# Patient Record
Sex: FEMALE | Race: OTHER | NOT HISPANIC OR LATINO | Employment: UNEMPLOYED | URBAN - METROPOLITAN AREA
[De-identification: names, ages, dates, MRNs, and addresses within clinical notes are randomized per-mention and may not be internally consistent; named-entity substitution may affect disease eponyms.]

---

## 2021-03-11 ENCOUNTER — NURSE TRIAGE (OUTPATIENT)
Dept: OTHER | Facility: OTHER | Age: 36
End: 2021-03-11

## 2021-03-11 DIAGNOSIS — Z20.828 SARS-ASSOCIATED CORONAVIRUS EXPOSURE: ICD-10-CM

## 2021-03-11 DIAGNOSIS — Z20.828 SARS-ASSOCIATED CORONAVIRUS EXPOSURE: Primary | ICD-10-CM

## 2021-03-11 PROCEDURE — U0003 INFECTIOUS AGENT DETECTION BY NUCLEIC ACID (DNA OR RNA); SEVERE ACUTE RESPIRATORY SYNDROME CORONAVIRUS 2 (SARS-COV-2) (CORONAVIRUS DISEASE [COVID-19]), AMPLIFIED PROBE TECHNIQUE, MAKING USE OF HIGH THROUGHPUT TECHNOLOGIES AS DESCRIBED BY CMS-2020-01-R: HCPCS | Performed by: FAMILY MEDICINE

## 2021-03-11 PROCEDURE — U0005 INFEC AGEN DETEC AMPLI PROBE: HCPCS | Performed by: FAMILY MEDICINE

## 2021-03-11 NOTE — TELEPHONE ENCOUNTER
Reason for Disposition   [1] COVID-19 infection suspected by caller or triager AND [2] mild symptoms (cough, fever, or others) AND [0] no complications or SOB    Answer Assessment - Initial Assessment Questions  Were you within 6 feet or less, for up to 15 minutes or more with a person that has a confirmed COVID-19 test?       yes      What was the date of your exposure? 3/3/21      Are you experiencing any symptoms attributed to the virus?  (Assess for SOB, cough, fever, difficulty breathing)        Yes  Cough, sneezing, headache and sore throat      HIGH RISK: Do you have any history heart or lung conditions, weakened immune system, diabetes, Asthma, CHF, HIV, COPD, Chemo, renal failure, sickle cell, etc?        Asthma, Hypertension      PREGNANCY: Are you pregnant or did you recently give birth?         Denies    Protocols used: CORONAVIRUS (COVID-19) DIAGNOSED OR SUSPECTED-ADULT-OH

## 2021-03-11 NOTE — TELEPHONE ENCOUNTER
Regarding: Covis/symptomatic/ cough  ----- Message from Robert Rosas sent at 3/11/2021  3:45 PM EST -----  "Im having covid symptoms, I have a headache, cough, and sore throat "

## 2021-03-12 ENCOUNTER — TELEPHONE (OUTPATIENT)
Dept: OTHER | Facility: OTHER | Age: 36
End: 2021-03-12

## 2021-03-12 LAB — SARS-COV-2 RNA RESP QL NAA+PROBE: NEGATIVE

## 2021-03-12 NOTE — TELEPHONE ENCOUNTER
Your test for COVID-19, also known as novel coronavirus, came back negative  You do not have COVID-19  If you have any additional questions, we can schedule a virtual visit for you with a provider or [phone] Option 7 for care advice

## 2021-10-26 ENCOUNTER — OFFICE VISIT (OUTPATIENT)
Dept: FAMILY MEDICINE CLINIC | Facility: HOME HEALTHCARE | Age: 36
End: 2021-10-26
Payer: COMMERCIAL

## 2021-10-26 VITALS
WEIGHT: 185.4 LBS | BODY MASS INDEX: 32.85 KG/M2 | OXYGEN SATURATION: 97 % | HEART RATE: 87 BPM | HEIGHT: 63 IN | SYSTOLIC BLOOD PRESSURE: 142 MMHG | DIASTOLIC BLOOD PRESSURE: 78 MMHG | RESPIRATION RATE: 16 BRPM | TEMPERATURE: 97.5 F

## 2021-10-26 DIAGNOSIS — Z13.21 SCREENING FOR ENDOCRINE, NUTRITIONAL, METABOLIC AND IMMUNITY DISORDER: ICD-10-CM

## 2021-10-26 DIAGNOSIS — M25.50 POLYARTHRALGIA: ICD-10-CM

## 2021-10-26 DIAGNOSIS — F17.210 CIGARETTE SMOKER: ICD-10-CM

## 2021-10-26 DIAGNOSIS — Z13.29 SCREENING FOR ENDOCRINE, NUTRITIONAL, METABOLIC AND IMMUNITY DISORDER: ICD-10-CM

## 2021-10-26 DIAGNOSIS — Z13.0 SCREENING FOR ENDOCRINE, NUTRITIONAL, METABOLIC AND IMMUNITY DISORDER: ICD-10-CM

## 2021-10-26 DIAGNOSIS — Z23 ENCOUNTER FOR IMMUNIZATION: ICD-10-CM

## 2021-10-26 DIAGNOSIS — Z00.00 ANNUAL PHYSICAL EXAM: Primary | ICD-10-CM

## 2021-10-26 DIAGNOSIS — Z11.4 SCREENING FOR HIV (HUMAN IMMUNODEFICIENCY VIRUS): ICD-10-CM

## 2021-10-26 DIAGNOSIS — Z11.59 NEED FOR HEPATITIS C SCREENING TEST: ICD-10-CM

## 2021-10-26 DIAGNOSIS — Z12.4 SCREENING FOR CERVICAL CANCER: ICD-10-CM

## 2021-10-26 DIAGNOSIS — Z13.228 SCREENING FOR ENDOCRINE, NUTRITIONAL, METABOLIC AND IMMUNITY DISORDER: ICD-10-CM

## 2021-10-26 DIAGNOSIS — F17.200 TOBACCO DEPENDENCE: ICD-10-CM

## 2021-10-26 DIAGNOSIS — J45.50 SEVERE PERSISTENT ASTHMA, UNSPECIFIED WHETHER COMPLICATED: ICD-10-CM

## 2021-10-26 DIAGNOSIS — J45.50 SEVERE PERSISTENT ASTHMA, UNSPECIFIED WHETHER COMPLICATED: Primary | ICD-10-CM

## 2021-10-26 DIAGNOSIS — J30.2 SEASONAL ALLERGIES: ICD-10-CM

## 2021-10-26 DIAGNOSIS — I10 HYPERTENSION, UNSPECIFIED TYPE: ICD-10-CM

## 2021-10-26 PROCEDURE — 90732 PPSV23 VACC 2 YRS+ SUBQ/IM: CPT

## 2021-10-26 PROCEDURE — T1015 CLINIC SERVICE: HCPCS | Performed by: FAMILY MEDICINE

## 2021-10-26 PROCEDURE — 99385 PREV VISIT NEW AGE 18-39: CPT | Performed by: FAMILY MEDICINE

## 2021-10-26 RX ORDER — BUDESONIDE AND FORMOTEROL FUMARATE DIHYDRATE 160; 4.5 UG/1; UG/1
2 AEROSOL RESPIRATORY (INHALATION) 2 TIMES DAILY
Qty: 10.2 G | Refills: 5 | Status: SHIPPED | OUTPATIENT
Start: 2021-10-26 | End: 2022-06-13

## 2021-10-26 RX ORDER — ALBUTEROL SULFATE 90 UG/1
2 AEROSOL, METERED RESPIRATORY (INHALATION) EVERY 6 HOURS PRN
Qty: 18 G | Refills: 5 | Status: SHIPPED | OUTPATIENT
Start: 2021-10-26 | End: 2022-05-13

## 2021-10-26 RX ORDER — ALBUTEROL SULFATE 90 UG/1
AEROSOL, METERED RESPIRATORY (INHALATION)
COMMUNITY
Start: 2021-10-14 | End: 2021-10-26 | Stop reason: SDUPTHER

## 2021-10-26 RX ORDER — LISINOPRIL AND HYDROCHLOROTHIAZIDE 12.5; 1 MG/1; MG/1
1 TABLET ORAL DAILY
Qty: 30 TABLET | Refills: 5 | Status: SHIPPED | OUTPATIENT
Start: 2021-10-26 | End: 2022-04-18

## 2021-10-26 RX ORDER — FEXOFENADINE HCL 180 MG/1
180 TABLET ORAL DAILY
Qty: 30 TABLET | Refills: 5 | Status: SHIPPED | OUTPATIENT
Start: 2021-10-26

## 2021-10-26 RX ORDER — MONTELUKAST SODIUM 10 MG/1
10 TABLET ORAL
Qty: 30 TABLET | Refills: 5 | Status: SHIPPED | OUTPATIENT
Start: 2021-10-26 | End: 2022-04-18

## 2021-10-27 ENCOUNTER — LAB (OUTPATIENT)
Dept: LAB | Facility: HOSPITAL | Age: 36
End: 2021-10-27
Payer: COMMERCIAL

## 2021-10-27 ENCOUNTER — HOSPITAL ENCOUNTER (OUTPATIENT)
Dept: NON INVASIVE DIAGNOSTICS | Facility: HOSPITAL | Age: 36
Discharge: HOME | End: 2021-10-27
Payer: COMMERCIAL

## 2021-10-27 DIAGNOSIS — Z11.59 NEED FOR HEPATITIS C SCREENING TEST: ICD-10-CM

## 2021-10-27 DIAGNOSIS — Z13.228 SCREENING FOR ENDOCRINE, NUTRITIONAL, METABOLIC AND IMMUNITY DISORDER: ICD-10-CM

## 2021-10-27 DIAGNOSIS — Z13.29 SCREENING FOR ENDOCRINE, NUTRITIONAL, METABOLIC AND IMMUNITY DISORDER: ICD-10-CM

## 2021-10-27 DIAGNOSIS — I10 HYPERTENSION, UNSPECIFIED TYPE: ICD-10-CM

## 2021-10-27 DIAGNOSIS — Z13.0 SCREENING FOR ENDOCRINE, NUTRITIONAL, METABOLIC AND IMMUNITY DISORDER: ICD-10-CM

## 2021-10-27 DIAGNOSIS — Z13.21 SCREENING FOR ENDOCRINE, NUTRITIONAL, METABOLIC AND IMMUNITY DISORDER: ICD-10-CM

## 2021-10-27 DIAGNOSIS — M25.50 POLYARTHRALGIA: ICD-10-CM

## 2021-10-27 DIAGNOSIS — Z11.4 SCREENING FOR HIV (HUMAN IMMUNODEFICIENCY VIRUS): ICD-10-CM

## 2021-10-27 LAB
ALBUMIN SERPL BCP-MCNC: 3.7 G/DL (ref 3.5–5)
ALP SERPL-CCNC: 93 U/L (ref 46–116)
ALT SERPL W P-5'-P-CCNC: 33 U/L (ref 12–78)
ANION GAP SERPL CALCULATED.3IONS-SCNC: 7 MMOL/L (ref 4–13)
AST SERPL W P-5'-P-CCNC: 19 U/L (ref 5–45)
ATRIAL RATE: 96 BPM
BILIRUB SERPL-MCNC: 0.54 MG/DL (ref 0.2–1)
BUN SERPL-MCNC: 11 MG/DL (ref 5–25)
CALCIUM SERPL-MCNC: 9 MG/DL (ref 8.3–10.1)
CHLORIDE SERPL-SCNC: 103 MMOL/L (ref 100–108)
CHOLEST SERPL-MCNC: 159 MG/DL (ref 50–200)
CO2 SERPL-SCNC: 28 MMOL/L (ref 21–32)
CREAT SERPL-MCNC: 0.74 MG/DL (ref 0.6–1.3)
ERYTHROCYTE [DISTWIDTH] IN BLOOD BY AUTOMATED COUNT: 12.1 % (ref 11.6–15.1)
GFR SERPL CREATININE-BSD FRML MDRD: 103 ML/MIN/1.73SQ M
GLUCOSE P FAST SERPL-MCNC: 105 MG/DL (ref 65–99)
HCT VFR BLD AUTO: 43.5 % (ref 34.8–46.1)
HCV AB SER QL: NORMAL
HDLC SERPL-MCNC: 34 MG/DL
HGB BLD-MCNC: 14.2 G/DL (ref 11.5–15.4)
LDLC SERPL CALC-MCNC: 88 MG/DL (ref 0–100)
MCH RBC QN AUTO: 30.3 PG (ref 26.8–34.3)
MCHC RBC AUTO-ENTMCNC: 32.6 G/DL (ref 31.4–37.4)
MCV RBC AUTO: 93 FL (ref 82–98)
NONHDLC SERPL-MCNC: 125 MG/DL
P AXIS: 76 DEGREES
PLATELET # BLD AUTO: 313 THOUSANDS/UL (ref 149–390)
PMV BLD AUTO: 9.7 FL (ref 8.9–12.7)
POTASSIUM SERPL-SCNC: 3.8 MMOL/L (ref 3.5–5.3)
PR INTERVAL: 142 MS
PROT SERPL-MCNC: 7.4 G/DL (ref 6.4–8.2)
QRS AXIS: 78 DEGREES
QRSD INTERVAL: 80 MS
QT INTERVAL: 354 MS
QTC INTERVAL: 447 MS
RBC # BLD AUTO: 4.69 MILLION/UL (ref 3.81–5.12)
SODIUM SERPL-SCNC: 138 MMOL/L (ref 136–145)
T WAVE AXIS: 75 DEGREES
TRIGL SERPL-MCNC: 185 MG/DL
TSH SERPL DL<=0.05 MIU/L-ACNC: 0.95 UIU/ML (ref 0.36–3.74)
VENTRICULAR RATE: 96 BPM
WBC # BLD AUTO: 8.18 THOUSAND/UL (ref 4.31–10.16)

## 2021-10-27 PROCEDURE — 86803 HEPATITIS C AB TEST: CPT

## 2021-10-27 PROCEDURE — 84443 ASSAY THYROID STIM HORMONE: CPT

## 2021-10-27 PROCEDURE — 36415 COLL VENOUS BLD VENIPUNCTURE: CPT

## 2021-10-27 PROCEDURE — 80053 COMPREHEN METABOLIC PANEL: CPT

## 2021-10-27 PROCEDURE — 86038 ANTINUCLEAR ANTIBODIES: CPT

## 2021-10-27 PROCEDURE — 93005 ELECTROCARDIOGRAM TRACING: CPT

## 2021-10-27 PROCEDURE — 87389 HIV-1 AG W/HIV-1&-2 AB AG IA: CPT

## 2021-10-27 PROCEDURE — 85027 COMPLETE CBC AUTOMATED: CPT

## 2021-10-27 PROCEDURE — 93010 ELECTROCARDIOGRAM REPORT: CPT | Performed by: INTERNAL MEDICINE

## 2021-10-27 PROCEDURE — 86430 RHEUMATOID FACTOR TEST QUAL: CPT

## 2021-10-27 PROCEDURE — 80061 LIPID PANEL: CPT

## 2021-10-28 LAB — RHEUMATOID FACT SER QL LA: NEGATIVE

## 2021-10-29 LAB
HIV 1+2 AB+HIV1 P24 AG SERPL QL IA: NORMAL
RYE IGE QN: NEGATIVE

## 2021-11-02 ENCOUNTER — OFFICE VISIT (OUTPATIENT)
Dept: OBGYN CLINIC | Facility: CLINIC | Age: 36
End: 2021-11-02
Payer: COMMERCIAL

## 2021-11-02 VITALS
WEIGHT: 182.6 LBS | BODY MASS INDEX: 32.36 KG/M2 | HEIGHT: 63 IN | SYSTOLIC BLOOD PRESSURE: 124 MMHG | DIASTOLIC BLOOD PRESSURE: 78 MMHG

## 2021-11-02 DIAGNOSIS — N89.8 VAGINAL DISCHARGE: ICD-10-CM

## 2021-11-02 DIAGNOSIS — Z12.4 SCREENING FOR CERVICAL CANCER: ICD-10-CM

## 2021-11-02 DIAGNOSIS — Z12.4 SCREENING FOR MALIGNANT NEOPLASM OF CERVIX: ICD-10-CM

## 2021-11-02 DIAGNOSIS — Z01.419 ROUTINE GYNECOLOGICAL EXAMINATION: Primary | ICD-10-CM

## 2021-11-02 PROCEDURE — G0476 HPV COMBO ASSAY CA SCREEN: HCPCS | Performed by: ADVANCED PRACTICE MIDWIFE

## 2021-11-02 PROCEDURE — G0145 SCR C/V CYTO,THINLAYER,RESCR: HCPCS | Performed by: ADVANCED PRACTICE MIDWIFE

## 2021-11-02 PROCEDURE — 0503F POSTPARTUM CARE VISIT: CPT | Performed by: ADVANCED PRACTICE MIDWIFE

## 2021-11-02 PROCEDURE — 99385 PREV VISIT NEW AGE 18-39: CPT | Performed by: ADVANCED PRACTICE MIDWIFE

## 2021-11-02 PROCEDURE — 87591 N.GONORRHOEAE DNA AMP PROB: CPT | Performed by: ADVANCED PRACTICE MIDWIFE

## 2021-11-02 PROCEDURE — 87491 CHLMYD TRACH DNA AMP PROBE: CPT | Performed by: ADVANCED PRACTICE MIDWIFE

## 2021-11-03 LAB
C TRACH DNA SPEC QL NAA+PROBE: NEGATIVE
N GONORRHOEA DNA SPEC QL NAA+PROBE: NEGATIVE

## 2021-11-05 LAB
HPV HR 12 DNA CVX QL NAA+PROBE: NEGATIVE
HPV16 DNA CVX QL NAA+PROBE: NEGATIVE
HPV18 DNA CVX QL NAA+PROBE: NEGATIVE

## 2021-11-08 ENCOUNTER — TELEPHONE (OUTPATIENT)
Dept: OBGYN CLINIC | Facility: MEDICAL CENTER | Age: 36
End: 2021-11-08

## 2021-11-08 LAB
LAB AP GYN PRIMARY INTERPRETATION: NORMAL
Lab: NORMAL
PATH INTERP SPEC-IMP: NORMAL

## 2021-11-09 ENCOUNTER — OFFICE VISIT (OUTPATIENT)
Dept: FAMILY MEDICINE CLINIC | Facility: HOME HEALTHCARE | Age: 36
End: 2021-11-09
Payer: COMMERCIAL

## 2021-11-09 ENCOUNTER — TELEPHONE (OUTPATIENT)
Dept: OBGYN CLINIC | Facility: MEDICAL CENTER | Age: 36
End: 2021-11-09

## 2021-11-09 VITALS
DIASTOLIC BLOOD PRESSURE: 60 MMHG | SYSTOLIC BLOOD PRESSURE: 104 MMHG | RESPIRATION RATE: 16 BRPM | WEIGHT: 184.4 LBS | TEMPERATURE: 97.5 F | HEIGHT: 63 IN | HEART RATE: 88 BPM | BODY MASS INDEX: 32.67 KG/M2 | OXYGEN SATURATION: 99 %

## 2021-11-09 DIAGNOSIS — I10 PRIMARY HYPERTENSION: ICD-10-CM

## 2021-11-09 DIAGNOSIS — R73.01 ELEVATED FASTING GLUCOSE: ICD-10-CM

## 2021-11-09 DIAGNOSIS — M25.50 POLYARTHRALGIA: Primary | ICD-10-CM

## 2021-11-09 DIAGNOSIS — J45.40 MODERATE PERSISTENT ASTHMA WITHOUT COMPLICATION: ICD-10-CM

## 2021-11-09 DIAGNOSIS — F17.210 CIGARETTE SMOKER: ICD-10-CM

## 2021-11-09 PROCEDURE — T1015 CLINIC SERVICE: HCPCS | Performed by: FAMILY MEDICINE

## 2021-11-09 PROCEDURE — 99213 OFFICE O/P EST LOW 20 MIN: CPT | Performed by: FAMILY MEDICINE

## 2021-11-10 ENCOUNTER — APPOINTMENT (OUTPATIENT)
Dept: LAB | Facility: HOSPITAL | Age: 36
End: 2021-11-10
Payer: COMMERCIAL

## 2021-11-10 DIAGNOSIS — R73.01 ELEVATED FASTING GLUCOSE: ICD-10-CM

## 2021-11-10 LAB
EST. AVERAGE GLUCOSE BLD GHB EST-MCNC: 117 MG/DL
HBA1C MFR BLD: 5.7 %

## 2021-11-10 PROCEDURE — 83036 HEMOGLOBIN GLYCOSYLATED A1C: CPT

## 2021-11-10 PROCEDURE — 36415 COLL VENOUS BLD VENIPUNCTURE: CPT

## 2021-11-18 ENCOUNTER — OFFICE VISIT (OUTPATIENT)
Dept: RHEUMATOLOGY | Facility: CLINIC | Age: 36
End: 2021-11-18
Payer: COMMERCIAL

## 2021-11-18 VITALS
WEIGHT: 184 LBS | HEIGHT: 63 IN | DIASTOLIC BLOOD PRESSURE: 76 MMHG | BODY MASS INDEX: 32.6 KG/M2 | SYSTOLIC BLOOD PRESSURE: 122 MMHG

## 2021-11-18 DIAGNOSIS — M25.50 POLYARTHRALGIA: Primary | ICD-10-CM

## 2021-11-18 DIAGNOSIS — G89.29 CHRONIC BILATERAL LOW BACK PAIN, UNSPECIFIED WHETHER SCIATICA PRESENT: ICD-10-CM

## 2021-11-18 DIAGNOSIS — M54.50 CHRONIC BILATERAL LOW BACK PAIN, UNSPECIFIED WHETHER SCIATICA PRESENT: ICD-10-CM

## 2021-11-18 PROCEDURE — 99244 OFF/OP CNSLTJ NEW/EST MOD 40: CPT | Performed by: INTERNAL MEDICINE

## 2021-11-19 ENCOUNTER — HOSPITAL ENCOUNTER (OUTPATIENT)
Dept: RADIOLOGY | Facility: HOSPITAL | Age: 36
Discharge: HOME | End: 2021-11-19
Payer: COMMERCIAL

## 2021-11-19 ENCOUNTER — APPOINTMENT (OUTPATIENT)
Dept: LAB | Facility: HOSPITAL | Age: 36
End: 2021-11-19
Payer: COMMERCIAL

## 2021-11-19 DIAGNOSIS — M54.50 CHRONIC BILATERAL LOW BACK PAIN, UNSPECIFIED WHETHER SCIATICA PRESENT: ICD-10-CM

## 2021-11-19 DIAGNOSIS — G89.29 CHRONIC BILATERAL LOW BACK PAIN, UNSPECIFIED WHETHER SCIATICA PRESENT: ICD-10-CM

## 2021-11-19 LAB
25(OH)D3 SERPL-MCNC: 32.2 NG/ML (ref 30–100)
CRP SERPL QL: 4.6 MG/L
ERYTHROCYTE [SEDIMENTATION RATE] IN BLOOD: 15 MM/HOUR (ref 0–19)

## 2021-11-19 PROCEDURE — 72110 X-RAY EXAM L-2 SPINE 4/>VWS: CPT

## 2021-11-19 PROCEDURE — 82306 VITAMIN D 25 HYDROXY: CPT | Performed by: INTERNAL MEDICINE

## 2021-11-19 PROCEDURE — 86140 C-REACTIVE PROTEIN: CPT | Performed by: INTERNAL MEDICINE

## 2021-11-19 PROCEDURE — 36415 COLL VENOUS BLD VENIPUNCTURE: CPT | Performed by: INTERNAL MEDICINE

## 2021-11-19 PROCEDURE — 86235 NUCLEAR ANTIGEN ANTIBODY: CPT | Performed by: INTERNAL MEDICINE

## 2021-11-19 PROCEDURE — 85652 RBC SED RATE AUTOMATED: CPT | Performed by: INTERNAL MEDICINE

## 2021-11-19 PROCEDURE — 72202 X-RAY EXAM SI JOINTS 3/> VWS: CPT

## 2021-11-19 PROCEDURE — 86200 CCP ANTIBODY: CPT | Performed by: INTERNAL MEDICINE

## 2021-11-20 LAB
ENA SS-A AB SER-ACNC: <0.2 AI (ref 0–0.9)
ENA SS-B AB SER-ACNC: <0.2 AI (ref 0–0.9)

## 2021-11-23 LAB — CCP AB SER IA-ACNC: 2

## 2021-11-29 DIAGNOSIS — M54.50 CHRONIC BILATERAL LOW BACK PAIN, UNSPECIFIED WHETHER SCIATICA PRESENT: Primary | ICD-10-CM

## 2021-11-29 DIAGNOSIS — G89.29 CHRONIC BILATERAL LOW BACK PAIN, UNSPECIFIED WHETHER SCIATICA PRESENT: Primary | ICD-10-CM

## 2021-11-29 DIAGNOSIS — M51.36 LUMBAR DEGENERATIVE DISC DISEASE: ICD-10-CM

## 2021-12-14 ENCOUNTER — HOSPITAL ENCOUNTER (OUTPATIENT)
Dept: PULMONOLOGY | Facility: HOSPITAL | Age: 36
Discharge: HOME | End: 2021-12-14
Payer: COMMERCIAL

## 2021-12-14 DIAGNOSIS — J45.40 MODERATE PERSISTENT ASTHMA WITHOUT COMPLICATION: ICD-10-CM

## 2021-12-14 DIAGNOSIS — F17.210 CIGARETTE SMOKER: ICD-10-CM

## 2021-12-14 PROCEDURE — 94729 DIFFUSING CAPACITY: CPT | Performed by: INTERNAL MEDICINE

## 2021-12-14 PROCEDURE — 94060 EVALUATION OF WHEEZING: CPT | Performed by: INTERNAL MEDICINE

## 2021-12-14 PROCEDURE — 94726 PLETHYSMOGRAPHY LUNG VOLUMES: CPT | Performed by: INTERNAL MEDICINE

## 2021-12-14 RX ORDER — ALBUTEROL SULFATE 2.5 MG/3ML
2.5 SOLUTION RESPIRATORY (INHALATION) ONCE
Status: COMPLETED | OUTPATIENT
Start: 2021-12-14 | End: 2021-12-14

## 2021-12-14 RX ADMIN — ALBUTEROL SULFATE 2.5 MG: 2.5 SOLUTION RESPIRATORY (INHALATION) at 13:15

## 2021-12-29 DIAGNOSIS — R51.9 NONINTRACTABLE HEADACHE, UNSPECIFIED CHRONICITY PATTERN, UNSPECIFIED HEADACHE TYPE: ICD-10-CM

## 2021-12-29 DIAGNOSIS — R05.9 COUGH: Primary | ICD-10-CM

## 2021-12-29 DIAGNOSIS — R06.7 SNEEZING: ICD-10-CM

## 2021-12-29 PROCEDURE — 0241U HB NFCT DS VIR RESP RNA 4 TRGT: CPT | Performed by: PHYSICIAN ASSISTANT

## 2022-01-03 ENCOUNTER — OFFICE VISIT (OUTPATIENT)
Dept: PAIN MEDICINE | Facility: CLINIC | Age: 37
End: 2022-01-03
Payer: COMMERCIAL

## 2022-01-03 ENCOUNTER — EVALUATION (OUTPATIENT)
Dept: PHYSICAL THERAPY | Facility: HOME HEALTHCARE | Age: 37
End: 2022-01-03
Payer: COMMERCIAL

## 2022-01-03 ENCOUNTER — APPOINTMENT (OUTPATIENT)
Dept: RADIOLOGY | Facility: CLINIC | Age: 37
End: 2022-01-03
Payer: COMMERCIAL

## 2022-01-03 VITALS
HEIGHT: 63 IN | DIASTOLIC BLOOD PRESSURE: 82 MMHG | HEART RATE: 96 BPM | WEIGHT: 187 LBS | SYSTOLIC BLOOD PRESSURE: 136 MMHG | BODY MASS INDEX: 33.13 KG/M2

## 2022-01-03 DIAGNOSIS — G89.29 CHRONIC BILATERAL LOW BACK PAIN, UNSPECIFIED WHETHER SCIATICA PRESENT: ICD-10-CM

## 2022-01-03 DIAGNOSIS — G89.29 CHRONIC CERVICAL PAIN: ICD-10-CM

## 2022-01-03 DIAGNOSIS — M54.2 NECK PAIN: ICD-10-CM

## 2022-01-03 DIAGNOSIS — M54.2 NECK PAIN: Primary | ICD-10-CM

## 2022-01-03 DIAGNOSIS — M54.12 CERVICAL RADICULOPATHY: ICD-10-CM

## 2022-01-03 DIAGNOSIS — M54.50 CHRONIC BILATERAL LOW BACK PAIN, UNSPECIFIED WHETHER SCIATICA PRESENT: ICD-10-CM

## 2022-01-03 DIAGNOSIS — M54.2 CHRONIC CERVICAL PAIN: ICD-10-CM

## 2022-01-03 DIAGNOSIS — M51.36 LUMBAR DEGENERATIVE DISC DISEASE: ICD-10-CM

## 2022-01-03 DIAGNOSIS — M54.42 CHRONIC BILATERAL LOW BACK PAIN WITH BILATERAL SCIATICA: Primary | ICD-10-CM

## 2022-01-03 DIAGNOSIS — G89.29 CHRONIC BILATERAL LOW BACK PAIN WITH BILATERAL SCIATICA: Primary | ICD-10-CM

## 2022-01-03 DIAGNOSIS — M54.41 CHRONIC BILATERAL LOW BACK PAIN WITH BILATERAL SCIATICA: Primary | ICD-10-CM

## 2022-01-03 PROCEDURE — 72050 X-RAY EXAM NECK SPINE 4/5VWS: CPT

## 2022-01-03 PROCEDURE — 99244 OFF/OP CNSLTJ NEW/EST MOD 40: CPT | Performed by: ANESTHESIOLOGY

## 2022-01-03 PROCEDURE — 97163 PT EVAL HIGH COMPLEX 45 MIN: CPT | Performed by: PHYSICAL THERAPIST

## 2022-01-03 PROCEDURE — 97112 NEUROMUSCULAR REEDUCATION: CPT | Performed by: PHYSICAL THERAPIST

## 2022-01-03 PROCEDURE — 97110 THERAPEUTIC EXERCISES: CPT | Performed by: PHYSICAL THERAPIST

## 2022-01-03 RX ORDER — DULOXETIN HYDROCHLORIDE 20 MG/1
20 CAPSULE, DELAYED RELEASE ORAL DAILY
Qty: 30 CAPSULE | Refills: 1 | Status: SHIPPED | OUTPATIENT
Start: 2022-01-03 | End: 2022-03-01

## 2022-01-03 NOTE — PATIENT INSTRUCTIONS
Duloxetine (By mouth)   Duloxetine (doo-LOX-e-teen)  Treats depression, anxiety, diabetic peripheral neuropathy, fibromyalgia, and chronic muscle or bone pain  This medicine is an SSNRI  Brand Name(s): Cymbalta, izalma Sprinkle, Ireabigailka   There may be other brand names for this medicine  When This Medicine Should Not Be Used: This medicine is not right for everyone  Do not use it if you had an allergic reaction to duloxetine  How to Use This Medicine:   Capsule, Delayed Release Capsule  · Take your medicine as directed  Your dose may need to be changed several times to find what works best for you  · Delayed-release capsule: Swallow the capsule whole  Do not crush, chew, break, or open it  Do not open the Cymbalta® delayed-release capsule and sprinkle the contents on food or in liquids  · If you have trouble swallowing the Carlos Gandhi delayed release capsule:   ? You may open the capsule and sprinkle the contents over one tablespoon (15 mL) of applesauce  Swallow the mixture right away and do not save any of the mixture to use later  ? You may open the capsule and pour the contents to an all plastic catheter tip syringe and add 50 mL of water  Do not use other liquids  Gently shake it for 10 seconds, and then use it through a nasogastric tube  Rinse with additional water (about 15 mL) if needed  · This medicine should come with a Medication Guide  Ask your pharmacist for a copy if you do not have one  · Missed dose: Take a dose as soon as you remember  If it is almost time for your next dose, wait until then and take a regular dose  Do not take extra medicine to make up for a missed dose  · Store the medicine in a closed container at room temperature, away from heat, moisture, and direct light  Drugs and Foods to Avoid:   Ask your doctor or pharmacist before using any other medicine, including over-the-counter medicines, vitamins, and herbal products    · Do not take duloxetine if you have used an MAO inhibitor (MAOI) within the past 14 days  Do not start taking an MAO inhibitor within 5 days of stopping duloxetine  Ask your doctor if you are not sure if you take an MAOI, including linezolid or methylene blue injection  · Some medicines can affect how duloxetine works  Tell your doctor if you are using any of the following:  ? Buspirone, cimetidine, ciprofloxacin, enoxacin, fentanyl, fluvoxamine, lithium, Joshua's wort, theophylline, tramadol, tryptophan, warfarin  ? Amphetamines  ? Blood pressure medicine  ? Diuretic (water pill)  ? Medicine for heart rhythm problems (including flecainide, propafenone, quinidine)  ? Medicine to treat migraine headaches (including triptans)  ? NSAID pain or arthritis medicine (including aspirin, celecoxib, diclofenac, ibuprofen, naproxen)  ? Other medicine to treat depression or mood disorders (including amitriptyline, desipramine, fluoxetine, imipramine, nortriptyline, paroxetine)  ? Phenothiazine medicine (including thioridazine)  ? Stomach medicine (including famotidine, antacids containing aluminum or magnesium, PPIs)  · Tell your doctor if you use anything else that makes you sleepy  Some examples are allergy medicine, narcotic pain medicine, and alcohol  · Do not drink alcohol while you are using this medicine  Warnings While Using This Medicine:   · Tell your doctor if you are pregnant or breastfeeding, or if you have kidney disease, liver disease, bleeding problems, diabetes, digestion problems, glaucoma, heart disease, high or low blood pressure, or problems with urination  Tell your doctor if you smoke or you have a history of seizures, mental health problems (including bipolar disorder, dara), or drug or alcohol addiction  · This medicine may cause the following problems:   ? Serious liver problems  ? Serotonin syndrome, when used with certain medicines  ? Increased risk of bleeding problems  ?  Serious skin reactions, including erythema multiforme, Caceres-Kalin syndrome  ? Low sodium levels in the blood  ? Sexual problems  · This medicine can increase thoughts of suicide  Tell your doctor right away if you start to feel depressed and have thoughts about hurting yourself  · This medicine may cause blurred vision, dizziness, drowsiness, trouble with thinking, or trouble with controlling body movements, which may lead to falls, fractures, or other injuries  Do not drive or do anything that could be dangerous until you know how this medicine affects you  Stand up slowly to avoid falls  · Do not stop using this medicine suddenly  Your doctor will need to slowly decrease your dose before you stop it completely  · Your doctor will check your progress and the effects of this medicine at regular visits  Keep all appointments  · Keep all medicine out of the reach of children  Never share your medicine with anyone    Possible Side Effects While Using This Medicine:   Call your doctor right away if you notice any of these side effects:  · Allergic reaction: Itching or hives, swelling in your face or hands, swelling or tingling in your mouth or throat, chest tightness, trouble breathing  · Anxiety, restlessness, fever, fast heartbeat, sweating, muscle spasms, diarrhea, seeing or hearing things that are not there  · Blistering, peeling, red skin rash  · Confusion, weakness, muscle twitching  · Dark urine or pale stools, nausea, vomiting, loss of appetite, stomach pain, yellow skin or eyes  · Decrease in how much or how often you urinate  · Decrease in sexual ability, desire, drive, or performance  · Eye pain, vision changes, seeing halos around lights  · Feeling more energetic than usual  · Lightheadedness, dizziness, fainting  · Unusual moods or behaviors, worsening depression, thoughts about hurting yourself, trouble sleeping  · Unusual bleeding or bruising  If you notice these less serious side effects, talk with your doctor:   · Decrease in appetite or weight  · Dry mouth, constipation, mild nausea  · Headache  · Unusual drowsiness, sleepiness, or tiredness  If you notice other side effects that you think are caused by this medicine, tell your doctor  Call your doctor for medical advice about side effects  You may report side effects to FDA at 1-576-FDA-3102    © Copyright [employer] 2021 Information is for End User's use only and may not be sold, redistributed or otherwise used for commercial purposes  The above information is an  only  It is not intended as medical advice for individual conditions or treatments  Talk to your doctor, nurse or pharmacist before following any medical regimen to see if it is safe and effective for you

## 2022-01-03 NOTE — LETTER
January 3, 2022     DO Ceislia Bailey [address]  [address]    Patient: Gela Mendoza   YOB: 1985   Date of Visit: 1/3/2022       Dear Dr Ricardo Skinner: Thank you for referring Nichole Crook to me for evaluation  Below are my notes for this consultation  If you have questions, please do not hesitate to call me  I look forward to following your patient along with you  Sincerely,        Maame Polanco MD        CC: No Recipients  Maame Polanco MD  1/3/2022 10:45 AM  Signed  Assessment:  1  Neck pain    2  Chronic bilateral low back pain, unspecified whether sciatica present    3  Lumbar degenerative disc disease    4  Cervical radiculopathy        Plan:  Patient is a 25-year-old female with complaints of neck pain, bilateral arm pain, low back pain, bilateral leg pain presents office for initial consultation  Patient is currently being managed by Rheumatology for polyarthritis  X-ray lumbar spine does show degenerative disc changes at L5-S1   1  We will order x-ray of the cervical spine  2  We will order physical therapy for cervical and lumbar spine strengthening exercises  3  We will trial Cymbalta for neuropathic pain  4  Follow-up in 6 weeks after patient's complete physical therapy after which we will then order an MRI of the cervical lumbar spine    History of Present Illness: The patient is a 43 y o  female who presents for consultation in regards to Shoulder Pain, Back Pain, Arm Pain, Knee Pain, and Ankle Pain  Symptoms have been present for 8 years  Symptoms began without any precipitating injury or trauma  Pain is reported to be 10 on the numeric rating scale  Symptoms are felt constantly and worst in the morning, evening  Symptoms are characterized as shooting, sharp, cutting, numbing, pressure-like and throbbing  Symptoms are associated with bilateral arm weakness and bilateral leg weakness    Aggravating factors include lying down, standing, bending, leaning forward, leaning bckward, sitting, walking, exercise, turning the head, relaxation and coughing/sneezing  Relieving factors include nothing  No change in symptoms with bowel movements  Patient has not tried any pain relieving modalities at this time  Medications to relieve symptoms include ibuprofen, Tylenol  Review of Systems:    Review of Systems   All other systems reviewed and are negative  Past Medical History:   Diagnosis Date    Asthma     Hypertension        Past Surgical History:   Procedure Laterality Date     SECTION      TUBAL LIGATION         No family history on file      Social History     Occupational History    Not on file   Tobacco Use    Smoking status: Current Every Day Smoker     Packs/day:  00     Types: Cigarettes    Smokeless tobacco: Never Used   Vaping Use    Vaping Use: Never used   Substance and Sexual Activity    Alcohol use: Not Currently    Drug use: Never    Sexual activity: Yes     Partners: Male     Birth control/protection: Female Sterilization         Current Outpatient Medications:     albuterol (PROVENTIL HFA,VENTOLIN HFA) 90 mcg/act inhaler, Inhale 2 puffs every 6 (six) hours as needed for wheezing, Disp: 18 g, Rfl: 5    budesonide-formoterol (Symbicort) 160-4 5 mcg/act inhaler, Inhale 2 puffs 2 (two) times a day Rinse mouth after use , Disp: 10 2 g, Rfl: 5    fexofenadine (ALLEGRA) 180 MG tablet, Take 1 tablet (180 mg total) by mouth daily, Disp: 30 tablet, Rfl: 5    lisinopril-hydrochlorothiazide (PRINZIDE,ZESTORETIC) 10-12 5 MG per tablet, Take 1 tablet by mouth daily, Disp: 30 tablet, Rfl: 5    montelukast (SINGULAIR) 10 mg tablet, Take 1 tablet (10 mg total) by mouth daily at bedtime, Disp: 30 tablet, Rfl: 5    No Known Allergies    Physical Exam:    /82   Pulse 96   Ht 5' 3" (1 6 m)   Wt 84 8 kg (187 lb)   BMI 33 13 kg/m²     Constitutional: normal, well developed, well nourished, alert, in no distress and non-toxic and no overt pain behavior  and obese  Eyes: anicteric  HEENT: grossly intact  Neck: supple, symmetric, trachea midline and no masses   Pulmonary:even and unlabored  Cardiovascular:No edema or pitting edema present  Skin:Normal without rashes or lesions and well hydrated  Psychiatric:Mood and affect appropriate  Neurologic:Cranial Nerves II-XII grossly intact  Musculoskeletal:antalgic     Cervical Spine examination demonstrates  Decreased ROM secondary to pain with lateral rotation to the left/right and bending to the left/right, in addition to neck flexion  5/5 upper extremity strength in all muscle groups bilaterally  Negative Spurling's maneuver to the b/l Ue, sensitivity to light touch intact b/l Ue  Lumbar/Sacral Spine examination demonstrates  Decreased range of motion lumbar spine with pain upon: flexion, lateral rotation to the left/right, and bending to the left/right  Bilateral lumbar paraspinals tender to palpation  Muscle spasms noted in the lumbar area bilaterally  4/5 lower extremity strength in all muscle groups bilaterally  Positive seated straight leg raise for bilateral lower extremities  Sensitivity to light touch intact bilateral lower extremities  2+ reflexes in the patella and Achilles  No ankle clonus     Imaging  LUMBAR SPINE     INDICATION:   M54 50: Low back pain, unspecified  G89 29:  Other chronic pain      COMPARISON:  10/18/2013     VIEWS:  XR SPINE LUMBAR MINIMUM 4 VIEWS NON INJURY        FINDINGS:     There are 5 non rib bearing lumbar vertebral bodies       There is no evidence of acute fracture or destructive osseous lesion      Alignment is unremarkable       There is interval development of significant disc space narrowing at L5-S1 with endplate spurring     The pedicles appear intact      Soft tissues are unremarkable      IMPRESSION:     Degenerative disc disease at L5-S1     No compression fracture or subluxation    No orders of the defined types were placed in this encounter

## 2022-01-03 NOTE — PROGRESS NOTES
PT Evaluation     Today's date: 1/3/2022  Patient name: Flaquita Muller  : 1985  MRN: [id number]  Referring provider: Silvia Grant MD  Dx:   Encounter Diagnosis     ICD-10-CM    1  Chronic bilateral low back pain with bilateral sciatica  M54 42     M54 41     G89 29    2  Chronic cervical pain  M54 2     G89 29                   Assessment  Assessment details: Pt Abril Kitchen is a 43 y o  who presents to OPPT with s/s consistent with chronic lumbar and cervical spine pain  Pt presents with limited L/S and C/S mobility and core strength deficits, decreased B UE/LE ROM and strength, impaired soft tissue mobility/limited flexibility, decreased postural awareness, and gait/balance dysfunctions  Pt with limitations with prolonged ambulation, difficulty with stair negotiation, disrupted sleep patterns, and difficulty with lifting/carrying  She also notes intermittent N/T into B UE/B LE; symptoms worse into B LE with prolonged sitting and worse into B UE with any UE sustained positioning shoulder heigh to OH  Pt has become more sedentary due to limitations and has difficulty completing daily activities at home due to increased pain  Pt would benefit from skilled therapy services to address outlined impairments, work towards goals, and restore pts PLOF  Thank you!    Impairments: abnormal gait, abnormal or restricted ROM, abnormal movement, activity intolerance, impaired balance, impaired physical strength, lacks appropriate home exercise program, pain with function, weight-bearing intolerance, poor posture  and poor body mechanics    Goals  STGs to be achieved in 4 weeks:  -Pt to demonstrate reduced subjective pain rating "at worst" by at least 2-3 points from Initial Eval to allow for reduced pain with ADLs and improved functional activity tolerance    -Pt to demonstrate ROM improved to WNL in order to maximize joint mobility and function and allow for progression of exercise program and achievement of goals    -Pt to demonstrate increased MMT of BUE/BLE by at least 1/2 grade in order to improve safety and stability with ADLs and functional mobility  LTGs to be achieved upon discharge:   -Pt will be I with HEP in order to continue to improve quality of life and independence and reduce risk for re-injury    -Pt to demonstrate return to activities of daily living without limiations or restrictions    -Pt will return to sitting tolerance > 1 hour without radicular symptoms to help facilitate return to work activities independently   -Pt to demonstrate return to lifting/carrying > 5# without radicular symptoms to return to daily household chores   -Pt to demonstrate improved function as noted by achieving or exceeding predicted score on FOTO outcomes assessment tool  Plan  Plan details: PT provided pt with detailed HEP program; pt verbalized understanding of program    Patient would benefit from: skilled physical therapy  Planned modality interventions: cryotherapy and thermotherapy: hydrocollator packs  Planned therapy interventions: abdominal trunk stabilization, balance, manual therapy, neuromuscular re-education, patient education, postural training, therapeutic activities, therapeutic exercise, home exercise program, flexibility and functional ROM exercises  Frequency: 2x week  Duration in weeks: 4  Plan of Care beginning date: 1/3/2022  Plan of Care expiration date: 2/4/2022  Treatment plan discussed with: patient        Subjective Evaluation    History of Present Illness  Mechanism of injury: Pt reporting that she has been having back and neck pain for 10+ years  She notes that she previously lived in [place] and "they wouldn't do anything to treat it with her insurance"  She recently moved back to [place] and is now starting to see new doctors re: her pain  She just recently began with pain management who is referring to OPPT for conservative management of s/s   MD wants MRI but insurance has denied until she completes therapy  She will return to MD again in 6 weeks     Quality of life: good    Pain  At best pain ratin  At worst pain ratin  Quality: tight  Relieving factors: change in position  Aggravating factors: sitting, lifting, overhead activity and stair climbing    Social Support  Stairs in house: yes     Employment status: working (sitting majority of the day )    Diagnostic Tests  No diagnostic tests performed  Treatments  Previous treatment: physical therapy and medication  Current treatment: physical therapy  Patient Goals  Patient goals for therapy: decreased pain, increased motion, increased strength, independence with ADLs/IADLs and return to sport/leisure activities          Objective     Postural Observations  Seated posture: fair  Standing posture: fair        Neurological Testing     Sensation   Cervical/Thoracic   Left   Intact: light touch  Paresthesia: light touch    Right   Intact: light touch  Paresthesia: light touch    Lumbar   Left   Intact: light touch  Paresthesia: light touch    Right   Intact: light touch  Paresthesia: light touch    Active Range of Motion   Cervical/Thoracic Spine       Cervical    Flexion: 45 degrees  with pain  Extension: 35 degrees     with pain  Left lateral flexion: 35 degrees     with pain  Right lateral flexion: 35 degrees     with pain  Left rotation: 45 degrees with pain  Right rotation: 45 degrees    with pain    Lumbar   Flexion:  WFL and with pain  Extension:  with pain Restriction level: moderate  Left lateral flexion:  Restriction level: minimal  Right lateral flexion:  Restriction level: minimal  Mechanical Assessment    Cervical      Thoracic    Seated flexion: sustained positions  Pain location: peripheralized  Pain level: increased    Lumbar    Standing extension: repeated movements  Pain location: centralized  Pain level: decreased    Strength/Myotome Testing     Left Shoulder     Planes of Motion   Flexion: 4   Abduction: 4-     Right Shoulder Planes of Motion   Flexion: 4   Abduction: 4-     Left Hip   Planes of Motion   Flexion: 4-  Abduction: 4-  Adduction: 4-    Right Hip   Planes of Motion   Flexion: 3+  Abduction: 3+  Adduction: 3+    Tests   Cervical   Positive vertical compression and neck flexor muscle endurance test     Lumbar   Positive vertical compression   Left   Positive crossed SLR and passive SLR  Right   Positive crossed SLR and passive SLR                Re-eval Date: 2/4/22     Precautions: chronic pain; high sx irritability        Manuals 1/3                                       Neuro Re-Ed         PPT with jennifer         TA + OH lifts        MTP/LTP        Palloff press         Wall angels        Ball on wall                 Postural ed HZ        Ther Ex        NuStep         UBE        Scapular retractions  Post shld rolls HEP        C/S isometrics         C/S retractions         Shoulder flex/abd to 90*         Standing hip flex/abd/ext        Squats         Step-ups         LTR        SKTC        SLR        S/L SLR        Clamshells         Heel walk outs         Bridges         Doorway stretch        UT stretch         SA punches         Prone lying  Prone prop  PPU  HEP                        Ther Activity                        Gait Training                        Modalities

## 2022-01-07 ENCOUNTER — OFFICE VISIT (OUTPATIENT)
Dept: PHYSICAL THERAPY | Facility: HOME HEALTHCARE | Age: 37
End: 2022-01-07
Payer: COMMERCIAL

## 2022-01-07 DIAGNOSIS — M54.42 CHRONIC BILATERAL LOW BACK PAIN WITH BILATERAL SCIATICA: Primary | ICD-10-CM

## 2022-01-07 DIAGNOSIS — G89.29 CHRONIC BILATERAL LOW BACK PAIN WITH BILATERAL SCIATICA: Primary | ICD-10-CM

## 2022-01-07 DIAGNOSIS — M54.41 CHRONIC BILATERAL LOW BACK PAIN WITH BILATERAL SCIATICA: Primary | ICD-10-CM

## 2022-01-07 PROCEDURE — 97112 NEUROMUSCULAR REEDUCATION: CPT

## 2022-01-07 PROCEDURE — 97110 THERAPEUTIC EXERCISES: CPT

## 2022-01-07 NOTE — PROGRESS NOTES
Daily Note     Today's date: 2022  Patient name: León Moody  : 1985  MRN: [id number]  Referring provider: Stephanie Olvera MD  Dx: No diagnosis found  Start Time: 0900          Subjective: Pain of 5/10 at LB  My neck isn't to bad today  My LB is really stiff from not moving t/o the night  I have sciatica on my R side and pain in my L knee  Objective: See treatment diary below    Assessment: Pt tito initiation of TE as per flow sheet well but does require vc's for correct form and posture  Pt with report of LBP of 5/10 t/o session with R > L  Cerv pain and tightness of 3/10  Pt denied increased pain with added TE  Patient would benefit from continued PT    Plan: Continue per plan of care        Re-eval Date: 22     Precautions: chronic pain; high sx irritability        Manuals 1/3 1-7-22                                      Neuro Re-Ed   22      PPT  5" x 10      PPT with jennifer         TA + OH lifts        MTP/LTP  NV      Palloff press   NV      Wall angels  1x10      Ball on wall                 Postural ed HZ        Ther Ex  22      NuStep   L 1  10'      UBE        Scapular retractions  Post shld rolls HEP  1 x 10 ea       C/S isometrics         C/S retractions   5" x10      Shoulder flex/abd to 90*   1x10 ea Talha      Standing hip flex/abd/ext  1 x 10 Talha   Flex/abd      Squats   1x10      Step-ups         LTR  1x10      SKTC  5" x5      SLR        S/L SLR        Clamshells         Heel walk outs         Bridges   1x10      Doorway stretch  15" x3      UT stretch         SA punches   1x10      Prone lying  Prone prop  PPU  HEP                        Ther Activity                        Gait Training                        Modalities

## 2022-01-11 ENCOUNTER — OFFICE VISIT (OUTPATIENT)
Dept: PHYSICAL THERAPY | Facility: HOME HEALTHCARE | Age: 37
End: 2022-01-11
Payer: COMMERCIAL

## 2022-01-11 DIAGNOSIS — G89.29 CHRONIC BILATERAL LOW BACK PAIN WITH BILATERAL SCIATICA: Primary | ICD-10-CM

## 2022-01-11 DIAGNOSIS — M54.42 CHRONIC BILATERAL LOW BACK PAIN WITH BILATERAL SCIATICA: Primary | ICD-10-CM

## 2022-01-11 DIAGNOSIS — M54.41 CHRONIC BILATERAL LOW BACK PAIN WITH BILATERAL SCIATICA: Primary | ICD-10-CM

## 2022-01-11 DIAGNOSIS — M54.2 CHRONIC CERVICAL PAIN: ICD-10-CM

## 2022-01-11 DIAGNOSIS — G89.29 CHRONIC CERVICAL PAIN: ICD-10-CM

## 2022-01-11 PROCEDURE — 97112 NEUROMUSCULAR REEDUCATION: CPT

## 2022-01-11 PROCEDURE — 97110 THERAPEUTIC EXERCISES: CPT

## 2022-01-11 NOTE — PROGRESS NOTES
Daily Note     Today's date: 2022  Patient name: Vaishali Douglas  : 1985  MRN: [id number]  Referring provider: Nuvia Mcdermott MD  Dx:   Encounter Diagnosis     ICD-10-CM    1  Chronic bilateral low back pain with bilateral sciatica  M54 42     M54 41     G89 29    2  Chronic cervical pain  M54 2     G89 29                   Subjective: Patient reports minimal change in LBP and rates LBP as 7/10 at beginning of session  She says the pain medication they prescribed has not been helping and has even upset her stomach  She reports her arms were a little sore following last session  Objective: See treatment diary below      Assessment: Tolerated treatment well with no increase in LBP throughout  Patient required verbal cues to avoid holding breath when performing bridges and to avoid trunk lean when performing standing hip series  Patient demonstrated moderate fatigue post session  Patient would benefit from continued PT to increase trunk mobility and core strength for improved function in ADLs  Plan: Continue per plan of care        Re-eval Date: 22     Precautions: chronic pain; high sx irritability        Manuals 1/3 1-7-22 1/11/22                                     Neuro Re-Ed   22      PPT  5" x 10 5"x15     PPT with marches         TA + OH lifts        MTP/LTP  NV RTB 2x10     Palloff press   NV RTB 10x ea     Wall angels  1x10 1x10     Ball on wall                 Postural ed HZ        Ther Ex  22      NuStep   L 1  10' L1 10'     UBE        Scapular retractions  Post shld rolls HEP  1 x 10 ea  2x10 ea     C/S isometrics         C/S retractions   5" x10 5"x15     Shoulder flex/abd to 90*   1x10 ea Talha 1x15 ea bilat     Standing hip flex/abd/ext  1 x 10 Talha   Flex/abd 1 x 10 Talha   Flex/abd/ext     Squats   1x10 2x10     Step-ups         LTR  1x10 10"x10     SKTC  5" x5 5"x5 ea     SLR        S/L SLR        Clamshells         Heel walk outs         Bridges   1x10 2x10 Doorway stretch  15" x3 15"x3     UT stretch         SA punches   1x10 2x10     Prone lying  Prone prop  PPU  HEP                        Ther Activity                        Gait Training                        Modalities

## 2022-01-13 ENCOUNTER — OFFICE VISIT (OUTPATIENT)
Dept: PHYSICAL THERAPY | Facility: HOME HEALTHCARE | Age: 37
End: 2022-01-13
Payer: COMMERCIAL

## 2022-01-13 DIAGNOSIS — G89.29 CHRONIC BILATERAL LOW BACK PAIN WITH BILATERAL SCIATICA: Primary | ICD-10-CM

## 2022-01-13 DIAGNOSIS — M54.41 CHRONIC BILATERAL LOW BACK PAIN WITH BILATERAL SCIATICA: Primary | ICD-10-CM

## 2022-01-13 DIAGNOSIS — M54.42 CHRONIC BILATERAL LOW BACK PAIN WITH BILATERAL SCIATICA: Primary | ICD-10-CM

## 2022-01-13 PROCEDURE — 97112 NEUROMUSCULAR REEDUCATION: CPT

## 2022-01-13 PROCEDURE — 97110 THERAPEUTIC EXERCISES: CPT

## 2022-01-13 NOTE — PROGRESS NOTES
Daily Note     Today's date: 2022  Patient name: Richard Betancur  : 1985  MRN: [id number]  Referring provider: Prisca Pineda MD  Dx: No diagnosis found  Start Time: 825          Subjective: LBP of 5/10  My arms and sh blades were sore for a while after last visit  Objective: See treatment diary below    Assessment: Tolerated treatment well  VC's needed for correct form and for proper lumbar stabilization t/o session  Pt denied increased LB pain but did report some cerv discomfort during bridges  Patient would benefit from continued PT    Plan: Continue per plan of care        Re-eval Date: 22     Precautions: chronic pain; high sx irritability        Manuals 1/3 1-7-22 1/11/22 1-13-22                                    Neuro Re-Ed   22    PPT  5" x 10 5"x15 5" x 15    PPT with jennifer         TA + OH lifts        MTP/LTP  NV RTB 2x10 Red 2x10    Palloff press   NV RTB 10x ea Red 2x10    Wall angels  1x10 1x10 1x10    Ball on wall                 Postural ed HZ        Ther Ex  22    NuStep   L 1  10' L1 10' L 2  10'    UBE        Scapular retractions  Post shld rolls HEP  1 x 10 ea  2x10 ea 2x10 ea    C/S isometrics         C/S retractions   5" x10 5"x15 5" x15    Shoulder flex/abd to 90*   1x10 ea Talha 1x15 ea bilat 1x15 ea bl    Standing hip flex/abd/ext  1 x 10 Talha   Flex/abd 1 x 10 Talha   Flex/abd/ext Steamboats  1x10ea    Squats   1x10 2x10 2x10    Step-ups         LTR  1x10 10"x10 10" x10    SKTC  5" x5 5"x5 ea 5"x5 ea    SLR        S/L SLR        Clamshells         Heel walk outs         Bridges   1x10 2x10 2x10    Doorway stretch  15" x3 15"x3 15" x3    UT stretch         SA punches   1x10 2x10 2x10    Prone lying  Prone prop  PPU  HEP                        Ther Activity                        Gait Training                        Modalities

## 2022-01-14 ENCOUNTER — APPOINTMENT (OUTPATIENT)
Dept: PHYSICAL THERAPY | Facility: HOME HEALTHCARE | Age: 37
End: 2022-01-14
Payer: COMMERCIAL

## 2022-01-18 ENCOUNTER — OFFICE VISIT (OUTPATIENT)
Dept: PHYSICAL THERAPY | Facility: HOME HEALTHCARE | Age: 37
End: 2022-01-18
Payer: COMMERCIAL

## 2022-01-18 DIAGNOSIS — M54.41 CHRONIC BILATERAL LOW BACK PAIN WITH BILATERAL SCIATICA: Primary | ICD-10-CM

## 2022-01-18 DIAGNOSIS — M54.42 CHRONIC BILATERAL LOW BACK PAIN WITH BILATERAL SCIATICA: Primary | ICD-10-CM

## 2022-01-18 DIAGNOSIS — M54.2 CHRONIC CERVICAL PAIN: ICD-10-CM

## 2022-01-18 DIAGNOSIS — G89.29 CHRONIC CERVICAL PAIN: ICD-10-CM

## 2022-01-18 DIAGNOSIS — G89.29 CHRONIC BILATERAL LOW BACK PAIN WITH BILATERAL SCIATICA: Primary | ICD-10-CM

## 2022-01-18 PROCEDURE — 97110 THERAPEUTIC EXERCISES: CPT

## 2022-01-18 PROCEDURE — 97112 NEUROMUSCULAR REEDUCATION: CPT

## 2022-01-18 NOTE — PROGRESS NOTES
Daily Note     Today's date: 2022  Patient name: Liam Love  : 1985  MRN: [id number]  Referring provider: Sandi Angulo MD  Dx:   Encounter Diagnosis     ICD-10-CM    1  Chronic bilateral low back pain with bilateral sciatica  M54 42     M54 41     G89 29    2  Chronic cervical pain  M54 2     G89 29                   Subjective: Pt reports that she is still having low back pain that has not changed since coming to therapy  Objective: See treatment diary below    Assessment: Tolerated treatment well  She was able to perform all exercises with no complaints throughout  She did need both verbal and tactile cuing to ensure proper form with table top exercises  Minimal UT compensation was also present with standing band exercises that was corrected with cuing  Patient would benefit from continued PT    Plan: Continue per plan of care        Re-eval Date: 22     Precautions: chronic pain; high sx irritability        Manuals 1/3 1-7-22 1/11/22 1-13-22 1/18/22                                   Neuro Re-Ed   22    PPT  5" x 10 5"x15 5" x 15 5" x 15   PPT with jennifer         TA + OH lifts        MTP/LTP  NV RTB 2x10 Red 2x10 Red 2x10   Palloff press   NV RTB 10x ea Red 2x10 Red 2x10   Wall angels  1x10 1x10 1x10 1x15   Ball on wall                 Postural ed HZ        Ther Ex  22    NuStep   L 1  10' L1 10' L 2  10' L2 10'   UBE        Scapular retractions  Post shld rolls HEP  1 x 10 ea  2x10 ea 2x10 ea 2x10 ea   C/S isometrics         C/S retractions   5" x10 5"x15 5" x15 5"x15   Shoulder flex/abd to 90*   1x10 ea Aiden 1x15 ea bilat 1x15 ea bl 1x20 ea aiden   Standing hip flex/abd/ext  1 x 10 Aiden   Flex/abd 1 x 10 Aiden   Flex/abd/ext Steamboats  1x10ea Steamboats  1x15ea   Squats   1x10 2x10 2x10 2x10   Step-ups         LTR  1x10 10"x10 10" x10 10"x10   SKTC  5" x5 5"x5 ea 5"x5 ea 5"x5 ea   SLR        S/L SLR        Clamshells         Heel walk outs         [facility] Aidan 1x10 2x10 2x10 2x10   Doorway stretch  15" x3 15"x3 15" x3 15" x 3   UT stretch         SA punches   1x10 2x10 2x10 2x10   Prone lying  Prone prop  PPU  HEP                        Ther Activity                        Gait Training                        Modalities

## 2022-01-21 ENCOUNTER — APPOINTMENT (OUTPATIENT)
Dept: PHYSICAL THERAPY | Facility: HOME HEALTHCARE | Age: 37
End: 2022-01-21
Payer: COMMERCIAL

## 2022-01-25 ENCOUNTER — APPOINTMENT (OUTPATIENT)
Dept: PHYSICAL THERAPY | Facility: HOME HEALTHCARE | Age: 37
End: 2022-01-25
Payer: COMMERCIAL

## 2022-02-07 NOTE — PROGRESS NOTES
PT Discharge    Today's date: 2022  Patient name: Gary Streeter  : 1985  MRN: [id number]  Referring provider: Magdaleno Quijano MD  Dx:   Encounter Diagnosis     ICD-10-CM    1  Chronic bilateral low back pain with bilateral sciatica  M54 42     M54 41     G89 29    2  Chronic cervical pain  M54 2     G89 29        Start Time: 1005  Stop Time: 1045  Total time in clinic (min): 40 minutes    Assessment  Assessment details: Pt Janell Hernández is a 43 y o  who presents to OPPT with s/s consistent with chronic lumbar and cervical spine pain  Pt presents with limited L/S and C/S mobility and core strength deficits, decreased B UE/LE ROM and strength, impaired soft tissue mobility/limited flexibility, decreased postural awareness, and gait/balance dysfunctions  Pt with limitations with prolonged ambulation, difficulty with stair negotiation, disrupted sleep patterns, and difficulty with lifting/carrying  She also notes intermittent N/T into B UE/B LE; symptoms worse into B LE with prolonged sitting and worse into B UE with any UE sustained positioning shoulder heigh to OH  Pt has become more sedentary due to limitations and has difficulty completing daily activities at home due to increased pain  Pt would benefit from skilled therapy services to address outlined impairments, work towards goals, and restore pts PLOF  Thank you! UPDATE: Pt with frequent no show/cancellations of therapy appointments; per [facility] policy pt will be Discharged from POC at this time  PT unable to update clinical findings as pt did not return to therapy for assessment  Please refer to previous sessions for details  Thank you!    Impairments: abnormal gait, abnormal or restricted ROM, abnormal movement, activity intolerance, impaired balance, impaired physical strength, lacks appropriate home exercise program, pain with function, weight-bearing intolerance, poor posture  and poor body mechanics    Goals  STGs to be achieved in 4 weeks: - NOT MET  -Pt to demonstrate reduced subjective pain rating "at worst" by at least 2-3 points from Initial Eval to allow for reduced pain with ADLs and improved functional activity tolerance    -Pt to demonstrate ROM improved to WNL in order to maximize joint mobility and function and allow for progression of exercise program and achievement of goals    -Pt to demonstrate increased MMT of BUE/BLE by at least 1/2 grade in order to improve safety and stability with ADLs and functional mobility  LTGs to be achieved upon discharge:  - NOT MET   -Pt will be I with HEP in order to continue to improve quality of life and independence and reduce risk for re-injury    -Pt to demonstrate return to activities of daily living without limiations or restrictions    -Pt will return to sitting tolerance > 1 hour without radicular symptoms to help facilitate return to work activities independently   -Pt to demonstrate return to lifting/carrying > 5# without radicular symptoms to return to daily household chores   -Pt to demonstrate improved function as noted by achieving or exceeding predicted score on FOTO outcomes assessment tool  Plan  Plan details: DC from OPPT   Treatment plan discussed with: patient        Subjective Evaluation    History of Present Illness  Mechanism of injury: Pt reporting that she has been having back and neck pain for 10+ years  She notes that she previously lived in [place] and "they wouldn't do anything to treat it with her insurance"  She recently moved back to [place] and is now starting to see new doctors re: her pain  She just recently began with pain management who is referring to OPPT for conservative management of s/s  MD wants MRI but insurance has denied until she completes therapy  She will return to MD again in 6 weeks     Quality of life: good    Pain  At best pain ratin  At worst pain ratin  Quality: tight  Relieving factors: change in position  Aggravating factors: sitting, lifting, overhead activity and stair climbing    Social Support  Stairs in house: yes     Employment status: working (sitting majority of the day )    Diagnostic Tests  No diagnostic tests performed  Treatments  Previous treatment: physical therapy and medication  Current treatment: physical therapy  Patient Goals  Patient goals for therapy: decreased pain, increased motion, increased strength, independence with ADLs/IADLs and return to sport/leisure activities          Objective     Postural Observations  Seated posture: fair  Standing posture: fair        Neurological Testing     Sensation   Cervical/Thoracic   Left   Intact: light touch  Paresthesia: light touch    Right   Intact: light touch  Paresthesia: light touch    Lumbar   Left   Intact: light touch  Paresthesia: light touch    Right   Intact: light touch  Paresthesia: light touch    Active Range of Motion   Cervical/Thoracic Spine       Cervical    Flexion: 45 degrees  with pain  Extension: 35 degrees     with pain  Left lateral flexion: 35 degrees     with pain  Right lateral flexion: 35 degrees     with pain  Left rotation: 45 degrees with pain  Right rotation: 45 degrees    with pain    Lumbar   Flexion:  WFL and with pain  Extension:  with pain Restriction level: moderate  Left lateral flexion:  Restriction level: minimal  Right lateral flexion:  Restriction level: minimal  Mechanical Assessment    Cervical      Thoracic    Seated flexion: sustained positions  Pain location: peripheralized  Pain level: increased    Lumbar    Standing extension: repeated movements  Pain location: centralized  Pain level: decreased    Strength/Myotome Testing     Left Shoulder     Planes of Motion   Flexion: 4   Abduction: 4-     Right Shoulder     Planes of Motion   Flexion: 4   Abduction: 4-     Left Hip   Planes of Motion   Flexion: 4-  Abduction: 4-  Adduction: 4-    Right Hip   Planes of Motion   Flexion: 3+  Abduction: 3+  Adduction: 3+    Tests   Cervical   Positive vertical compression and neck flexor muscle endurance test     Lumbar   Positive vertical compression   Left   Positive crossed SLR and passive SLR  Right   Positive crossed SLR and passive SLR

## 2022-02-10 ENCOUNTER — OFFICE VISIT (OUTPATIENT)
Dept: FAMILY MEDICINE CLINIC | Facility: HOME HEALTHCARE | Age: 37
End: 2022-02-10
Payer: COMMERCIAL

## 2022-02-10 VITALS
DIASTOLIC BLOOD PRESSURE: 74 MMHG | WEIGHT: 188 LBS | HEART RATE: 85 BPM | SYSTOLIC BLOOD PRESSURE: 128 MMHG | TEMPERATURE: 96.5 F | HEIGHT: 63 IN | RESPIRATION RATE: 16 BRPM | OXYGEN SATURATION: 98 % | BODY MASS INDEX: 33.31 KG/M2

## 2022-02-10 DIAGNOSIS — G89.29 CHRONIC BILATERAL LOW BACK PAIN, UNSPECIFIED WHETHER SCIATICA PRESENT: ICD-10-CM

## 2022-02-10 DIAGNOSIS — F17.210 CIGARETTE NICOTINE DEPENDENCE WITHOUT COMPLICATION: ICD-10-CM

## 2022-02-10 DIAGNOSIS — M54.12 CERVICAL RADICULOPATHY: ICD-10-CM

## 2022-02-10 DIAGNOSIS — R73.03 PRE-DIABETES: ICD-10-CM

## 2022-02-10 DIAGNOSIS — M54.50 CHRONIC BILATERAL LOW BACK PAIN, UNSPECIFIED WHETHER SCIATICA PRESENT: ICD-10-CM

## 2022-02-10 DIAGNOSIS — F50.81 BINGE EATING DISORDER: ICD-10-CM

## 2022-02-10 DIAGNOSIS — J45.50 SEVERE PERSISTENT ASTHMA WITHOUT COMPLICATION: Primary | ICD-10-CM

## 2022-02-10 DIAGNOSIS — M51.36 DDD (DEGENERATIVE DISC DISEASE), LUMBAR: ICD-10-CM

## 2022-02-10 DIAGNOSIS — I10 PRIMARY HYPERTENSION: ICD-10-CM

## 2022-02-10 PROCEDURE — T1015 CLINIC SERVICE: HCPCS | Performed by: FAMILY MEDICINE

## 2022-02-10 PROCEDURE — 99213 OFFICE O/P EST LOW 20 MIN: CPT | Performed by: FAMILY MEDICINE

## 2022-02-10 RX ORDER — TOPIRAMATE 25 MG/1
25 TABLET ORAL DAILY
Qty: 30 TABLET | Refills: 5 | Status: SHIPPED | OUTPATIENT
Start: 2022-02-10

## 2022-02-10 NOTE — PROGRESS NOTES
[address]       NAME: Maicol Mcneil is a 43 y o  female  : 1985    MRN: [id number]  DATE: February 10, 2022  TIME: 10:55 AM    Assessment and Plan   Diagnoses and all orders for this visit:    Severe persistent asthma without complication    Cigarette nicotine dependence without complication  Comments:  Patient states started smoking age 13  Primary hypertension    Pre-diabetes    BMI 33 0-33 9,adult  -     Ambulatory Referral to Weight Management; Future    Binge eating disorder  -     Ambulatory Referral to Weight Management; Future  -     topiramate (Topamax) 25 mg tablet; Take 1 tablet (25 mg total) by mouth daily    DDD (degenerative disc disease), lumbar    Chronic bilateral low back pain, unspecified whether sciatica present    Cervical radiculopathy      Patient has upcoming appointment with pain management  States discontinue her Cymbalta on her own  Patient concerned about her weight referral to weight management and started on Topamax due to history of binge eating  Blood pressure nicely controlled  Smoking cessation discussed and encouraged  Will repeat A1c at next visit due to A1c 5 7 and prediabetic range  Patient states will work on diet exercise weight loss and lifestyle modifications  Will continue albuterol and Symbicort for her asthma symptoms  Instructed to call me with any questions or concerns    Chief Complaint     Chief Complaint   Patient presents with    Follow-up         History of Present Illness       HPI   38-year-old female here today for routine follow-up visit  Patient's has concerns about her weight and excessive eating  Patient is hoping to be placed on medication and maybe be evaluated by weight management  Patient's last A1c is 5 7 putting her in prediabetic range  Patient is working on diet exercise lifestyle modifications weight loss  Patient's blood pressure nicely controlled    Patient following with pain management  Review of Systems Review of Systems   as per HPI   positive chronic back  Pain  Frequent asthma symptoms/ROBERTS  All other systems negative    Current Medications       Current Outpatient Medications:     albuterol (PROVENTIL HFA,VENTOLIN HFA) 90 mcg/act inhaler, Inhale 2 puffs every 6 (six) hours as needed for wheezing, Disp: 18 g, Rfl: 5    budesonide-formoterol (Symbicort) 160-4 5 mcg/act inhaler, Inhale 2 puffs 2 (two) times a day Rinse mouth after use , Disp: 10 2 g, Rfl: 5    DULoxetine (CYMBALTA) 20 mg capsule, Take 1 capsule (20 mg total) by mouth daily, Disp: 30 capsule, Rfl: 1    fexofenadine (ALLEGRA) 180 MG tablet, Take 1 tablet (180 mg total) by mouth daily, Disp: 30 tablet, Rfl: 5    lisinopril-hydrochlorothiazide (PRINZIDE,ZESTORETIC) 10-12 5 MG per tablet, Take 1 tablet by mouth daily, Disp: 30 tablet, Rfl: 5    montelukast (SINGULAIR) 10 mg tablet, Take 1 tablet (10 mg total) by mouth daily at bedtime, Disp: 30 tablet, Rfl: 5    topiramate (Topamax) 25 mg tablet, Take 1 tablet (25 mg total) by mouth daily, Disp: 30 tablet, Rfl: 5    Current Allergies     Allergies as of 02/10/2022    (No Known Allergies)            The following portions of the patient's history were reviewed and updated as appropriate: allergies, current medications, past family history, past medical history, past social history, past surgical history and problem list      Past Medical History:   Diagnosis Date    Asthma     Hypertension        Past Surgical History:   Procedure Laterality Date     SECTION      TUBAL LIGATION         History reviewed  No pertinent family history  Medications have been verified  Objective   /74   Pulse 85   Temp (!) 96 5 °F (35 8 °C)   Resp 16   Ht 5' 3" (1 6 m)   Wt 85 3 kg (188 lb)   SpO2 98%   BMI 33 30 kg/m²        Physical Exam     Physical Exam  Vitals and nursing note reviewed  Constitutional:       General: She is not in acute distress       Appearance: She is not ill-appearing, toxic-appearing or diaphoretic  HENT:      Head: Normocephalic  Eyes:      General: No scleral icterus  Conjunctiva/sclera: Conjunctivae normal    Cardiovascular:      Rate and Rhythm: Normal rate and regular rhythm  Heart sounds: Normal heart sounds  Pulmonary:      Effort: Pulmonary effort is normal       Breath sounds: Normal breath sounds  Abdominal:      General: Bowel sounds are normal       Palpations: Abdomen is soft  Tenderness: There is no abdominal tenderness  Musculoskeletal:      Right lower leg: No edema  Left lower leg: No edema  Neurological:      General: No focal deficit present  Mental Status: She is alert and oriented to person, place, and time     Psychiatric:         Mood and Affect: Mood normal

## 2022-03-01 DIAGNOSIS — M54.2 NECK PAIN: ICD-10-CM

## 2022-03-01 DIAGNOSIS — M51.36 LUMBAR DEGENERATIVE DISC DISEASE: ICD-10-CM

## 2022-03-01 DIAGNOSIS — G89.29 CHRONIC BILATERAL LOW BACK PAIN, UNSPECIFIED WHETHER SCIATICA PRESENT: ICD-10-CM

## 2022-03-01 DIAGNOSIS — M54.50 CHRONIC BILATERAL LOW BACK PAIN, UNSPECIFIED WHETHER SCIATICA PRESENT: ICD-10-CM

## 2022-03-01 DIAGNOSIS — M54.12 CERVICAL RADICULOPATHY: ICD-10-CM

## 2022-03-01 RX ORDER — DULOXETIN HYDROCHLORIDE 20 MG/1
CAPSULE, DELAYED RELEASE ORAL
Qty: 30 CAPSULE | Refills: 1 | Status: SHIPPED | OUTPATIENT
Start: 2022-03-01

## 2022-04-17 DIAGNOSIS — J45.50 SEVERE PERSISTENT ASTHMA, UNSPECIFIED WHETHER COMPLICATED: ICD-10-CM

## 2022-04-17 DIAGNOSIS — I10 HYPERTENSION, UNSPECIFIED TYPE: ICD-10-CM

## 2022-04-17 DIAGNOSIS — J30.2 SEASONAL ALLERGIES: ICD-10-CM

## 2022-04-18 RX ORDER — MONTELUKAST SODIUM 10 MG/1
TABLET ORAL
Qty: 30 TABLET | Refills: 5 | Status: SHIPPED | OUTPATIENT
Start: 2022-04-18

## 2022-04-18 RX ORDER — LISINOPRIL AND HYDROCHLOROTHIAZIDE 12.5; 1 MG/1; MG/1
TABLET ORAL
Qty: 30 TABLET | Refills: 5 | Status: SHIPPED | OUTPATIENT
Start: 2022-04-18

## 2022-05-11 DIAGNOSIS — J45.50 SEVERE PERSISTENT ASTHMA, UNSPECIFIED WHETHER COMPLICATED: ICD-10-CM

## 2022-05-13 RX ORDER — ALBUTEROL SULFATE 90 UG/1
AEROSOL, METERED RESPIRATORY (INHALATION)
Qty: 18 G | Refills: 11 | Status: SHIPPED | OUTPATIENT
Start: 2022-05-13

## 2022-06-12 DIAGNOSIS — J45.50 SEVERE PERSISTENT ASTHMA, UNSPECIFIED WHETHER COMPLICATED: ICD-10-CM

## 2022-06-13 RX ORDER — BUDESONIDE AND FORMOTEROL FUMARATE DIHYDRATE 160; 4.5 UG/1; UG/1
AEROSOL RESPIRATORY (INHALATION)
Qty: 10.2 G | Refills: 5 | Status: SHIPPED | OUTPATIENT
Start: 2022-06-13

## 2022-08-23 ENCOUNTER — TELEPHONE (OUTPATIENT)
Dept: PAIN MEDICINE | Facility: CLINIC | Age: 37
End: 2022-08-23

## 2022-08-23 DIAGNOSIS — M51.36 DDD (DEGENERATIVE DISC DISEASE), LUMBAR: ICD-10-CM

## 2022-08-23 DIAGNOSIS — G89.29 CHRONIC BILATERAL LOW BACK PAIN, UNSPECIFIED WHETHER SCIATICA PRESENT: ICD-10-CM

## 2022-08-23 DIAGNOSIS — M54.12 CERVICAL RADICULOPATHY: Primary | ICD-10-CM

## 2022-08-23 DIAGNOSIS — M54.2 NECK PAIN: ICD-10-CM

## 2022-08-23 DIAGNOSIS — M54.50 CHRONIC BILATERAL LOW BACK PAIN, UNSPECIFIED WHETHER SCIATICA PRESENT: ICD-10-CM

## 2022-08-23 NOTE — TELEPHONE ENCOUNTER
S/W pt  Pt requesting PT for lower back and neck  Pt stated her neck was getting worse with PT so she stopped  She went for 1 month and stopped PT in March  Now her left arm gets numb  She is trying to do PT so she can get her MRI done  Please advise

## 2022-08-23 NOTE — TELEPHONE ENCOUNTER
Pt called in to request the doctor to add a referral for Physical Therapy to [facility]  Pt would like to start physical therapy again  Please be advised thank you    Pt can be reached @   [phone]

## 2022-08-23 NOTE — TELEPHONE ENCOUNTER
S/W pt  Advised pt of the same  Pt stated she goes to [facility]  Pt verbalized understanding

## 2022-09-11 ENCOUNTER — HOSPITAL ENCOUNTER (EMERGENCY)
Facility: HOSPITAL | Age: 37
Discharge: HOME | End: 2022-09-11
Attending: EMERGENCY MEDICINE
Payer: COMMERCIAL

## 2022-09-11 VITALS
DIASTOLIC BLOOD PRESSURE: 99 MMHG | WEIGHT: 180 LBS | HEART RATE: 103 BPM | RESPIRATION RATE: 18 BRPM | HEIGHT: 63 IN | SYSTOLIC BLOOD PRESSURE: 169 MMHG | OXYGEN SATURATION: 99 % | BODY MASS INDEX: 31.89 KG/M2 | TEMPERATURE: 98.2 F

## 2022-09-11 DIAGNOSIS — M62.838 TRAPEZIUS MUSCLE SPASM: Primary | ICD-10-CM

## 2022-09-11 PROCEDURE — 99283 EMERGENCY DEPT VISIT LOW MDM: CPT

## 2022-09-11 PROCEDURE — 99284 EMERGENCY DEPT VISIT MOD MDM: CPT | Performed by: PHYSICIAN ASSISTANT

## 2022-09-11 PROCEDURE — 96372 THER/PROPH/DIAG INJ SC/IM: CPT

## 2022-09-11 RX ORDER — ACETAMINOPHEN 325 MG/1
975 TABLET ORAL ONCE
Status: COMPLETED | OUTPATIENT
Start: 2022-09-11 | End: 2022-09-11

## 2022-09-11 RX ORDER — NAPROXEN 500 MG/1
500 TABLET ORAL 2 TIMES DAILY WITH MEALS
Qty: 30 TABLET | Refills: 0 | Status: SHIPPED | OUTPATIENT
Start: 2022-09-11

## 2022-09-11 RX ORDER — DIAZEPAM 2 MG/1
2 TABLET ORAL ONCE
Status: COMPLETED | OUTPATIENT
Start: 2022-09-11 | End: 2022-09-11

## 2022-09-11 RX ORDER — KETOROLAC TROMETHAMINE 30 MG/ML
15 INJECTION, SOLUTION INTRAMUSCULAR; INTRAVENOUS ONCE
Status: COMPLETED | OUTPATIENT
Start: 2022-09-11 | End: 2022-09-11

## 2022-09-11 RX ORDER — DEXAMETHASONE SODIUM PHOSPHATE 4 MG/ML
8 INJECTION, SOLUTION INTRA-ARTICULAR; INTRALESIONAL; INTRAMUSCULAR; INTRAVENOUS; SOFT TISSUE ONCE
Status: COMPLETED | OUTPATIENT
Start: 2022-09-11 | End: 2022-09-11

## 2022-09-11 RX ORDER — METHOCARBAMOL 500 MG/1
500 TABLET, FILM COATED ORAL 2 TIMES DAILY
Qty: 20 TABLET | Refills: 0 | Status: SHIPPED | OUTPATIENT
Start: 2022-09-11

## 2022-09-11 RX ADMIN — KETOROLAC TROMETHAMINE 15 MG: 30 INJECTION, SOLUTION INTRAMUSCULAR at 17:36

## 2022-09-11 RX ADMIN — DIAZEPAM 2 MG: 2 TABLET ORAL at 17:36

## 2022-09-11 RX ADMIN — DEXAMETHASONE SODIUM PHOSPHATE 8 MG: 4 INJECTION, SOLUTION INTRAMUSCULAR; INTRAVENOUS at 17:36

## 2022-09-11 RX ADMIN — ACETAMINOPHEN 975 MG: 325 TABLET ORAL at 17:35

## 2022-09-11 NOTE — DISCHARGE INSTRUCTIONS
Apply moist heat to your neck 3 times per day over the next few days  Take naproxen twice daily with food while your pain lasts  You may also take 1000 mg of Tylenol 3 times daily  You may also use Robaxin which is a muscle relaxer  It may make you drowsy  Do not drive, drink alcohol or operate machinery on this medicine  Follow-up with [facility] is managing your neck pain chronically  Return to the emergency department with any weakness of the arm or other significant change or worsening of your symptoms

## 2022-09-11 NOTE — ED NOTES
Visitor at bedside       Curt Moreira, [facility]  09/11/22 [id number]

## 2022-09-12 NOTE — ED PROVIDER NOTES
History  Chief Complaint   Patient presents with    Neck Pain     Has been having neck pain for about a year and has been going to PT for it but has recently stopped  About three days ago the pain became more intense then before  She is unable to sleep or get comfortable  10/10 pain scale with stabbing/shooting pain down the left arm causing some numbness also  Pt is unsure what caused the increase in neck pain     20-year-old female history of asthma and hypertension presents accompanied by her significant other complaining left-sided neck pain  Patient reports she has been dealing with pain in her neck for the past year  Reports over the past few days her pain is significantly worse  Has intermittently tried Tylenol and ibuprofen with minimal relief  Has also tried icy Hot which she states made it feel worse  She states that the area from her neck to her shoulder feels like there is a knot in there that was worsened by icy Hot  She reports that it is worse with neck movement and certain positions  She denies any recent trauma to her head, neck or back  Denies any weakness of either extremity but does endorse some intermittent paresthesias down her left arm when her pain is worse  Denies any other complaints or concerns at this time  Denies any ripping or tearing sensation  No headache, visual changes, unilateral weakness  Prior to Admission Medications   Prescriptions Last Dose Informant Patient Reported? Taking?    DULoxetine (CYMBALTA) 20 mg capsule   No No   Sig: take 1 capsule by mouth once daily   Symbicort 160-4 5 MCG/ACT inhaler   No No   Sig: inhale 2 puffs by mouth twice a day Rinse mouth after use   albuterol (PROVENTIL HFA,VENTOLIN HFA) 90 mcg/act inhaler   No No   Sig: inhale 2 puffs by mouth and INTO THE LUNGS every 6 hours if needed for wheezing   fexofenadine (ALLEGRA) 180 MG tablet   No No   Sig: Take 1 tablet (180 mg total) by mouth daily   lisinopril-hydrochlorothiazide (PRINZIDE,ZESTORETIC) 10-12 5 MG per tablet Not Taking at Unknown time  No No   Sig: take 1 tablet by mouth once daily   Patient not taking: Reported on 2022   montelukast (SINGULAIR) 10 mg tablet   No No   Sig: take 1 tablet by mouth at bedtime   topiramate (Topamax) 25 mg tablet   No No   Sig: Take 1 tablet (25 mg total) by mouth daily      Facility-Administered Medications: None       Past Medical History:   Diagnosis Date    Asthma     Hypertension        Past Surgical History:   Procedure Laterality Date     SECTION      TUBAL LIGATION         History reviewed  No pertinent family history  I have reviewed and agree with the history as documented  E-Cigarette/Vaping    E-Cigarette Use Never User      E-Cigarette/Vaping Substances    Nicotine No     THC No     CBD No     Flavoring No     Other No     Unknown No      Social History     Tobacco Use    Smoking status: Current Every Day Smoker     Packs/day: 1 00     Types: Cigarettes    Smokeless tobacco: Never Used   Vaping Use    Vaping Use: Never used   Substance Use Topics    Alcohol use: Not Currently    Drug use: Never       Review of Systems   Constitutional: Negative for chills, fatigue and fever  HENT: Negative for ear pain and sore throat  Eyes: Negative for pain  Respiratory: Negative for cough, shortness of breath and wheezing  Cardiovascular: Negative for chest pain, palpitations and leg swelling  Gastrointestinal: Negative for abdominal pain, constipation, diarrhea, nausea and vomiting  Endocrine: Negative for polyuria  Genitourinary: Negative for dysuria and pelvic pain  Musculoskeletal: Positive for neck pain  Negative for arthralgias, myalgias and neck stiffness  Skin: Negative for rash  Neurological: Negative for dizziness, syncope, light-headedness and headaches  Psychiatric/Behavioral: Negative for suicidal ideas  The patient is not nervous/anxious      All other systems reviewed and are negative  Physical Exam  Physical Exam  Constitutional:       General: She is not in acute distress  Appearance: Normal appearance  She is well-developed  HENT:      Head: Normocephalic and atraumatic  Right Ear: External ear normal       Left Ear: External ear normal       Mouth/Throat:      Pharynx: No oropharyngeal exudate  Eyes:      Extraocular Movements: Extraocular movements intact  Neck:      Comments: No cervical spine tenderness  Some pain with range of motion of cervical spine in the area the upper trapezius  Palpable muscle spasm and upper trapezius  Tender to the touch  No overlying skin changes  No meningismus  Cardiovascular:      Rate and Rhythm: Normal rate and regular rhythm  Heart sounds: Normal heart sounds  Comments: 2+ bilateral radial pulses  Pulmonary:      Effort: Pulmonary effort is normal       Breath sounds: Normal breath sounds  Abdominal:      General: Bowel sounds are normal       Palpations: Abdomen is soft  Tenderness: There is no abdominal tenderness  Musculoskeletal:         General: Normal range of motion  Cervical back: Normal range of motion and neck supple  Skin:     General: Skin is warm  Capillary Refill: Capillary refill takes less than 2 seconds  Neurological:      General: No focal deficit present  Mental Status: She is alert and oriented to person, place, and time        Comments: Distal sensation and strength normal in equal bilaterally in upper extremities   Psychiatric:         Mood and Affect: Mood normal          Vital Signs  ED Triage Vitals [09/11/22 1630]   Temperature Pulse Respirations Blood Pressure SpO2   98 2 °F (36 8 °C) 103 18 169/99 99 %      Temp Source Heart Rate Source Patient Position - Orthostatic VS BP Location FiO2 (%)   Oral Monitor Sitting Right arm --      Pain Score       10 - Worst Possible Pain           Vitals:    09/11/22 1630   BP: 169/99   Pulse: 103   Patient Position - Orthostatic VS: Sitting         Visual Acuity      ED Medications  Medications   ketorolac (TORADOL) injection 15 mg (15 mg Intramuscular Given 9/11/22 1736)   dexamethasone (DECADRON) injection 8 mg (8 mg Intramuscular Given 9/11/22 1736)   acetaminophen (TYLENOL) tablet 975 mg (975 mg Oral Given 9/11/22 1735)   diazepam (VALIUM) tablet 2 mg (2 mg Oral Given 9/11/22 1736)       Diagnostic Studies  Results Reviewed     None                 No orders to display              Procedures  Procedures         ED Course                               SBIRT 20yo+    Flowsheet Row Most Recent Value   SBIRT (23 yo +)    In order to provide better care to our patients, we are screening all of our patients for alcohol and drug use  Would it be okay to ask you these screening questions? Yes Filed at: 09/11/2022 1731   Initial Alcohol Screen: US AUDIT-C     1  How often do you have a drink containing alcohol? 0 Filed at: 09/11/2022 1731   2  How many drinks containing alcohol do you have on a typical day you are drinking? 0 Filed at: 09/11/2022 1731   3a  Male UNDER 65: How often do you have five or more drinks on one occasion? 0 Filed at: 09/11/2022 1731   3b  FEMALE Any Age, or MALE 65+: How often do you have 4 or more drinks on one occassion? 0 Filed at: 09/11/2022 1731   Audit-C Score 0 Filed at: 09/11/2022 1731   LITA: How many times in the past year have you    Used an illegal drug or used a prescription medication for non-medical reasons? Never Filed at: 09/11/2022 1731                    MDM  Number of Diagnoses or Management Options  Trapezius muscle spasm  Diagnosis management comments: 29-year-old female presented with 1 year of neck pain, significantly worse over the past week  Palpable muscle spasm on exam   Patient had no recent trauma  No weakness  No ripping or tearing sensation    Doubt any traumatic fractures, significant ligamentous injury that would require urgent intervention therefore I do not feel emergent imaging is necessary at this time  Still offered patient imaging however she politely declines stating her primary concern was pain relief  No ripping or tearing sensation  Vitals relatively within normal limits  Patient clinically well-appearing  Doubt dissection  Neuro exam benign  Patient's pain well controlled in the ED  Recommend patient follow-up with her PCP and return to the ER with any significant change or worsening of symptoms  All of her questions were answered and she was agreeable to plan  Portions of the record may have been created with voice recognition software  Occasional wrong word or "sound a like" substitutions may have occurred due to the inherent limitations of voice recognition software  Read the chart carefully and recognize, using context, where substitutions have occurred  Disposition  Final diagnoses:   Trapezius muscle spasm     Time reflects when diagnosis was documented in both MDM as applicable and the Disposition within this note     Time User Action Codes Description Comment    9/11/2022  5:32 PM Stacy Cooper Add [F26 [id number]] Trapezius muscle spasm       ED Disposition     ED Disposition   Discharge    Condition   Stable    Date/Time   Sun Sep 11, 2022  5:32 PM    Yanira HOFFMANN  64  discharge to home/self care                 Follow-up Information     Follow up With Specialties Details Why Contact Info    Cristal Mills PA-C Physician Assistant, Family Medicine  As needed [address]  [address]  [address]  [phone]            Discharge Medication List as of 9/11/2022  5:37 PM      START taking these medications    Details   methocarbamol (ROBAXIN) 500 mg tablet Take 1 tablet (500 mg total) by mouth 2 (two) times a day, Starting Sun 9/11/2022, Normal      naproxen (Naprosyn) 500 mg tablet Take 1 tablet (500 mg total) by mouth 2 (two) times a day with meals, Starting Sun 9/11/2022, Normal         CONTINUE these medications which have NOT CHANGED    Details   albuterol (PROVENTIL HFA,VENTOLIN HFA) 90 mcg/act inhaler inhale 2 puffs by mouth and INTO THE LUNGS every 6 hours if needed for wheezing, Normal      DULoxetine (CYMBALTA) 20 mg capsule take 1 capsule by mouth once daily, Normal      fexofenadine (ALLEGRA) 180 MG tablet Take 1 tablet (180 mg total) by mouth daily, Starting Tue 10/26/2021, Normal      lisinopril-hydrochlorothiazide (PRINZIDE,ZESTORETIC) 10-12 5 MG per tablet take 1 tablet by mouth once daily, Normal      montelukast (SINGULAIR) 10 mg tablet take 1 tablet by mouth at bedtime, Normal      Symbicort 160-4 5 MCG/ACT inhaler inhale 2 puffs by mouth twice a day Rinse mouth after use, Normal      topiramate (Topamax) 25 mg tablet Take 1 tablet (25 mg total) by mouth daily, Starting Thu 2/10/2022, Normal             No discharge procedures on file      PDMP Review     None          ED Provider  Electronically Signed by           Elian Santizo PA-C  09/11/22 2686

## 2022-09-15 ENCOUNTER — OFFICE VISIT (OUTPATIENT)
Dept: FAMILY MEDICINE CLINIC | Facility: HOME HEALTHCARE | Age: 37
End: 2022-09-15
Payer: COMMERCIAL

## 2022-09-15 ENCOUNTER — TELEPHONE (OUTPATIENT)
Dept: PAIN MEDICINE | Facility: CLINIC | Age: 37
End: 2022-09-15

## 2022-09-15 ENCOUNTER — HOSPITAL ENCOUNTER (OUTPATIENT)
Dept: RADIOLOGY | Facility: HOSPITAL | Age: 37
Discharge: HOME | End: 2022-09-15
Payer: COMMERCIAL

## 2022-09-15 VITALS
BODY MASS INDEX: 31.71 KG/M2 | HEART RATE: 86 BPM | TEMPERATURE: 98.1 F | WEIGHT: 179 LBS | OXYGEN SATURATION: 97 % | RESPIRATION RATE: 18 BRPM | HEIGHT: 63 IN | DIASTOLIC BLOOD PRESSURE: 84 MMHG | SYSTOLIC BLOOD PRESSURE: 156 MMHG

## 2022-09-15 DIAGNOSIS — M54.12 CERVICAL RADICULOPATHY: Primary | ICD-10-CM

## 2022-09-15 DIAGNOSIS — I10 PRIMARY HYPERTENSION: ICD-10-CM

## 2022-09-15 DIAGNOSIS — M54.12 CERVICAL RADICULOPATHY: ICD-10-CM

## 2022-09-15 DIAGNOSIS — M25.512 ACUTE PAIN OF LEFT SHOULDER: ICD-10-CM

## 2022-09-15 PROCEDURE — 99213 OFFICE O/P EST LOW 20 MIN: CPT | Performed by: FAMILY MEDICINE

## 2022-09-15 PROCEDURE — 73030 X-RAY EXAM OF SHOULDER: CPT

## 2022-09-15 PROCEDURE — 72050 X-RAY EXAM NECK SPINE 4/5VWS: CPT

## 2022-09-15 PROCEDURE — T1015 CLINIC SERVICE: HCPCS | Performed by: FAMILY MEDICINE

## 2022-09-15 RX ORDER — LISINOPRIL 10 MG/1
10 TABLET ORAL DAILY
Qty: 30 TABLET | Refills: 2 | Status: SHIPPED | OUTPATIENT
Start: 2022-09-15

## 2022-09-15 RX ORDER — METHYLPREDNISOLONE 4 MG/1
TABLET ORAL
Qty: 1 EACH | Refills: 0 | Status: SHIPPED | OUTPATIENT
Start: 2022-09-15

## 2022-09-15 NOTE — TELEPHONE ENCOUNTER
Patient is requesting a transfer of care for the following reason: Moved closer to [address]  Would you release patient from your care? Doctor:Dr Alexis Flores   Would you take patient on as a new patient?       Please advise,   Thank you

## 2022-09-15 NOTE — TELEPHONE ENCOUNTER
Unable to leave message line was busy if she calls back please schedule HUDSON with Dr Harris Less 15 min

## 2022-09-15 NOTE — PROGRESS NOTES
[address]       NAME: Tiffany Homans is a 42 y o  female  : 1985    MRN: [id number]  DATE: September 15, 2022  TIME: 2:25 PM    Assessment and Plan   Diagnoses and all orders for this visit:    Cervical radiculopathy  -     XR spine cervical complete 4 or 5 vw non injury; Future  -     XR shoulder 2+ vw left; Future  -     methylPREDNISolone 4 MG tablet therapy pack; Use as directed on package    Acute pain of left shoulder  -     XR shoulder 2+ vw left; Future    Primary hypertension  -     lisinopril (ZESTRIL) 10 mg tablet; Take 1 tablet (10 mg total) by mouth daily      Patient will reach out to Pain Management to schedule follow-up appointment  Also to discuss that she did not tolerate the Cymbalta which she states causes nausea  X-rays pending will call patient with results  Patient restarted on lisinopril which she has not taken approximately 1 month  Blood pressure elevated  Discussed the importance of patient taking her medications as prescribed  Will recheck blood pressure approximately 3 weeks or sooner if needed patient instructed to call any questions or concerns   Patient will continue the Robaxin and naproxen no was prescribed in emergency department  For her current symptoms  Chief Complaint     Chief Complaint   Patient presents with    Follow-up    Neck Pain     Left sided    Numbness     Left hand, x1 week         History of Present Illness       HPI    49-year-old female with known cervical radiculopathy which she states is chronic however has had acute attack was evaluated emergency department prescribed baclofen and naproxen  Patient was on previous Cymbalta which was ordered by pain management however patient states she discontinued secondary to side effects/nausea  Patient complaining of left-sided neck tenderness with shoulder discomfort as well  Has been worse for approximately 1 week  Denies any trauma or falls    Also states has been having more frequent left hand numbness which is similar to her previous symptoms  Patient states has been doing some lifting which may have aggravated her symptoms  Review of Systems   Review of Systems    As per HPI   all other systems negative   denies any weakness, headaches, dizziness, shortness of breath, chest pain abdominal pain, nausea, vomiting, diarrhea  Denies any loss of bowel or bladder control  Denies any saddle paresthesia     positive crepitus of the shoulder  All other systems negative    Current Medications       Current Outpatient Medications:     albuterol (PROVENTIL HFA,VENTOLIN HFA) 90 mcg/act inhaler, inhale 2 puffs by mouth and INTO THE LUNGS every 6 hours if needed for wheezing, Disp: 18 g, Rfl: 11    DULoxetine (CYMBALTA) 20 mg capsule, take 1 capsule by mouth once daily, Disp: 30 capsule, Rfl: 1    fexofenadine (ALLEGRA) 180 MG tablet, Take 1 tablet (180 mg total) by mouth daily, Disp: 30 tablet, Rfl: 5    lisinopril (ZESTRIL) 10 mg tablet, Take 1 tablet (10 mg total) by mouth daily, Disp: 30 tablet, Rfl: 2    methocarbamol (ROBAXIN) 500 mg tablet, Take 1 tablet (500 mg total) by mouth 2 (two) times a day, Disp: 20 tablet, Rfl: 0    methylPREDNISolone 4 MG tablet therapy pack, Use as directed on package, Disp: 1 each, Rfl: 0    montelukast (SINGULAIR) 10 mg tablet, take 1 tablet by mouth at bedtime, Disp: 30 tablet, Rfl: 5    naproxen (Naprosyn) 500 mg tablet, Take 1 tablet (500 mg total) by mouth 2 (two) times a day with meals, Disp: 30 tablet, Rfl: 0    Symbicort 160-4 5 MCG/ACT inhaler, inhale 2 puffs by mouth twice a day Rinse mouth after use, Disp: 10 2 g, Rfl: 5    topiramate (Topamax) 25 mg tablet, Take 1 tablet (25 mg total) by mouth daily, Disp: 30 tablet, Rfl: 5    Current Allergies     Allergies as of 09/15/2022    (No Known Allergies)            The following portions of the patient's history were reviewed and updated as appropriate: allergies, current medications, past family history, past medical history, past social history, past surgical history and problem list      Past Medical History:   Diagnosis Date    Asthma     Hypertension        Past Surgical History:   Procedure Laterality Date     SECTION      TUBAL LIGATION         History reviewed  No pertinent family history  Medications have been verified  Objective   /84 (BP Location: Left arm, Patient Position: Sitting, Cuff Size: Adult)   Pulse 86   Temp 98 1 °F (36 7 °C) (Temporal)   Resp 18   Ht 5' 3" (1 6 m)   Wt 81 2 kg (179 lb)   LMP 2022 (Approximate)   SpO2 97%   BMI 31 71 kg/m²        Physical Exam     Physical Exam  Vitals reviewed  Constitutional:       General: She is not in acute distress  Appearance: She is not ill-appearing, toxic-appearing or diaphoretic  HENT:      Head: Normocephalic  Cardiovascular:      Rate and Rhythm: Normal rate and regular rhythm  Heart sounds: Normal heart sounds  Pulmonary:      Effort: Pulmonary effort is normal       Breath sounds: Normal breath sounds  Abdominal:      Tenderness: There is no abdominal tenderness  Musculoskeletal:      Left shoulder: Tenderness and crepitus present  No swelling, deformity, effusion, laceration or bony tenderness  Normal range of motion  Normal strength  Normal pulse  Arms:       Cervical back: No swelling, edema, deformity, erythema, signs of trauma, lacerations, rigidity, spasms, torticollis, tenderness, bony tenderness or crepitus  No pain with movement  Normal range of motion  Skin:     Capillary Refill: Capillary refill takes less than 2 seconds  Neurological:      General: No focal deficit present  Mental Status: She is alert and oriented to person, place, and time  left arm peripheral vascular intact  Currently no loss of sensation or weakness  Positive for range of motion brisk capillary refill

## 2022-09-19 NOTE — TELEPHONE ENCOUNTER
S/w pt, she is transferring to  at the end of October, pt is asking if RM will place an order for her to get a cervical MRI in the mean time  Pt said she was recently in the ED and they did a xray and are recommending a MRI for f/u based on results

## 2022-09-19 NOTE — TELEPHONE ENCOUNTER
Pt called in to schedule a HUDSON that was approved by both doctors   Pt has been scheduled with Dr Cristiane Chand on 10/31/2022 @ 8:30 am  Pt was referred to have an MRI done can Dr THERESE jimenez and order      Please be advised   Pt can be reached @[phone]

## 2022-09-21 ENCOUNTER — EVALUATION (OUTPATIENT)
Dept: PHYSICAL THERAPY | Facility: CLINIC | Age: 37
End: 2022-09-21
Payer: COMMERCIAL

## 2022-09-21 DIAGNOSIS — M54.12 CERVICAL RADICULOPATHY: Primary | ICD-10-CM

## 2022-09-21 PROCEDURE — 97110 THERAPEUTIC EXERCISES: CPT | Performed by: PHYSICAL THERAPIST

## 2022-09-21 PROCEDURE — 97162 PT EVAL MOD COMPLEX 30 MIN: CPT | Performed by: PHYSICAL THERAPIST

## 2022-09-21 NOTE — PROGRESS NOTES
PT Evaluation     Today's date: 22  Patient name: Rajendra Ahmadi  : 1985  MRN: [id number]  Referring provider: Jeni Lacey MD  Dx:   Encounter Diagnosis     ICD-10-CM    1  Chronic bilateral low back pain with bilateral sciatica  M54 42     M54 41     G89 29    2  Chronic cervical pain  M54 2     G89 29                   Assessment:    [place] is a 42 y o  female referred  to physical therapy for cervical radiculopathy  She presents with increased pain, decreased  strength, decreased ROM and functional deficits  She would benefit from skilled physical therapy for manual therapy, ROM, strengthening, traction and modalities  She has been given an hep and is in agreement with the plan of care  Thank you for your referral       Assessment details: Pt Becca Chun is a 43 y o  who presents to OPPT with s/s consistent with chronic lumbar and cervical spine pain  Pt presents with limited L/S and C/S mobility and core strength deficits, decreased B UE/LE ROM and strength, impaired soft tissue mobility/limited flexibility, decreased postural awareness, and gait/balance dysfunctions  Pt with limitations with prolonged ambulation, difficulty with stair negotiation, disrupted sleep patterns, and difficulty with lifting/carrying  She also notes intermittent N/T into B UE/B LE; symptoms worse into B LE with prolonged sitting and worse into B UE with any UE sustained positioning shoulder heigh to OH  Pt has become more sedentary due to limitations and has difficulty completing daily activities at home due to increased pain  Pt would benefit from skilled therapy services to address outlined impairments, work towards goals, and restore pts PLOF  Thank you!    Impairments: abnormal gait, abnormal or restricted ROM, abnormal movement, activity intolerance, impaired balance, impaired physical strength, lacks appropriate home exercise program, pain with function, weight-bearing intolerance, poor posture and poor body mechanics    Goals  STGs to be achieved in 4 weeks:  -Pt to demonstrate reduced subjective pain rating "at worst" by at least 2-3 points from Initial Eval to allow for reduced pain with ADLs and improved functional activity tolerance    -Pt to demonstrate ROM improved to WNL in order to maximize joint mobility and function and allow for progression of exercise program and achievement of goals    -Pt to demonstrate increased MMT of BUE/BLE by at least 1/2 grade in order to improve safety and stability with ADLs and functional mobility  LTGs to be achieved upon discharge:   -Pt will be I with HEP in order to continue to improve quality of life and independence and reduce risk for re-injury    -Pt to demonstrate return to activities of daily living without limiations or restrictions    -Pt will return to sitting tolerance > 1 hour without radicular symptoms to help facilitate return to work activities independently   -Pt to demonstrate return to lifting/carrying > 5# without radicular symptoms to return to daily household chores   -Pt to demonstrate improved function as noted by achieving or exceeding predicted score on FOTO outcomes assessment tool            Plan  Plan details: PT provided pt with detailed HEP program; pt verbalized understanding of program    Patient would benefit from: skilled physical therapy  Planned modality interventions: cryotherapy and thermotherapy: hydrocollator packs  Planned therapy interventions: abdominal trunk stabilization, balance, manual therapy, neuromuscular re-education, patient education, postural training, therapeutic activities, therapeutic exercise, home exercise program, flexibility and functional ROM exercises  Frequency: 2x week  Duration in weeks: 4  Plan of Care beginning date:9/21/22  Plan of Care expiration date: 12/21/22  Treatment plan discussed with: patient        Subjective Evaluation    History of Present Illness  Mechanism of injury: Pt reporting that she has been having back and neck pain for 10+ years  She notes that she previously lived in [place] and "they wouldn't do anything to treat it with her insurance"  She recently moved back to [place] and is now starting to see new doctors re: her pain  She just recently began with pain management who is referring to OPPT for conservative management of s/s  MD wants MRI but insurance has denied until she completes therapy  She will return to MD again in 6 weeks  22:  Patient returns to physical therapy after lifting something heavy at work and she injured her neck again  She went to the ER and they said she pinched a nerve  She states that she began having radicular sx when he attended physical therapy in 2022  She sleeps with a heating pad since the new episode of pain  She states that she cannot get comfortable when she is sleeping  She cannot sleep on the L side due to pain  She was given a steroid medrol dose pack which she finished yesterday  She is now taking tylenol and advil  She notes no relief with the steroid  CC:  She reports pain in the shoulder which radiates up into the neck and down into her forearm  She notes numbness in her thumb and 3-4th digit  She reports that she drops things out of her hand  She indicates that those sx are new since the new injury  She is RHD  She works at [employer]  She cleans things up around the business  She sometimes has to lift heavier things  She also does computer billing as well            Quality of life: good    Pain  At best pain ratin  /      7/10  At worst pain ratin  /    10/10  Quality: tight  Relieving factors: change in position  Aggravating factors: sitting, lifting, overhead activity and stair climbing    Social Support  Stairs in house: yes     Employment status: working (sitting majority of the day )    Diagnostic Tests  No diagnostic tests performed  Treatments  Previous treatment: physical therapy and medication  Current treatment: physical therapy  Patient Goals  Patient goals for therapy: decreased pain, increased motion, increased strength, independence with ADLs/IADLs and return to sport/leisure activities          Objective     Postural Observations  Seated posture: fair  Standing posture: fair    Palpation:  L TTP  L UT/Mid trap        Neurological Testing     Sensation   Cervical/Thoracic   Left   Intact: light touch  Paresthesia: light touch    Right   Intact: light touch  Paresthesia: light touch    Lumbar   Left   Intact: light touch  Paresthesia: light touch    Right   Intact: light touch  Paresthesia: light touch    Active Range of Motion   Cervical/Thoracic Spine       Cervical                                                                  9/21/22    Flexion: 45 degrees  with pain                                    30  p                                       Extension: 35 degrees                                                70  p    with pain  Left lateral flexion: 35 degrees                                    45 p    with pain  Right lateral flexion: 35 degrees                                 45 p    with pain  Left rotation: 45 degrees with pain                              58 p  Right rotation: 45 degrees                                           53p    with pain    Mechanical Assessment    Cervical    Strength/Myotome Testing     Left Shoulder                                   9/21/22    Planes of Motion   Flexion: 4                                              5/5  Abduction: 4-                                        5/5    Right Shoulder     Planes of Motion   Flexion: 4                                              5/5  Abduction: 4-                                        5/5     Strength                                       R:  40 kg     L:  18 kg    Tests   Cervical   Positive vertical compression and neck flexor muscle endurance test         Precautions: na      Manuals 9/21            iastm L trap             (-) pressure iastm                                       Neuro Re-Ed                          SBS                                                                              Ther Ex                          [place] nv            UT st hep            Supine chin tuck hep            [employer] nv                                                   Ther Activity                                       Gait Training                                       Modalities             Traction   trial

## 2022-09-26 ENCOUNTER — HOSPITAL ENCOUNTER (OUTPATIENT)
Dept: RADIOLOGY | Facility: HOSPITAL | Age: 37
Discharge: HOME | End: 2022-09-26
Attending: ANESTHESIOLOGY
Payer: COMMERCIAL

## 2022-09-26 ENCOUNTER — OFFICE VISIT (OUTPATIENT)
Dept: PHYSICAL THERAPY | Facility: CLINIC | Age: 37
End: 2022-09-26
Payer: COMMERCIAL

## 2022-09-26 DIAGNOSIS — M54.12 CERVICAL RADICULOPATHY: Primary | ICD-10-CM

## 2022-09-26 DIAGNOSIS — G89.29 CHRONIC BILATERAL LOW BACK PAIN, UNSPECIFIED WHETHER SCIATICA PRESENT: ICD-10-CM

## 2022-09-26 DIAGNOSIS — M54.50 CHRONIC BILATERAL LOW BACK PAIN, UNSPECIFIED WHETHER SCIATICA PRESENT: ICD-10-CM

## 2022-09-26 DIAGNOSIS — M54.16 LUMBAR RADICULOPATHY: ICD-10-CM

## 2022-09-26 PROCEDURE — 72110 X-RAY EXAM L-2 SPINE 4/>VWS: CPT

## 2022-09-26 PROCEDURE — 97012 MECHANICAL TRACTION THERAPY: CPT

## 2022-09-26 PROCEDURE — 97140 MANUAL THERAPY 1/> REGIONS: CPT

## 2022-09-26 NOTE — PROGRESS NOTES
Daily Note     Today's date: 2022  Patient name: Bishop Rutherford  : 1985  MRN: [id number]  Referring provider: FLAVIO Wolff  Dx: No diagnosis found  Subjective: L sided shoulder/neck pain to begin treatment  B/l numbness in upper extremities  Objective: See treatment diary below      Assessment: Tolerated treatment well  Patient exhibited good technique with therapeutic exercises      Plan: Continue per plan of care        Precautions: na      Manuals            iastm L trap  5           (-) pressure iastm  6                                     Neuro Re-Ed                          SBS                                                                              Ther Ex                          [place] 3           UT st hep With (-) IASTM           Supine chin tuck hep            Lev Scap [facility] nv 10" x 3                                                  Ther Activity                                       Gait Training                                       Modalities             Traction   17# 3 step 12 min

## 2022-09-28 ENCOUNTER — OFFICE VISIT (OUTPATIENT)
Dept: PHYSICAL THERAPY | Facility: CLINIC | Age: 37
End: 2022-09-28
Payer: COMMERCIAL

## 2022-09-28 DIAGNOSIS — M54.12 CERVICAL RADICULOPATHY: Primary | ICD-10-CM

## 2022-09-28 PROCEDURE — 97012 MECHANICAL TRACTION THERAPY: CPT | Performed by: PHYSICAL THERAPIST

## 2022-09-28 PROCEDURE — 97140 MANUAL THERAPY 1/> REGIONS: CPT | Performed by: PHYSICAL THERAPIST

## 2022-09-28 PROCEDURE — 97112 NEUROMUSCULAR REEDUCATION: CPT | Performed by: PHYSICAL THERAPIST

## 2022-09-28 PROCEDURE — 97110 THERAPEUTIC EXERCISES: CPT | Performed by: PHYSICAL THERAPIST

## 2022-09-28 NOTE — PROGRESS NOTES
Daily Note     Today's date: 2022  Patient name: Zeke Szymanski  : 1985  MRN: [id number]  Referring provider: FLAVIO Max  Dx: No diagnosis found  Subjective: Patient reports that she has soreness in the L UT      Objective: See treatment diary below      Assessment: Tolerated treatment well  Patient would benefit from continued PT      Plan: Continue per plan of care        Precautions: na      Manuals           iastm L trap  5           (-) pressure iastm  6                                     Neuro Re-Ed                          SBS   10 x :05                                                                           Ther Ex                          [place] 3 3'          UT st hep With (-) IASTM 5 x :20          Supine chin tuck hep  5 x :20          Lev Scap St nv 10" x 3 5 x :20                                                 Ther Activity                                       Gait Training                                       Modalities             Traction   17# 3 step 12 min  17# 3 step 12 min

## 2022-10-03 ENCOUNTER — OFFICE VISIT (OUTPATIENT)
Dept: PHYSICAL THERAPY | Facility: CLINIC | Age: 37
End: 2022-10-03
Payer: COMMERCIAL

## 2022-10-03 DIAGNOSIS — M54.12 CERVICAL RADICULOPATHY: Primary | ICD-10-CM

## 2022-10-03 PROCEDURE — 97012 MECHANICAL TRACTION THERAPY: CPT | Performed by: PHYSICAL THERAPIST

## 2022-10-03 PROCEDURE — 97140 MANUAL THERAPY 1/> REGIONS: CPT | Performed by: PHYSICAL THERAPIST

## 2022-10-03 PROCEDURE — 97110 THERAPEUTIC EXERCISES: CPT | Performed by: PHYSICAL THERAPIST

## 2022-10-03 NOTE — PROGRESS NOTES
Daily Note     Today's date: 10/3/2022  Patient name: Kylah Schroeder  : 1985  MRN: [id number]  Referring provider: FLAVIO Quintana  Dx:   Encounter Diagnosis     ICD-10-CM    1  Cervical radiculopathy  M54 12                   Subjective: Patient reports that she is beginning to feel improvement  Objective: See treatment diary below      Assessment: Tolerated treatment well  Patient would benefit from continued PT      Plan: Continue per plan of care        Precautions: na      Manuals 9/21 9/26 9/28 10/3         iastm L trap  5           (-) pressure iastm  6           PROM   8 8                      Neuro Re-Ed                          SBS   10 x :05                                                 TB row   GTB  GTB x 20         Ext    GTB x 20         Ther Ex                          [place] 3 3' 5'         UT st hep With (-) IASTM 5 x :20 5 x :20         Supine chin tuck hep  5 x :20 5 x :20         Lev Scap St nv 10" x 3 5 x :20 5 x :20                                                Ther Activity                                       Gait Training                                       Modalities             Traction   17# 3 step 12 min  17# 3 step 12 min 18# x 12'

## 2022-10-05 ENCOUNTER — APPOINTMENT (OUTPATIENT)
Dept: PHYSICAL THERAPY | Facility: CLINIC | Age: 37
End: 2022-10-05

## 2022-10-11 ENCOUNTER — TELEPHONE (OUTPATIENT)
Dept: RADIOLOGY | Facility: MEDICAL CENTER | Age: 37
End: 2022-10-11

## 2022-10-11 NOTE — TELEPHONE ENCOUNTER
Per Malou Andrade "Patient will probably need to reschedule her MRI's further out  She's on for this Friday and that won't be enough time to get an approval if I'm resubmitting for auth "     Pt made aware of the same

## 2022-10-11 NOTE — TELEPHONE ENCOUNTER
S/w pt, advised per RM's notation she would need to complete PT  Pt states she has her last appt tomorrow (10/12)

## 2022-10-11 NOTE — TELEPHONE ENCOUNTER
Oswald Andrade  [facility]  For both MRI Cervical Spine and MRI Lumbar Spine:     The prior authorization request for this study has not been approved  You can schedule a peer to peer by calling [facility] [phone] with the deadline date of 10/14  The insurance determined the following:     [ ] Does not meet Medical Necessity   [ ] No prior imaging   [ x] PT or conservative treatment incomplete (I uploaded all PT notes in patient's chart)   [ ] María Cohen   [ ] Frequency     We reviewed the medical information given by your doctor  Your doctor's records say you have neck pain  With what was received from your doctor, a decision was made that neck pain is not a reason for a(n) Cervical Spine MRI/Lumbar Spine MRI  To approve, we need doctor's notes that say you did six weeks of neck exercises (physical therapy, chiropractic treatments, or medically directed home exercise program) in the last six months  We also need to know the number of visits you went to  If you did this, the notes do not show the dates that you did the exercises  It is our medical view that the Cervical Spine MRI/Lumbar Spine MRI be denied as it is not medically necessary  If you choose not to complete a peer to peer then please reply to this message to let us know  Please notify your patient and contact central scheduling by calling [phone] to cancel the appointment and cancel the order in Epic

## 2022-10-11 NOTE — TELEPHONE ENCOUNTER
Caller: patient    Doctor: Sourav Cornejo    Reason for call: regarding MRI authorization    Call back#: [phone]

## 2022-10-12 ENCOUNTER — OFFICE VISIT (OUTPATIENT)
Dept: PHYSICAL THERAPY | Facility: CLINIC | Age: 37
End: 2022-10-12
Payer: COMMERCIAL

## 2022-10-12 DIAGNOSIS — M54.12 CERVICAL RADICULOPATHY: Primary | ICD-10-CM

## 2022-10-12 PROCEDURE — 97012 MECHANICAL TRACTION THERAPY: CPT

## 2022-10-12 PROCEDURE — 97110 THERAPEUTIC EXERCISES: CPT

## 2022-10-12 NOTE — PROGRESS NOTES
Daily Note     Today's date: 10/12/2022  Patient name: Geovanna Medina  : 1985  MRN: [id number]  Referring provider: FLAVIO Castle  Dx: No diagnosis found  Subjective: Patient states that she continues to experience b/l UE numbness  States that she has a difficult time holding items and feels her  strength is weak in L hand     Objective: See treatment diary below      Assessment: Tolerated treatment well  Patient exhibited good technique with therapeutic exercises      Plan: Continue per plan of care        Precautions: na      Manuals 9/21 9/26 9/28 10/3 10/12        iastm L trap  5           (-) pressure iastm  6           PROM   8 8 8                     Neuro Re-Ed                          SBS   10 x :05                                                 TB row   GTB  GTB x 20 GTB x 20         Ext    GTB x 20 GTB x 20         Ther Ex                          [place] 3 3' 5' 5        UT st hep With (-) IASTM 5 x :20 5 x :20 5 x 20"         Supine chin tuck hep  5 x :20 5 x :20         Lev Scap St nv 10" x 3 5 x :20 5 x :20                                                Ther Activity                                       Gait Training                                       Modalities             Traction   17# 3 step 12 min  17# 3 step 12 min 18# x 12' 18# 12 min

## 2022-10-31 ENCOUNTER — OFFICE VISIT (OUTPATIENT)
Dept: PAIN MEDICINE | Facility: CLINIC | Age: 37
End: 2022-10-31

## 2022-10-31 VITALS
SYSTOLIC BLOOD PRESSURE: 159 MMHG | BODY MASS INDEX: 32.42 KG/M2 | DIASTOLIC BLOOD PRESSURE: 98 MMHG | WEIGHT: 183 LBS | HEART RATE: 93 BPM

## 2022-10-31 DIAGNOSIS — M50.120 CERVICAL DISC DISORDER WITH RADICULOPATHY OF MID-CERVICAL REGION: ICD-10-CM

## 2022-10-31 DIAGNOSIS — M51.16 INTERVERTEBRAL DISC DISORDER WITH RADICULOPATHY OF LUMBAR REGION: Primary | ICD-10-CM

## 2022-10-31 RX ORDER — DICLOFENAC SODIUM 75 MG/1
75 TABLET, DELAYED RELEASE ORAL 2 TIMES DAILY
Qty: 60 TABLET | Refills: 2 | Status: SHIPPED | OUTPATIENT
Start: 2022-10-31

## 2022-10-31 NOTE — PATIENT INSTRUCTIONS
Core Strengthening Exercises   AMBULATORY CARE:   What you need to know about core strengthening exercises: Your core includes the muscles of your lower back, hip, pelvis, and abdomen  Core strengthening exercises help heal and strengthen these muscles  This helps prevent another injury, and keeps your pelvis, spine, and hips in the correct position  Contact your healthcare provider if:   You have sharp or worsening pain during exercise or at rest     You have questions or concerns about your shoulder exercises  Safety tips:  Talk to your healthcare provider before you start an exercise program  A physical therapist can teach you how to do core strengthening exercises safely  Do the exercises on a mat or firm surface  A firm surface will support your spine and prevent low back pain  Do not do these exercises on a bed  Move slowly and smoothly  Avoid fast or jerky motions  Stop if you feel pain  Core exercises should not be painful  Stop if you feel pain  Breathe normally during core exercises  Do not hold your breath  This may cause an increase in blood pressure and prevent muscle strengthening  Your healthcare provider will tell you when to inhale and exhale during the exercise  Begin all of your exercises with abdominal bracing  Abdominal bracing helps warm up your core muscles  You can also practice abdominal bracing throughout the day  Lie on your back with your knees bent and feet flat on the floor  Place your arms in a relaxed position beside your body  Tighten your abdominal muscles  Pull your belly button in and up toward your spine  Hold for 5 seconds  Relax your muscles  Repeat 10 times  Core strengthening exercises: Your healthcare provider will tell you how often to do these exercises  The provider will also tell you how many repetitions of each exercise you should do  Hold each exercise for 5 seconds or as directed   As you get stronger, increase your hold to 10 to 15 seconds  You can do some of these exercises on a stability ball, or with a weight  Ask your healthcare provider how to use a stability ball or weight for these exercises:  Bridging:  Lie on your back with your knees bent and feet flat on the floor  Rest your arms at your side  Tighten your buttocks, and then lift your hips 1 inch off the floor  Hold for 5 seconds  When you can do this exercise without pain for 10 seconds, increase the distance you lift your hips  A good goal is to be able to lift your hips so that your shoulders, hips, and knees are in a straight line  Dead bug:  Lie on your back with your knees bent and feet flat on the floor  Place your arms in a relaxed position beside your body  Begin with abdominal bracing  Next, raise one leg, keeping your knee bent  Hold for 5 seconds  Repeat with the other leg  When you can do this exercise without pain for 10 to 15 seconds, you may raise one straight leg and hold  Repeat with the other leg  Quadruped:  Place your hands and knees on the floor  Keep your wrists directly below your shoulders and your knees directly below your hips  Pull your belly button in toward your spine  Do not flatten or arch your back  Tighten your abdominal muscles below your belly button  Hold for 5 seconds  When you can do this exercise without pain for 10 to 15 seconds, you may extend one arm and hold  Repeat on the other side  Side bridge exercises:      Standing side bridge:  Stand next to a wall and extend one arm toward the wall  Place your palm flat on the wall with your fingers pointing upward  Begin with abdominal bracing  Next, without moving your feet, slowly bend your arm to 90 degrees  Hold for 5 seconds  Repeat on the other side  When you can do this exercise without pain for 10 to 15 seconds, you may do the bent leg side bridge on the floor  Bent leg side bridge:  Lie on one side with your legs, hips, and shoulders in a straight line  Prop yourself up onto your forearm so your elbow is directly below your shoulder  Bend your knees back to 90 degrees  Begin with abdominal bracing  Next, lift your hips and balance yourself on your forearm and knees  Hold for 5 seconds  Repeat on the other side  When you can do this exercise without pain for 10 to 15 seconds, you may do the straight leg side bridge on the floor  Straight leg side bridge:  Lie on one side with your legs, hips, and shoulders in a straight line  Prop yourself up onto your forearm so your elbow is directly below your shoulder  Begin with abdominal bracing  Lift your hips off the floor and balance yourself on your forearm and the outside of your flexed foot  Do not let your ankle bend sideways  Hold for 5 seconds  Repeat on the other side  When you can do this exercise without pain for 10 to 15 seconds, ask your healthcare provider for more advanced exercises  Superman:  Lie on your stomach  Extend your arms forward on the floor  Tighten your abdominal muscles and lift your right hand and left leg off the floor  Hold this position  Slowly return to the starting position  Tighten your abdominal muscles and lift your left hand and right leg off the floor  Hold this position  Slowly return to the starting position  Clam:  Lie on your side with your knees bent  Put your bottom arm under your head to keep your neck in line  Put your top hand on your hip to keep your pelvis from moving  Put your heels together, and keep them together during this exercise  Slowly raise your top knee toward the ceiling  Then lower your leg so your knees are together  Repeat this exercise 10 times  Then switch sides and do the exercise 10 times with the other leg  Curl up:  Lie on your back with your knees bent and feet flat on the floor  Place your hands, palms down, underneath your lower back   Next, with your elbows on the floor, lift your shoulders and chest 2 to 3 inches off the floor  Keep your head in line with your shoulders  Hold this position  Slowly return to the starting position  Straight leg raises:  Lie on your back with one leg straight  Bend the other knee and place your foot flat on the floor  Tighten your abdominal muscles  Keep your leg straight and slowly lift it straight up 6 to 12 inches off the floor  Hold this position  Lower your leg slowly  Do as many repetitions as directed on this side  Repeat with the other leg  Plank:  Lie on your stomach  Bend your elbows and place your forearms flat on the floor  Lift your chest, stomach, and knees off the floor  Make sure your elbows are below your shoulders  Your body should be in a straight line  Do not let your hips or lower back sink to the ground  Squeeze your abdominal muscles together and hold for 15 seconds  To make this exercise harder, hold for 30 seconds or lift 1 leg at a time  Bicycles:  Lie on your back  Bend both knees and bring them toward your chest  Your calves should be parallel to the floor  Place the palms of your hands on the back of your head  Straighten your right leg and keep it lifted 2 inches off the floor  Raise your head and shoulders off the floor and twist towards your left  Keep your head and shoulders lifted  Bend your right knee while you straighten your left leg  Keep your left leg 2 inches off the floor  Twist your head and chest towards the left leg  Continue to straighten 1 leg at a time and twist        Follow up with your doctor as directed:  Write down your questions so you remember to ask them during your visits  © Copyright [employer] 2022 Information is for End User's use only and may not be sold, redistributed or otherwise used for commercial purposes  All illustrations and images included in CareNotes® are the copyrighted property of [employer]  or Betsey [employer]   The above information is an  only   It is not intended as medical advice for individual conditions or treatments  Talk to your doctor, nurse or pharmacist before following any medical regimen to see if it is safe and effective for you  Neck Exercises   WHAT YOU NEED TO KNOW:   Why is it important to do neck exercises? Neck exercises help reduce neck pain, and improve neck movement and strength  Neck exercises also help prevent long-term neck problems  What do I need to know about neck exercises? Do the exercises every day,  or as often as directed by your healthcare provider  Move slowly, gently, and smoothly  Avoid fast or jerky motions  Stand and sit the way your healthcare provider shows you  Good posture may reduce your neck pain  Check your posture often, even when you are not doing your neck exercises  How do I perform neck exercises safely? Exercise position:  You may sit or stand while you do neck exercises  Face forward  Your shoulders should be straight and relaxed, with a good posture  Head tilts, forward and back:  Gently bow your head and try to touch your chin to your chest  Your healthcare provider may tell you to push on the back of your neck to help bow your head  Raise your chin back to the starting position  Tilt your head back as far as possible so you are looking up at the ceiling  Your healthcare provider may tell you to lift your chin to help tilt your head back  Return your head to the starting position  Head tilts, side to side:  Tilt your head, bringing your ear toward your shoulder  Then tilt your head toward the other shoulder  Head turns:  Turn your head to look over your shoulder  Tilt your chin down and try to touch it to your shoulder  Do not raise your shoulder to your chin  Face forward again  Do the same on the other side  Head rolls:  Slowly bring your chin toward your chest  Next, roll your head to the right  Your ear should be positioned over your shoulder  Hold this position for 5 seconds  Roll your head back toward your chest and to the left into the same position  Hold for 5 seconds  Gently roll your head back and around in a clockwise [place] 3 times  Next, move your head in the reverse direction (counterclockwise) in a [place] 3 times  Do not shrug your shoulders upwards while you do this exercise  When should I contact my healthcare provider? Your pain does not get better, or gets worse  You have questions or concerns about your condition, care, or exercise program     CARE AGREEMENT:   You have the right to help plan your care  Learn about your health condition and how it may be treated  Discuss treatment options with your healthcare providers to decide what care you want to receive  You always have the right to refuse treatment  The above information is an  only  It is not intended as medical advice for individual conditions or treatments  Talk to your doctor, nurse or pharmacist before following any medical regimen to see if it is safe and effective for you  © Copyright [employer] 2022 Information is for End User's use only and may not be sold, redistributed or otherwise used for commercial purposes   All illustrations and images included in CareNotes® are the copyrighted property of A D A M , Inc  or [address] [place] [address] [employer]

## 2022-10-31 NOTE — PROGRESS NOTES
Assessment  1  Intervertebral disc disorder with radiculopathy of lumbar region    2  Cervical disc disorder with radiculopathy of mid-cervical region        Plan  The patient's symptoms, history/physical are consistent with pain that is multifactorial in origin but predominantly the result of widespread myofascial pain as well as cervical and lumbar disc degeneration which is leading to radicular symptoms  At this time, given ongoing pain symptoms despite physical therapy which aggravated her condition, I will order an MRI of the lumbar spine and cervical spine to evaluate further  I advised we will call the results and discuss treatment moving forward  For now, I will switch her anti-inflammatory to diclofenac 75 mg twice daily for better efficacy  She was advised to take with a meal to decrease stomach irritation  Complete risks and benefits including bleeding, infection, tissue reaction, nerve injury and allergic reaction were discussed  The approach was demonstrated using models and literature was provided  Verbal and written consent was obtained  My impressions and treatment recommendations were discussed in detail with the patient who verbalized understanding and had no further questions  Discharge instructions were provided  I personally saw and examined the patient and I agree with the above discussed plan of care  Orders Placed This Encounter   Procedures   • MRI cervical spine without contrast     Standing Status:   Future     Standing Expiration Date:   10/31/2026     Scheduling Instructions: There is no preparation for this test  Please leave your jewelry and valuables at home, wedding rings are the exception  All patients will be required to change into a hospital gown and pants  Street clothes are not permitted in the MRI    Magnetic nail polish must be removed prior to arrival for your test  Please bring your insurance cards, a form of photo ID and a list of your medications with you  Arrive 15 minutes prior to your appointment time in order to register  Please bring any prior CT or MRI studies of this area that were not performed at a [facility] facility  To schedule this appointment, please contact Central Scheduling at [phone]  Prior to your appointment, please make sure you complete the MRI Screening Form when you e-Check in for your appointment  This will be available starting 7 days before your appointment in [facility]  You may receive an e-mail with an activation code if you do not have a [place] account  If you do not have access to a device, we will complete your screening at your appointment  Order Specific Question:   What is the patient's sedation requirement? Answer:   No Sedation     Order Specific Question:   Is the patient pregnant? Answer:   Unknown     Order Specific Question:   Release to patient through [employer]     Answer:   Immediate     Order Specific Question:   Is order priority selected as STAT? Answer:   No     Order Specific Question:   Reason for Exam (FREE TEXT)     Answer:   neck pain into arms   • MRI lumbar spine without contrast     Standing Status:   Future     Standing Expiration Date:   10/31/2026     Scheduling Instructions: There is no preparation for this test  Please leave your jewelry and valuables at home, wedding rings are the exception  All patients will be required to change into a hospital gown and pants  Street clothes are not permitted in the MRI  Magnetic nail polish must be removed prior to arrival for your test  Please bring your insurance cards, a form of photo ID and a list of your medications with you  Arrive 15 minutes prior to your appointment time in order to register  Please bring any prior CT or MRI studies of this area that were not performed at a [facility] facility  To schedule this appointment, please contact Central Scheduling at [phone]              Prior to your appointment, please make sure you complete the MRI Screening Form when you e-Check in for your appointment  This will be available starting 7 days before your appointment in [address]  You may receive an e-mail with an activation code if you do not have a [employer] account  If you do not have access to a device, we will complete your screening at your appointment  Order Specific Question:   What is the patient's sedation requirement? Answer:   No Sedation     Order Specific Question:   Is the patient pregnant? Answer:   Unknown     Order Specific Question:   Release to patient through [employer]     Answer:   Immediate     Order Specific Question:   Is order priority selected as STAT? Answer:   No     Order Specific Question:   Reason for Exam (FREE TEXT)     Answer:   lbp into legs     New Medications Ordered This Visit   Medications   • diclofenac (VOLTAREN) 75 mg EC tablet     Sig: Take 1 tablet (75 mg total) by mouth 2 (two) times a day     Dispense:  60 tablet     Refill:  2       History of Present Illness    Yuni Lees is a 42 y o  female presents with chronic neck and back pain that has been present for 10 years  She was previously seeing Dr Christine Trevino and care is now being transferred to me  Symptoms are moderate to severe rated 7/10 on a numeric rating scale and felt constantly  She has pain in her neck and shoulders, low back and legs that is sharp, shooting dull/aching throbbing with numbness and paresthesias  She feels weakness down the right leg  Symptoms are aggravated with lying down, standing, bending, sitting, walking, coughing, sneezing  Treatment history has included physical therapy which provided minimal relief and she had to stop because it was aggravating her  Exercise on her own provides no relief  She uses ibuprofen 600 to 800 mg 3 to 4 times a day which provides some relief      I have personally reviewed and/or updated the patient's past medical history, past surgical history, family history, social history, current medications, allergies, and vital signs today  Review of Systems   Constitutional: Negative for fever and unexpected weight change  HENT: Negative for trouble swallowing  Eyes: Negative for visual disturbance  Respiratory: Negative for shortness of breath and wheezing  Cardiovascular: Negative for chest pain and palpitations  Gastrointestinal: Negative for constipation, diarrhea, nausea and vomiting  Endocrine: Negative for cold intolerance, heat intolerance and polydipsia  Genitourinary: Negative for difficulty urinating and frequency  Musculoskeletal: Positive for back pain, gait problem, joint swelling and neck pain  Negative for arthralgias and myalgias  Skin: Negative for rash  Neurological: Positive for numbness  Negative for dizziness, seizures, syncope, weakness and headaches  Hematological: Does not bruise/bleed easily  Psychiatric/Behavioral: Negative for dysphoric mood  All other systems reviewed and are negative  Patient Active Problem List   Diagnosis   • Hypertension   • Asthma   • Cervical radiculopathy   • Chronic bilateral low back pain   • DDD (degenerative disc disease), lumbar   • Binge eating disorder   • BMI 33 0-33 9,adult   • Pre-diabetes   • Cigarette nicotine dependence without complication       Past Medical History:   Diagnosis Date   • Asthma    • Hypertension        Past Surgical History:   Procedure Laterality Date   •  SECTION     • TUBAL LIGATION         No family history on file      Social History     Occupational History   • Not on file   Tobacco Use   • Smoking status: Current Every Day Smoker     Packs/day: 1 00     Types: Cigarettes   • Smokeless tobacco: Never Used   Vaping Use   • Vaping Use: Never used   Substance and Sexual Activity   • Alcohol use: Not Currently   • Drug use: Never   • Sexual activity: Yes     Partners: Male     Birth control/protection: Female Sterilization       Current Outpatient Medications on File Prior to Visit   Medication Sig   • albuterol (PROVENTIL HFA,VENTOLIN HFA) 90 mcg/act inhaler inhale 2 puffs by mouth and INTO THE LUNGS every 6 hours if needed for wheezing   • fexofenadine (ALLEGRA) 180 MG tablet Take 1 tablet (180 mg total) by mouth daily   • lisinopril (ZESTRIL) 10 mg tablet Take 1 tablet (10 mg total) by mouth daily   • montelukast (SINGULAIR) 10 mg tablet take 1 tablet by mouth at bedtime   • Symbicort 160-4 5 MCG/ACT inhaler inhale 2 puffs by mouth twice a day Rinse mouth after use   • [DISCONTINUED] DULoxetine (CYMBALTA) 20 mg capsule take 1 capsule by mouth once daily   • [DISCONTINUED] methocarbamol (ROBAXIN) 500 mg tablet Take 1 tablet (500 mg total) by mouth 2 (two) times a day   • [DISCONTINUED] methylPREDNISolone 4 MG tablet therapy pack Use as directed on package   • [DISCONTINUED] naproxen (Naprosyn) 500 mg tablet Take 1 tablet (500 mg total) by mouth 2 (two) times a day with meals   • [DISCONTINUED] topiramate (Topamax) 25 mg tablet Take 1 tablet (25 mg total) by mouth daily     No current facility-administered medications on file prior to visit  No Known Allergies    Physical Exam    /98   Pulse 93   Wt 83 kg (183 lb)   BMI 32 42 kg/m²     Constitutional: normal, well developed, well nourished, alert, in no distress and non-toxic and no overt pain behavior   and obese  Eyes: anicteric  HEENT: grossly intact  Neck: supple, symmetric, trachea midline and no masses   Pulmonary:even and unlabored  Cardiovascular:No edema or pitting edema present  Skin:Normal without rashes or lesions and well hydrated  Psychiatric:Mood and affect appropriate  Neurologic:Cranial Nerves II-XII grossly intact  Musculoskeletal:normal     Cervical Spine Exam  Appearance:  Normal lordosis  Palpation/Tenderness:  left cervical paraspinal tenderness  right cervical paraspinal tenderness  Range of Motion:  Flexion:  Minimally limited  with pain  Extension:  Minimally limited  with pain  Rotation - Left:  Minimally limited  with pain  Rotation - Right:  Minimally limited  with pain  Motor Strength:  Left Arm Flexion  5/5  Left Arm Extension  5/5  Right Arm Flexion  5/5  Right Arm Extension  5/5  Left Wrist Flexion  5/5  Left Wrist Extension  5/5  Left Finger Abduction  5/5  Right Finger Abduction  5/5  Left Pincer Grasp  5/5  Right Pincer Grasp  5/5  Reflexes:  Left Biceps:  2+   Right Biceps:  2+   Left Triceps:  2+   Right Triceps:  2+     Lumbar Spine Exam  Appearance:  Normal lordosis  Palpation/Tenderness:  left lumbar paraspinal tenderness  right lumbar paraspinal tenderness  Range of Motion:  Flexion:  Minimally limited  with pain  Extension:  Minimally limited  with pain  Motor Strength:  Left hip flexion:  5/5  Left hip extension:  5/5  Right hip flexion:  5/5  Right hip extension:  5/5  Left knee flexion:  5/5  Left knee extension:  5/5  Right knee flexion:  5/5  Right knee extension:  5/5  Left foot dorsiflexion:  5/5  Left foot plantar flexion:  5/5  Right foot dorsiflexion:  5/5  Right foot plantar flexion:  5/5  Reflexes:  Left Biceps:  1+   Right Biceps:  1+   Left Triceps:  1+   Right Triceps:  1+     Imaging    XR LUMBAR SPINE (9/26/2022)     INDICATION:   M54 50: Low back pain, unspecified  G89 29: Other chronic pain  M54 16: Radiculopathy, lumbar region      COMPARISON:  November 19, 2021      VIEWS:  XR SPINE LUMBAR MINIMUM 4 VIEWS NON INJURY        FINDINGS:     There are 5 non rib bearing lumbar vertebral bodies       There is no evidence of acute fracture or destructive osseous lesion      Alignment is unremarkable       Mild disc space narrowing with endplate sclerosis and facet arthropathy at L5-S1      The pedicles appear intact      Soft tissues are unremarkable  XR CERVICAL SPINE (9/15/2022)     INDICATION:   M54 12:  Radiculopathy, cervical region      COMPARISON:  Cervical spine x-ray dated January 3, 2022      VIEWS: XR SPINE CERVICAL COMPLETE 4 OR 5 VW NON INJURY

## 2022-11-08 ENCOUNTER — OFFICE VISIT (OUTPATIENT)
Dept: FAMILY MEDICINE CLINIC | Facility: HOME HEALTHCARE | Age: 37
End: 2022-11-08

## 2022-11-08 ENCOUNTER — TELEPHONE (OUTPATIENT)
Dept: FAMILY MEDICINE CLINIC | Facility: HOME HEALTHCARE | Age: 37
End: 2022-11-08

## 2022-11-08 DIAGNOSIS — I10 PRIMARY HYPERTENSION: Primary | ICD-10-CM

## 2022-11-08 DIAGNOSIS — F17.210 CONTINUOUS DEPENDENCE ON CIGARETTE SMOKING: ICD-10-CM

## 2022-11-08 RX ORDER — AMLODIPINE BESYLATE 5 MG/1
10 TABLET ORAL DAILY
Qty: 30 TABLET | Refills: 2 | Status: SHIPPED | OUTPATIENT
Start: 2022-11-08

## 2022-11-08 NOTE — PROGRESS NOTES
[address]       NAME: Sheri Hawk is a 42 y o  female  : 1985    MRN: [id number]  DATE: 2022  TIME: 7:58 AM    Assessment and Plan   Diagnoses and all orders for this visit:    Primary hypertension  -     amLODIPine (NORVASC) 5 mg tablet; Take 2 tablets (10 mg total) by mouth daily    Continuous dependence on cigarette smoking        Annual physical not completed today secondary to hypertension issues  Patient's blood pressure is still elevated  Patient discontinued her lisinopril stating that she has adverse reactions with lisinopril and hydrochlorothiazide in the past   States it causes her to have urinary issues and dysuria which resolve with discontinuation of medication  Discussed with patient that these are not typical adverse reactions to these medications over patient would like to try another class of medication  Amlodipine 10 mg once daily started  Patient will follow-up in approximately 2-3 weeks for blood pressure check and can perform annual physical at that time  Instructed to call me with any questions or concerns  Chief Complaint     Chief Complaint   Patient presents with   • Annual Exam         History of Present Illness       HPI  64-year-old female here today for office visit  Patient has been having elevated blood pressures with essential hypertension  Has not tolerated hydrochlorothiazide and was recently started on lisinopril  Patient discontinued both medications due to having urinary symptoms/dysuria while on these medications  Patient states once she stops these medicines the symptoms dissipated  Will try another class of medication today  Will start patient on amlodipine 10 mg daily  Patient will return in 2-3 weeks for blood pressure check and re-evaluation  Review of Systems   Review of Systems   occasional headaches respond to ibuprofen      Denies fevers, chills, dizziness, weakness, chest pain, shortness of breath, abdominal pain, GI symptoms, lower extremity edema  All other systems negative    Current Medications       Current Outpatient Medications:   •  albuterol (PROVENTIL HFA,VENTOLIN HFA) 90 mcg/act inhaler, inhale 2 puffs by mouth and INTO THE LUNGS every 6 hours if needed for wheezing, Disp: 18 g, Rfl: 11  •  amLODIPine (NORVASC) 5 mg tablet, Take 2 tablets (10 mg total) by mouth daily, Disp: 30 tablet, Rfl: 2  •  diclofenac (VOLTAREN) 75 mg EC tablet, Take 1 tablet (75 mg total) by mouth 2 (two) times a day, Disp: 60 tablet, Rfl: 2  •  fexofenadine (ALLEGRA) 180 MG tablet, Take 1 tablet (180 mg total) by mouth daily, Disp: 30 tablet, Rfl: 5  •  montelukast (SINGULAIR) 10 mg tablet, take 1 tablet by mouth at bedtime, Disp: 30 tablet, Rfl: 5  •  Symbicort 160-4 5 MCG/ACT inhaler, inhale 2 puffs by mouth twice a day Rinse mouth after use, Disp: 10 2 g, Rfl: 5    Current Allergies     Allergies as of 2022   • (No Known Allergies)            The following portions of the patient's history were reviewed and updated as appropriate: allergies, current medications, past family history, past medical history, past social history, past surgical history and problem list      Past Medical History:   Diagnosis Date   • Asthma    • Hypertension        Past Surgical History:   Procedure Laterality Date   •  SECTION     • TUBAL LIGATION         History reviewed  No pertinent family history  Medications have been verified  Objective   /94 (BP Location: Left arm, Patient Position: Sitting, Cuff Size: Adult)   Pulse 90   Temp 98 1 °F (36 7 °C) (Temporal)   Resp 18   Ht 5' 3" (1 6 m)   Wt 84 4 kg (186 lb)   SpO2 96%   BMI 32 95 kg/m²        Physical Exam     Physical Exam  Vitals and nursing note reviewed  Constitutional:       General: She is not in acute distress  Appearance: She is not ill-appearing, toxic-appearing or diaphoretic  HENT:      Head: Normocephalic     Cardiovascular:      Rate and Rhythm: Normal rate and regular rhythm  Heart sounds: Normal heart sounds  Pulmonary:      Effort: Pulmonary effort is normal       Breath sounds: Normal breath sounds  Abdominal:      General: Bowel sounds are normal       Palpations: Abdomen is soft  Tenderness: There is no abdominal tenderness  Musculoskeletal:      Right lower leg: No edema  Left lower leg: No edema  Skin:     Findings: No rash  Neurological:      General: No focal deficit present  Mental Status: She is alert and oriented to person, place, and time  Motor: No weakness     Psychiatric:         Mood and Affect: Mood normal

## 2022-11-09 VITALS
SYSTOLIC BLOOD PRESSURE: 150 MMHG | DIASTOLIC BLOOD PRESSURE: 94 MMHG | TEMPERATURE: 98.1 F | WEIGHT: 186 LBS | HEIGHT: 63 IN | HEART RATE: 90 BPM | BODY MASS INDEX: 32.96 KG/M2 | OXYGEN SATURATION: 96 % | RESPIRATION RATE: 18 BRPM

## 2022-11-17 ENCOUNTER — TELEPHONE (OUTPATIENT)
Dept: RADIOLOGY | Facility: CLINIC | Age: 37
End: 2022-11-17

## 2022-11-17 NOTE — TELEPHONE ENCOUNTER
S/w pt, advised of the same  States she completed PT in March for both neck and back  Then completed 2 additional weeks of PT for neck just recently  RN advised typically insurance requires 6 weeks of PT within the last 6 months  Sent to Michael Wang to confirm if recent neck PT was submitted with auth as well

## 2022-11-17 NOTE — TELEPHONE ENCOUNTER
Please review update from MRI auth team and advise regarding next step  Further information also available in 10/11 phone task  Cervical and lumbar MRI scheduled for 11/28      ----------------------------------------------------    [facility]  Cc: Thomas Schneider  The prior authorization request for this study has been denied on 10/07/2022 under case # [id number]  Dr Kelvin Oconnor  / [facility] - was the ordering physician  Appeal process will need to need to done through that office  Your request was denied completely because: We reviewed the medical information given by your doctor  Your doctor's records say you have neck pain  With what was received from your doctor, a decision was made that neck pain is not a reason for a(n) Cervical Spine MRI  To approve, we need doctor's notes that say you did six weeks of neck exercises (physical therapy, chiropractic treatments, or medically directed home exercise program) in the last six months  We also need to know the number of visits you went to  If you did this, the notes do not show the dates that you did the exercises  It is our medical view that the Cervical Spine MRI be denied as it is not medically necessary  JULIEN Clinical Guideline 040 for Cervical Spine MRI was used in this request     Please notify your patient of insurance denial and follow up with the patient for next steps in their treatment plan  Notify central scheduling by calling [phone] to cancel appointment and close this order in Epic       Thank you   [facility]   [phone]

## 2022-11-17 NOTE — TELEPHONE ENCOUNTER
Please let patient know that her insurance is denying the MRI    She needs to complete the PT for the neck

## 2022-11-17 NOTE — TELEPHONE ENCOUNTER
S/w Vikram Antony via phone  States cervical MRI is pending at this time, she will be checking daily and provide update as further information becomes available  [facility] lumbar MRI was denied on 10/7  States the ordering doctor would need to do an appeal if lumbar MRI is still needed  Lumbar MRI was previously ordered by RM; however, pt has since transferred care to [address] [place] [address] FQ would not be able to do the appeal since he was not the ordering doctor

## 2022-11-28 ENCOUNTER — APPOINTMENT (OUTPATIENT)
Dept: RADIOLOGY | Age: 37
End: 2022-11-28

## 2022-11-28 ENCOUNTER — HOSPITAL ENCOUNTER (OUTPATIENT)
Dept: RADIOLOGY | Age: 37
Discharge: HOME | End: 2022-11-28

## 2022-11-28 DIAGNOSIS — M50.120 CERVICAL DISC DISORDER WITH RADICULOPATHY OF MID-CERVICAL REGION: ICD-10-CM

## 2022-11-29 ENCOUNTER — TELEPHONE (OUTPATIENT)
Dept: RADIOLOGY | Facility: CLINIC | Age: 37
End: 2022-11-29

## 2022-11-29 NOTE — TELEPHONE ENCOUNTER
S/w pt, states diclofenac doesn't really help her pain  Last ov 10/31 pt says Luisito Deras was mentioned as another option if diclofenac wont work  Pt's pain is on her back, neck, legs, ankle, knees and all over 5/10  MRI  of cervical done 11/29 no result yet  Pls advise

## 2022-11-29 NOTE — TELEPHONE ENCOUNTER
S/w pt, says her MRI of lumbar [address] [address] is denied  As per pt, PT for her back is completed for 6 weeks, pt don't understand why it got denied  Pls advise

## 2022-11-29 NOTE — TELEPHONE ENCOUNTER
----- Message from Maren Giron sent at 11/29/2022  8:30 AM EST -----  Regarding: Medication   Contact: [phone]  At my visit you told me 2 wait 2 weeks then request the other medication for the fibromyalga im not sure if its in my chart or not but the medication you prescribed to my isnt working  can you please call in the other medication thank you

## 2022-11-29 NOTE — TELEPHONE ENCOUNTER
Fazal Hwang, JULIET  Cc: Fazal Amador  Good morning,     The MRI Cervical -Spine, has been approved Carter Matos #[id number]  The MRI - Lumbar Spine, was denied  under Dr Rad Green back in October/2022  The appeal process needs to obtained by this office  Thank you   [address] Prior Authorization [facility]   [phone]     Pls advise about this Tamy Tate  Thank you

## 2022-12-02 NOTE — TELEPHONE ENCOUNTER
Dr Gabriel Moise; are you doing the appeal for the lumbar MRI? It would need to be done by the provider that originally ordered it  Dr Vandana Perez: the cervical MRI results are available  Any recommendations for pt from prior message from pt that the diclofenac isn't helping

## 2022-12-07 ENCOUNTER — OFFICE VISIT (OUTPATIENT)
Dept: FAMILY MEDICINE CLINIC | Facility: CLINIC | Age: 37
End: 2022-12-07

## 2022-12-07 VITALS
SYSTOLIC BLOOD PRESSURE: 120 MMHG | WEIGHT: 184.2 LBS | HEART RATE: 101 BPM | TEMPERATURE: 98.1 F | OXYGEN SATURATION: 94 % | RESPIRATION RATE: 20 BRPM | BODY MASS INDEX: 32.64 KG/M2 | HEIGHT: 63 IN | DIASTOLIC BLOOD PRESSURE: 80 MMHG

## 2022-12-07 DIAGNOSIS — N63.12 MASS OF UPPER INNER QUADRANT OF RIGHT BREAST: Primary | ICD-10-CM

## 2022-12-07 NOTE — PROGRESS NOTES
Assessment/Plan      Follow up on USG and mammogram  once resulted  Diagnoses and all orders for this visit:    Mass of upper inner quadrant of right breast  Comments:  Tender hard mass 7x 4cm noted on right breast medially since 2 weeks  No nipple changes  Family h/o cervical cancer present  Ordered Mammogram and USG breast    Orders:  -     US breast right limited (diagnostic); Future  -     Mammo diagnostic bilateral w cad; Future        Subjective     Patient Id: Flaquita Muller is a 42 y o  female    Presents to the clinic with c/o painful mass in her right breast since 2 weeks  Pt states that her son had bumped into her right breast few weeks ago  She initially neglected the pain assuming that it was from that event  But she states that she has continued to experience pain and noticed a hard lump which was not present previously  She has difficulty sleeping on the side of her right breast  She grades the pain 4/10  She denies any change in nipple appearance or any discharge from the nipple  She states that she has not noticed any change in the size  No skin changes reported  She reports regular period  She states that there is a family h/o cervical cancer in sister and mother  No other symptoms  Normal  PAP 2021  Review of Systems   Constitutional: Negative for chills and fever  HENT: Negative for ear pain and sore throat  Eyes: Negative for pain and visual disturbance  Respiratory: Negative for cough and shortness of breath  Cardiovascular: Negative for chest pain and palpitations  Gastrointestinal: Negative for abdominal pain and vomiting  Genitourinary: Negative for dysuria and hematuria  Musculoskeletal: Negative for arthralgias and back pain  Skin: Negative for color change and rash  Neurological: Negative for seizures and syncope  All other systems reviewed and are negative        Past Medical History:   Diagnosis Date   • Asthma    • Hypertension        Past Surgical History:   Procedure Laterality Date   •  SECTION     • TUBAL LIGATION         History reviewed  No pertinent family history  No Known Allergies      Current Outpatient Medications:   •  albuterol (PROVENTIL HFA,VENTOLIN HFA) 90 mcg/act inhaler, inhale 2 puffs by mouth and INTO THE LUNGS every 6 hours if needed for wheezing, Disp: 18 g, Rfl: 11  •  amLODIPine (NORVASC) 5 mg tablet, Take 2 tablets (10 mg total) by mouth daily, Disp: 30 tablet, Rfl: 2  •  diclofenac (VOLTAREN) 75 mg EC tablet, Take 1 tablet (75 mg total) by mouth 2 (two) times a day, Disp: 60 tablet, Rfl: 2  •  fexofenadine (ALLEGRA) 180 MG tablet, Take 1 tablet (180 mg total) by mouth daily, Disp: 30 tablet, Rfl: 5  •  montelukast (SINGULAIR) 10 mg tablet, take 1 tablet by mouth at bedtime, Disp: 30 tablet, Rfl: 5  •  Symbicort 160-4 5 MCG/ACT inhaler, inhale 2 puffs by mouth twice a day Rinse mouth after use, Disp: 10 2 g, Rfl: 5    Objective     Vitals:    22 1030   BP: 120/80   BP Location: Left arm   Patient Position: Sitting   Cuff Size: Large   Pulse: 101   Resp: 20   Temp: 98 1 °F (36 7 °C)   SpO2: 94%   Weight: 83 6 kg (184 lb 3 2 oz)   Height: 5' 3" (1 6 m)       Physical Exam  Vitals and nursing note reviewed  Constitutional:       General: She is not in acute distress  Appearance: She is well-developed  HENT:      Head: Normocephalic and atraumatic  Eyes:      Conjunctiva/sclera: Conjunctivae normal    Cardiovascular:      Rate and Rhythm: Normal rate and regular rhythm  Heart sounds: No murmur heard  Pulmonary:      Effort: Pulmonary effort is normal  No respiratory distress  Breath sounds: Normal breath sounds  Chest:   Breasts:     Breasts are symmetrical       Right: Mass (7 x 4 cm, oval shaped hard mass medial to the right nipple ) and tenderness present  No bleeding, inverted nipple, nipple discharge or skin change  Left: Normal        Abdominal:      Palpations: Abdomen is soft  Tenderness: There is no abdominal tenderness  Musculoskeletal:         General: No swelling  Cervical back: Neck supple  Lymphadenopathy:      Cervical: No cervical adenopathy  Upper Body:      Right upper body: No supraclavicular or axillary adenopathy  Left upper body: No supraclavicular or axillary adenopathy  Skin:     General: Skin is warm and dry  Capillary Refill: Capillary refill takes less than 2 seconds  Neurological:      Mental Status: She is alert     Psychiatric:         Mood and Affect: Mood normal          Eran Farris MD  [address]

## 2022-12-15 ENCOUNTER — HOSPITAL ENCOUNTER (OUTPATIENT)
Dept: ULTRASOUND IMAGING | Facility: CLINIC | Age: 37
Discharge: HOME | End: 2022-12-15

## 2022-12-15 ENCOUNTER — TELEPHONE (OUTPATIENT)
Dept: FAMILY MEDICINE CLINIC | Facility: CLINIC | Age: 37
End: 2022-12-15

## 2022-12-15 ENCOUNTER — HOSPITAL ENCOUNTER (OUTPATIENT)
Dept: MAMMOGRAPHY | Facility: CLINIC | Age: 37
Discharge: HOME | End: 2022-12-15

## 2022-12-15 VITALS — WEIGHT: 182.98 LBS | BODY MASS INDEX: 32.42 KG/M2 | HEIGHT: 63 IN

## 2022-12-15 DIAGNOSIS — N63.12 MASS OF UPPER INNER QUADRANT OF RIGHT BREAST: ICD-10-CM

## 2022-12-15 NOTE — TELEPHONE ENCOUNTER
Informed Pt about her breast USG results  She is aware about the breast mass being highly suspicious for malignancy  She is scheduled for a USG guided biopsy 12/22/22

## 2022-12-15 NOTE — PROGRESS NOTES
Met with patient and Dr Sotero Flores                  regarding recommendation for;      __x___ RIGHT ______LEFT      __x( 3 sites, 2 breast, 1 LN)___Ultrasound guided  ______Stereotactic  Breast biopsy  __x___Verbalized understanding        Blood thinners:  _____yes __x___no    Date stopped: _____n/a______    Biopsy teaching sheet given:  ____x___yes ______no

## 2022-12-22 ENCOUNTER — HOSPITAL ENCOUNTER (OUTPATIENT)
Dept: ULTRASOUND IMAGING | Facility: CLINIC | Age: 37
End: 2022-12-22

## 2022-12-22 ENCOUNTER — HOSPITAL ENCOUNTER (OUTPATIENT)
Dept: MAMMOGRAPHY | Facility: CLINIC | Age: 37
Discharge: HOME | End: 2022-12-22

## 2022-12-22 VITALS — HEART RATE: 81 BPM | SYSTOLIC BLOOD PRESSURE: 147 MMHG | DIASTOLIC BLOOD PRESSURE: 93 MMHG

## 2022-12-22 DIAGNOSIS — R92.8 ABNORMAL ULTRASOUND OF BREAST: ICD-10-CM

## 2022-12-22 DIAGNOSIS — N63.10 MASS OF RIGHT BREAST, UNSPECIFIED QUADRANT: ICD-10-CM

## 2022-12-22 DIAGNOSIS — R92.8 ABNORMAL MAMMOGRAM: ICD-10-CM

## 2022-12-22 RX ORDER — LIDOCAINE HYDROCHLORIDE 10 MG/ML
5 INJECTION, SOLUTION EPIDURAL; INFILTRATION; INTRACAUDAL; PERINEURAL ONCE
Status: COMPLETED | OUTPATIENT
Start: 2022-12-22 | End: 2022-12-22

## 2022-12-22 RX ADMIN — LIDOCAINE HYDROCHLORIDE 5 ML: 10 INJECTION, SOLUTION EPIDURAL; INFILTRATION; INTRACAUDAL; PERINEURAL at 08:58

## 2022-12-22 RX ADMIN — LIDOCAINE HYDROCHLORIDE 5 ML: 10 INJECTION, SOLUTION EPIDURAL; INFILTRATION; INTRACAUDAL; PERINEURAL at 09:08

## 2022-12-22 RX ADMIN — LIDOCAINE HYDROCHLORIDE 5 ML: 10 INJECTION, SOLUTION EPIDURAL; INFILTRATION; INTRACAUDAL; PERINEURAL at 08:38

## 2022-12-22 NOTE — PROGRESS NOTES
Procedure type:    __x___ultrasound guided _____stereotactic    Breast:    _____Left __x___Right    Location: 2 o'clock 6 cmfn    Needle: 14 gauge ora    # of passes: 4     Clip: ALEXEY      Performed by: Dr Tiffany Liu held for 5 minutes by: Nilesh Santiago     Sterphan Strips:    ___x__yes _____no    Band aid:    ___x__yes_____no    Tape and guaze:    _____yes __x___no    Tolerated procedure:    ___x__yes _____no

## 2022-12-22 NOTE — PROGRESS NOTES
Procedure type:    __x___ultrasound guided _____stereotactic    Breast:    _____Left ___x__Right    Location: 4 o'clock 6 cmfn    Needle: 14 gauge ora    # of passes: 4    Clip: ALEXEY     Performed by: Dr Jaison Gonzalez held for 5 minutes by: Shruthi Ramirez     Sterphan Strips:    ___x__yes _____no    Band aid:    ___x__yes_____no    Tape and guaze:    _____yes __x___no    Tolerated procedure:    ___x__yes _____no

## 2022-12-22 NOTE — PROGRESS NOTES
Procedure type:    __x___ultrasound guided _____stereotactic    Breast:    _____Left __x___Right    Location: axilla    Needle: 14 gauge ora    # of passes: 4    Clip: ALEXEY     Performed by: Dr Cynthia Tovar held for 5 minutes by: Omar Pacheco Strips:    __x___yes _____no    Band aid:    ___x__yes_____no    Tape and guaze:    _____yes __x___no    Tolerated procedure:    ___x__yes _____no

## 2022-12-22 NOTE — DISCHARGE INSTR - OTHER ORDERS
POST ALEXEY  PATIENT INFORMATION      Place an ice pack inside your bra over the top of the dressing every hour for 20 minutes (20 minutes on, 60 minutes off)  Do this until bedtime  Tonight you may remove the bandaid  If steri-strips were used, tomorrow, you may bathe your breast carefully with the steri-strips in place  Be careful not to loosen them  The steri-strips will fall off in 3-5 days  You may have mild discomfort, and you may have some bruising where the needle entered the skin  This should clear within 5-7 days  If you need medicine for discomfort, take acetaminophen products such as Tylenol  You may also take Advil or Motrin products  DO NOT use Aspirin for the first 24 hours  Watch for continued bleeding, pain or fever over 101  If any of these symptoms occur, please contact our breast nurse navigator at the location where your biopsy was performed  During normal business hours (7:30am - 4:00pm) please call the nurse navigator at the site     where your procedure was performed:       [facility] [address]: [phone] or      [facility] Floor: [phone] or [phone]     [address] Guo [address]: [address] [facility]: [address] [facility] [address]: [phone]        After 4pm - please call your physician or go to the nearest Emergency Department location

## 2022-12-23 NOTE — PROGRESS NOTES
Post procedure call completed    Bleeding: _____yes __X___no (pt denies)    Pain: _____yes ___X___no (pt denies, using ice, no need for OTC meds)    Redness/Swelling: ______yes ___X___no (pt with minor swelling)    Band aid removed: _____yes __X___no (discussed removing when she showers)    Steri-Strips intact: ___X___yes _____no (discussed with patient to remove steri strips on Tuesday if they have not come off on their own)    Pt with no questions at this time, adv will call when results available, adv to call with any questions or concerns, has name/# for contact

## 2022-12-27 ENCOUNTER — TELEPHONE (OUTPATIENT)
Dept: GENETICS | Facility: CLINIC | Age: 37
End: 2022-12-27

## 2022-12-27 ENCOUNTER — TELEPHONE (OUTPATIENT)
Dept: MAMMOGRAPHY | Facility: CLINIC | Age: 37
End: 2022-12-27

## 2022-12-27 ENCOUNTER — DOCUMENTATION (OUTPATIENT)
Dept: HEMATOLOGY ONCOLOGY | Facility: CLINIC | Age: 37
End: 2022-12-27

## 2022-12-27 ENCOUNTER — TELEPHONE (OUTPATIENT)
Dept: HEMATOLOGY ONCOLOGY | Facility: CLINIC | Age: 37
End: 2022-12-27

## 2022-12-27 NOTE — PROGRESS NOTES
Intake received, chart reviewed for need of external records  Pathology completed:Yes, completed on 12-  Imaging completed:Yes, diagnostic Mammogram & US done on 12/15/2022   All records needed are in patients chart  No further action required of Care Coordination team at this time

## 2022-12-27 NOTE — TELEPHONE ENCOUNTER
I introduced myself to [place] and let her know that her nurse navigator reached out to the cancer risk and genetics program on her behalf  I reviewed the following with [place]:    • While the majority of cancer occurs by chance, approximately 5-10% of breast cancer has an underlying genetic cause  Genetic testing is available which can determine if there is an underlying genetic cause to your cancer  Understanding if there is an underlying genetic cause can:  o Provide your surgeon with additional information to help with surgical decisions, treatment decisions and eligibility for clinical trials  o It can determine if you have an increased risk for any additional cancers  o Help family members understand their cancer risk  • We work closely with the [place] [address] breast surgeons and are reaching out to see if you have interest in genetic testing  The reason we are reaching out at this time is that this result may help your surgeon determine the appropriate type of surgery (i e  lumpectomy vs mastectomy)  This test is not a requirement but can take 5-10 days to complete so we would like to start the process as soon as possible so the results are ready for your appointment with your surgeon  • If you are interested in genetic testing, I can collect your family history and initiate genetic testing for you  •   Patient elected to pursue testing     • Diagnosis Details:  o Invasive Ductal Carcinoma  o Right  o Hormone receptors pending  • Personal History:  o Do you have a personal history of any other cancer? No  - If yes type/age of diagnosis:   • Family history:   o Do you have Ashkenazi Oriental orthodox ancestry? No  - If yes, maternal, paternal, or both?  o Do you have any children? Yes  - How many sons? 2  - How many daughters? 2  - Do any of your children have a history of cancer? No  - If yes type/age of diagnosis:     o Do you have any brothers or sisters? Yes  - How many brothers?  1  - How many sisters? 1  - Are they from the same parents? Yes  - If no how maternal/paternal half-siblings:  - Do any of your brothers or sisters have a history of cancer? Yes  - If yes who and the type/age of diagnosis:   Sister - Ovarian Cancer age 32          Do you have nieces or nephews? Yes  - Do any of them have a history of cancer? No  - If yes type/age of diagnosis:    o Does your mother have a history of cancer? Yes  - If yes, cancer type and age of diagnosis: Lung Cancer age 61 ; Ovarian Cancer age 28  - Is your mother still living? Yes  - Age/Age of death: 61    o Thinking about your mother's family (aunts, uncles, cousins, grandparents) is there anyone with a history of cancer? Yes  - If yes, list relationship, cancer type and age of diagnosis:  MGF - Colon Cancer age 46 ()  MGM - Skin Cancer age unknown age ()  Maternal Great Aunt - Breast Cancer age unknown ()    o Does your father have a history of cancer? No  - If yes, cancer type and age of diagnosis:  - Is your father still living? Yes  - Age/age of death: 61    o Thinking about your father's family (aunts, uncles, cousins, grandparents) is there anyone with a history of cancer? No  - If yes, list relationship, cancer type and age of diagnosis:      • Types of Results- positive, negative, VUS  o Positive result- may explain personal diagnosis/family history  Can give surgeon information on treatment plan, inform future screening/management or tell a person about other possible risks  Positive results can initiate testing for other family members who may be at risk (children, siblings, etc)  o Negative result- does not give an explanation  Surgical/treatment plan will be based on clinical presentation and will be part of discussion with surgeon  Negative result cannot be passed down to children, but they are still at elevated risk  o Uncertain result- common, but treated like a negative result clinically   90% are downgraded over time      • AccuRev policy   o Most insurance plans cover the cost of genetic testing  The out-of-pocket cost varies due to the differences in deductibles, co-payments and co-insurance defined by individual plans but 90% of people pay $100 or less for a genetic test     A blood kit will be mailed to you overnight  Please take the blood kit along with packet of paperwork to any [facility] lab to have your blood drawn  We have genetic counselors available, if you have any additional questions or would like to speak with them we can schedule you a 15 minute appointment  Plan:  A blood kit was mailed out on 12/27/2022 along with information on genetic testing and the lab's billing policy  [facility] (8 genes): FRANKO, BRCA1, BRCA2, CDH1, CHEK2, PALB2, PTEN, TP53 with reflex to [facility] CancerNext (28 additional genes): APC, FRANKO, AXIN2 BARD1, BRCA1, BRCA2, BRIP1, BMPR1A, CDH1, CDK4, CDKN2A, CHEK2, DICER1, EPCAM, GREM1, HOXB13, MLH1, MSH2, MSH3, MSH6, MUTYH, NBN, NF1, NTHL1, PALB2, PMS2, POLD1, POLE, PTEN, RAD51C, RAD51D, RECQL SMAD4, SMARCA4, STK11, TP53    Initial results will take approximately 5-12 days to return     Additional results may take up to 2-3 weeks to complete once test is started  Patient does not have any further questions, declined meeting with genetic counselor    When your results are ready, someone from the genetics team will call you, review the results, and contact your breast surgeon  You will be contacted with any type of result- positive, negative, or uncertain

## 2022-12-27 NOTE — TELEPHONE ENCOUNTER
[address] [facility] Newly Diagnosed Genetics Checklist    Please route all intake forms to 'oncology genetics breast' pool in [facility]  This includes patients that decline testing  Patient is under 60 at the time of diagnosis discuss genetics (script below)    Script for Genetics:  Approximately 5-10% of cancer can have an underlying genetic cause  Genetic testing is recommended for women diagnosed with breast cancer under the age of 61  Your breast surgeon recommends that you have genetic testing to help determine your surgical plan  Our genetics team will call you in 24-48 hours to discuss this test and schedule a blood draw  The Genetics team will be able to address your questions  Outcome:    Patient is under 60:  Yes    Referral Outcome: Patient is interested in genetic testing, a referral was placed to the oncology genetics testing program

## 2022-12-27 NOTE — TELEPHONE ENCOUNTER
Call placed to patient after pt given biopsy results from radiologist, Dr Luz Moreno, questions answered, support given, adv next step is to set patient up with surgeon, options discussed (Dr Gabe Ashby, Dr Niki Snyder and Dr Jun Covarrubias) and pt would like to have appt made with Dr Gabe Ashby, adv patient that  would call her back by tomorrow with appt, pt states understanding, pt with no questions at this time, has name/# for any further needs    Also discussed with patient about genetic testing, pt has not had this done and is interested in completing, adv that someone from genetic counseling will reach out to her to discuss, pt states understanding

## 2022-12-27 NOTE — TELEPHONE ENCOUNTER
[id number] [facility] Surgical Oncology Referral       Diagnosis: Right breast invasive ductal x2 sites and lymph node mets       Is this diagnosis cancer (Y/N): Yes       Biopsy Date: 12/22/22       Does the patient have another biopsy pending: No   If so, when:       Preferred provider: Dr Rosalinda Fletcher       Preferred location: [facility]       Any requests for dates/times: Appointment scheduled for 12/29       Any additional information: N/A       Please advise when appointment is made

## 2022-12-27 NOTE — TELEPHONE ENCOUNTER
[facility] Referral    Diagnosis: Right breast invasive ductal x2 sites and lymph node mets    Is this diagnosis cancer (Y/N): Yes    Biopsy Date: 12/22/22    Does the patient have another biopsy pending: No  If so, when:    Preferred provider: Dr Татьяна Harley    Preferred location: [facility]    Any requests for dates/times: ASAP    Any additional information: N/A    Please advise when appointment is made

## 2022-12-28 ENCOUNTER — PATIENT OUTREACH (OUTPATIENT)
Dept: HEMATOLOGY ONCOLOGY | Facility: CLINIC | Age: 37
End: 2022-12-28

## 2022-12-28 ENCOUNTER — APPOINTMENT (OUTPATIENT)
Dept: LAB | Facility: MEDICAL CENTER | Age: 37
End: 2022-12-28

## 2022-12-28 DIAGNOSIS — C50.919 MALIGNANT NEOPLASM OF FEMALE BREAST, UNSPECIFIED ESTROGEN RECEPTOR STATUS, UNSPECIFIED LATERALITY, UNSPECIFIED SITE OF BREAST (HCC): ICD-10-CM

## 2022-12-28 DIAGNOSIS — Z80.3 FAMILY HISTORY OF BREAST CANCER: ICD-10-CM

## 2022-12-28 DIAGNOSIS — Z80.0 FAMILY HISTORY OF MALIGNANT NEOPLASM OF GASTROINTESTINAL TRACT: ICD-10-CM

## 2022-12-28 DIAGNOSIS — Z80.41 FAMILY HISTORY OF MALIGNANT NEOPLASM OF OVARY: ICD-10-CM

## 2022-12-28 NOTE — PROGRESS NOTES
Breast Oncology Nurse Navigator      Called patient for initial outreach from nurse navigator  Introduced myself and my role  Education provided regarding diagnosis and treatment options  Pt has no further questions at this time  Discussed the biopsy results and also the meaning of the positive lymph node results  Pt had spent some time on [employer], so discussed much of it in depth  Explained that the receptors are not yet resulted and the significance of those results  Discussed the multidisciplinary approach to cancer care, expectations for the appointment tomorrow with Dr Thomas Reyes  Patient lives at home with significant other and 2 teenage children  Obviously patient is anxious about telling the kids however is aware of the need to do so  Support network is strong  Social work referral will be placed  Discussed genetics, provided education in regards to the advantages of having genetic testing completed  Pts family history includes multiple members of the family with breast, ovarian, colon and prostate cancer  Pt expects the genetics in the mail today and will go right to the [facility]'s lab to have them drawn  My Chart message sent with information regarding Cancer Support community and with Nurse Navigator contact information along with Ernie Lara 57 Coordinator  Pt aware she can reach out with questions and general assessment completed  Spoke for > 25 minutes

## 2022-12-29 ENCOUNTER — CONSULT (OUTPATIENT)
Dept: SURGICAL ONCOLOGY | Facility: CLINIC | Age: 37
End: 2022-12-29

## 2022-12-29 ENCOUNTER — APPOINTMENT (OUTPATIENT)
Dept: LAB | Facility: HOSPITAL | Age: 37
End: 2022-12-29

## 2022-12-29 ENCOUNTER — HOSPITAL ENCOUNTER (OUTPATIENT)
Dept: CT IMAGING | Facility: HOSPITAL | Age: 37
End: 2022-12-29
Attending: SURGERY

## 2022-12-29 VITALS
BODY MASS INDEX: 32.25 KG/M2 | HEIGHT: 63 IN | DIASTOLIC BLOOD PRESSURE: 80 MMHG | RESPIRATION RATE: 18 BRPM | OXYGEN SATURATION: 95 % | TEMPERATURE: 98.2 F | SYSTOLIC BLOOD PRESSURE: 138 MMHG | HEART RATE: 90 BPM | WEIGHT: 182 LBS

## 2022-12-29 DIAGNOSIS — C77.3 CARCINOMA OF RIGHT BREAST METASTATIC TO AXILLARY LYMPH NODE (HCC): ICD-10-CM

## 2022-12-29 DIAGNOSIS — C50.911 CARCINOMA OF RIGHT BREAST METASTATIC TO AXILLARY LYMPH NODE (HCC): ICD-10-CM

## 2022-12-29 DIAGNOSIS — C50.811 MALIGNANT NEOPLASM OF OVERLAPPING SITES OF RIGHT FEMALE BREAST, UNSPECIFIED ESTROGEN RECEPTOR STATUS (HCC): ICD-10-CM

## 2022-12-29 DIAGNOSIS — C50.811 MALIGNANT NEOPLASM OF OVERLAPPING SITES OF RIGHT FEMALE BREAST, UNSPECIFIED ESTROGEN RECEPTOR STATUS (HCC): Primary | ICD-10-CM

## 2022-12-29 LAB
ALBUMIN SERPL BCP-MCNC: 3.9 G/DL (ref 3.5–5)
ALP SERPL-CCNC: 89 U/L (ref 46–116)
ALT SERPL W P-5'-P-CCNC: 30 U/L (ref 12–78)
ANION GAP SERPL CALCULATED.3IONS-SCNC: 8 MMOL/L (ref 4–13)
AST SERPL W P-5'-P-CCNC: 16 U/L (ref 5–45)
BILIRUB SERPL-MCNC: 0.46 MG/DL (ref 0.2–1)
BUN SERPL-MCNC: 8 MG/DL (ref 5–25)
CALCIUM SERPL-MCNC: 9 MG/DL (ref 8.3–10.1)
CHLORIDE SERPL-SCNC: 101 MMOL/L (ref 96–108)
CO2 SERPL-SCNC: 28 MMOL/L (ref 21–32)
CREAT SERPL-MCNC: 0.72 MG/DL (ref 0.6–1.3)
GFR SERPL CREATININE-BSD FRML MDRD: 105 ML/MIN/1.73SQ M
GLUCOSE SERPL-MCNC: 110 MG/DL (ref 65–140)
POTASSIUM SERPL-SCNC: 3.7 MMOL/L (ref 3.5–5.3)
PROT SERPL-MCNC: 7.7 G/DL (ref 6.4–8.4)
SODIUM SERPL-SCNC: 137 MMOL/L (ref 135–147)

## 2022-12-29 RX ADMIN — IOHEXOL 100 ML: 350 INJECTION, SOLUTION INTRAVENOUS at 13:57

## 2022-12-29 NOTE — PROGRESS NOTES
Surgical Oncology Consult Note       [facility]  [facility] [place]  [address] [facility]  1985  [id number]      Chief Complaint   Patient presents with   • Consult     NP ref by [facility]   12/22/22 Right breast USG bx A  2:00 6cmfn IDC G2 LVI not identified receptors pending has akanksha B  4:00 6CMFN IDC receptors pending G2 LVI not identified has akanksha c  Right axillary LN mets consistent w/ primary has akanksha  malignancy appears multifocal  malignancy spans 5 8cm extent of disease may be greater  b/l mri recommended left breast clear staging workup? 12/15/22 dx b/l mammo          Assessment/Plan    1  Malignant neoplasm of overlapping sites of right female breast, unspecified estrogen receptor status ([facility] [address] )  -     MRI breast bilateral w and wo contrast w cad; Future; Expected date: 12/29/2022  -     Ambulatory Referral to Hematology / Oncology; Future  -     CT chest abdomen pelvis w contrast; Future; Expected date: 12/29/2022  -     NM bone scan whole body; Future; Expected date: 12/29/2022    2  Carcinoma of right breast metastatic to axillary lymph node ([facility] [address] )  -     MRI breast bilateral w and wo contrast w cad; Future; Expected date: 12/29/2022  -     Ambulatory Referral to Hematology / Oncology; Future  -     CT chest abdomen pelvis w contrast; Future; Expected date: 12/29/2022  -     NM bone scan whole body; Future; Expected date: 12/29/2022         Oncology History    No history exists  This is a 25-year-old very pleasant female with recent diagnosis of the right breast invasive ductal carcinoma metastasis to the axilla referred for further management receptor status pending  She states she was playing with her son and noticed this mass  This prompted further work-up and biopsy  She has strong family history of breast cancer and genetic test has been sent    She is here with her significant other for further work-up and management  Review of Systems   Constitutional: Negative for chills and fever  HENT: Negative for ear pain and sore throat  Eyes: Negative for pain and visual disturbance  Respiratory: Negative for cough and shortness of breath  Cardiovascular: Negative for chest pain and palpitations  Gastrointestinal: Negative for abdominal pain and vomiting  Genitourinary: Negative for dysuria and hematuria  Musculoskeletal: Negative for arthralgias and back pain  Skin: Negative for color change and rash  Neurological: Negative for seizures and syncope  All other systems reviewed and are negative         Past Medical History:      Patient Active Problem List   Diagnosis   • Hypertension   • Asthma   • Cervical radiculopathy   • Chronic bilateral low back pain   • DDD (degenerative disc disease), lumbar   • Binge eating disorder   • BMI 33 0-33 9,adult   • Pre-diabetes   • Cigarette nicotine dependence without complication   • Malignant neoplasm of overlapping sites of right female breast [facility])   • Carcinoma of right breast metastatic to axillary lymph node (HCC)        Past Medical History:   Diagnosis Date   • Asthma    • Hypertension         Past Surgical History:   Procedure Laterality Date   •  SECTION     • TUBAL LIGATION     • US GUIDANCE BREAST BIOPSY RIGHT EACH ADDITIONAL Right 2022   • US GUIDED BREAST BIOPSY RIGHT COMPLETE Right 2022   • US GUIDED BREAST LYMPH NODE BIOPSY RIGHT Right 2022        Family History   Problem Relation Age of Onset   • Lung cancer Mother 61   • Ovarian cancer Mother    • Skin cancer Maternal Grandmother    • Ovarian cancer Maternal Grandmother    • Prostate cancer Maternal Grandfather    • No Known Problems Daughter    • No Known Problems Daughter    • Ovarian cancer Maternal Aunt    • Breast cancer Paternal Aunt         Social History     Socioeconomic History   • Marital status: Single     Spouse name: Not on file   • Number of children: Not on file   • Years of education: Not on file   • Highest education level: Not on file   Occupational History   • Not on file   Tobacco Use   • Smoking status: Every Day     Packs/day: 0 50     Years: 15 00     Pack years: 7 50     Types: Cigarettes   • Smokeless tobacco: Never   Vaping Use   • Vaping Use: Never used   Substance and Sexual Activity   • Alcohol use: Not Currently   • Drug use: Never   • Sexual activity: Yes     Partners: Male     Birth control/protection: Female Sterilization   Other Topics Concern   • Not on file   Social History Narrative   • Not on file     Social Determinants of Health     Financial Resource Strain: Not on file   Food Insecurity: Not on file   Transportation Needs: Not on file   Physical Activity: Not on file   Stress: Not on file   Social Connections: Not on file   Intimate Partner Violence: Not on file   Housing Stability: Not on file        Current Outpatient Medications:   •  albuterol (PROVENTIL HFA,VENTOLIN HFA) 90 mcg/act inhaler, inhale 2 puffs by mouth and INTO THE LUNGS every 6 hours if needed for wheezing, Disp: 18 g, Rfl: 11  •  amLODIPine (NORVASC) 5 mg tablet, Take 2 tablets (10 mg total) by mouth daily, Disp: 30 tablet, Rfl: 2  •  diclofenac (VOLTAREN) 75 mg EC tablet, Take 1 tablet (75 mg total) by mouth 2 (two) times a day, Disp: 60 tablet, Rfl: 2  •  fexofenadine (ALLEGRA) 180 MG tablet, Take 1 tablet (180 mg total) by mouth daily, Disp: 30 tablet, Rfl: 5  •  montelukast (SINGULAIR) 10 mg tablet, take 1 tablet by mouth at bedtime, Disp: 30 tablet, Rfl: 5  •  Symbicort 160-4 5 MCG/ACT inhaler, inhale 2 puffs by mouth twice a day Rinse mouth after use, Disp: 10 2 g, Rfl: 5   No Known Allergies    Physical Exam:     Vitals:    12/29/22 1021   BP: 138/80   Pulse: 90   Resp: 18   Temp: 98 2 °F (36 8 °C)   SpO2: 95%     Physical Exam  Constitutional:       Appearance: Normal appearance  HENT:      Head: Normocephalic and atraumatic        Nose: Nose normal       Mouth/Throat:      Mouth: Mucous membranes are moist    Eyes:      Pupils: Pupils are equal, round, and reactive to light  Cardiovascular:      Rate and Rhythm: Normal rate  Pulses: Normal pulses  Heart sounds: Normal heart sounds  Pulmonary:      Effort: Pulmonary effort is normal       Breath sounds: Normal breath sounds  Chest:          Comments: She has a large mass encompassing right breast upper inner and upper lower quadrant  She also has palpable right axillary lymphadenopathy  Left breast no palpable mass masses or nipple discharge nipple retraction  Abdominal:      General: Bowel sounds are normal       Palpations: Abdomen is soft  Musculoskeletal:         General: Normal range of motion  Cervical back: Normal range of motion and neck supple  Skin:     General: Skin is warm  Neurological:      General: No focal deficit present  Mental Status: She is alert and oriented to person, place, and time  Psychiatric:         Mood and Affect: Mood normal          Behavior: Behavior normal          Thought Content: Thought content normal          Judgment: Judgment normal          Results:   A  Ultrasound biopsy right breast 2:00, 6 cm from nipple, 4 passes 14-gauge marquee:      - Invasive mammary carcinoma of no special type (ductal NST/invasive ductal carcinoma), see note      - Laverne grade 2 of 3 (total score: 7 of 9)          - Tubule formation: <10%, score 3          - Nuclear grade 3 of 3, score 3          - Mitoses < 3/mm2, (</= 7 mitoses/10HPF), score 1      - Invasive carcinoma involves 4 of 4 submitted core biopsies, max  dimension = 16 millimeters      - Ductal carcinoma in situ (DCIS): Not identified      - Lymphovascular invasion: Not identified      - Microcalcifications: Absent      - See synoptic report A     B   Right breast ultrasound biopsy at 4:00, 6 cm from nipple, 4 passes, 14-gauge marquee:      - Invasive mammary carcinoma of no special type (ductal NST/invasive ductal carcinoma) with apocrine features, see note      - Laverne grade 2 of 3 (total score: 6 of 9)          - Tubule formation: 10% to 75%, score 2          - Nuclear grade 3 of 3, score 3          - Mitoses < 3/mm2, (</= 7 mitoses/10HPF), score 1      - Invasive carcinoma involves 4 of 4 submitted core biopsies, max  dimension = 15 millimeters      - Ductal carcinoma in situ (DCIS): Not identified      - Lymphovascular invasion: Not identified      - Microcalcifications: Absent      - See synoptic report B     C  Ultrasound right breast biopsy and axillary lymph node, 4 passes 14-gauge marquee:      - Metastatic carcinoma, consistent with mammary primary, see note     Mammogram and ultrasounds were reviewed by myself as well    Discussion/Summary:   3year-old female with newly diagnosed right breast cancer metastasis to the axillary lymph node  We did discuss in detail about breast cancer and pending receptor status  We will order staging work-up bone scan and CT chest abdomen pelvis  We will also refer her to medical oncologist to discuss role for neoadjuvant treatment pending receptor status  We will also obtain breast MRI and bring her back  She and her significant other had several questions I answered all of them with above-mentioned studies we will follow her within the next 8-10 business days  I have also discussed with medical oncologist to discuss possible neoadjuvant radiation  He is kind enough to see him next week and I will sincerely appreciated  In my opinion she will benefit from neoadjuvant chemotherapy of course pending all day staging work-up   Marie Jose MD discussed the disease status, and treatment plans with Archana Bunn today 12/29/22 and will follow-up with the patient

## 2022-12-29 NOTE — PROGRESS NOTES
Hematology/Oncology Outpatient Office Note    Date of Service: 2023    [facility] HEMATOLOGY ONCOLOGY SPECIALISTS [address] [facility]    Reason for Consultation:   Chief Complaint   Patient presents with   • Consult       Cancer Stage at diagnosis: IIIB    Referral Physician: Dany Rivas Has*    Primary Care Physician:  Ramy Ascencio PA-C     Nickname: [place]    Spouse: Mandeep    Original ECO    Today's ECO    Goals and Barriers:  Current Goal: Minimize effects of disease burden, extend life  Barriers to accomplishing this: None    Patient's Capacity to Self Care:  Patient is able to self care    Code Status: not addressed today    Advanced directives: not addressed today    ASSESSMENT & PLAN      Diagnosis ICD-10-CM Associated Orders   1  Carcinoma of right breast metastatic to axillary lymph node [facility])  C50 911 Ambulatory referral to Interventional Radiology    C77 3 Ambulatory Referral to Hematology / Oncology     lidocaine-prilocaine (EMLA) cream      2  Malignant neoplasm of overlapping sites of right female breast, unspecified estrogen receptor status ([facility] [address] )  C50 811 Ambulatory Referral to Hematology / Oncology      3  Chemotherapy induced nausea and vomiting  R11 2 ondansetron (ZOFRAN-ODT) 8 mg disintegrating tablet    T45 1X5A             This is a 42 y o  c PMHx notable for asthma, HTN, being seen in consultation for locally advanced triple negative breast cancer      Discussion of decision making  • Oncology history updated, accordingly, during this visit  • Goals of care/patient communication  o I discussed with the patient the clinical course leading up to their cancer diagnosis  I reviewed relevant office notes, imaging reports and pathology result as well   o I told the patient that this is a case of curable disease and what this means   We discussed that the goal of anti-cancer therapy is to provide best quality of life, extend overall survival, and progression free survival as shown in clinical trials  We also discussed that there might be a point when the cancer will no longer respond to this anti-neoplastic therapy   o I explained the risks/benefits of the proposed cancer therapy: Pembro + Carbo-Taxol for 4 cycles then 4 cycles of Pembro + adriamycin/cytoxan followed by surgery and then maintenance Pembro (for 9 cycles) and after discussion including understanding risks of possible life-threatening complications and therapy-related malignancy development, informed consent for blood products and treatment has been signed and obtained     • TNM/Staging At Diagnosis  o [id number]  Cancer Staging   IIIB  • Disease Features/Tumor Markers/Genetics  o Tumor Marker: n/a  o Notable Path Features: 12/22/2022: Invasive mammary carcinoma, suma grade 2, nuclear grade 3  ER/FL/HER-2 0, Ki-67 50%, nuclear grade 3  • Treatment: neoadjuvant Pembro-Carbo-taxol followed by [facility], then surgery, then maintenance Pembro  • Other Supportive care: Neulasta  • Treatment Team Members  o Surgeon: Dr Monica Luna  12/15/2022 Right Mammogram diagnostic bilateral w 3d & cad: There is an ill-defined approximately 5 cm focal asymmetry seen in the inner central region of the right breast in the anterior depth  The asymmetry correlates with the palpable mass reported by the patient  US breast right limited (diagnostic): Within the area of palpable concern, there are multiple ill-defined hypoechoic masses  Biopsy of the 17 mm x 13 mm x 23 mm irregularly shaped, hypoechoic mass with indistinct margins in the right breast at 2 o'clock, 6 cm from the nipple and 20 mm x 7 mm x 28 mm irregularly shaped, hypoechoic mass with indistinct margins seen in the right breast at 4 o'clock, 6 cm from the nipple are recommended  US breast right limited (diagnostic):  A lymph node with focally thickened cortex is noted in the right axilla  12/29/2022 CT CAP w/c: Multiple small lung nodules  These may be inflammatory though a three-month follow-up is recommended to completely exclude metastasis  Unremarkable abdomen and pelvis without evidence of metastatic disease  Minimal paraseptal emphysema at the apices  1/4/2023 Nuc Bone scan: No scintigraphic evidence of osseous metastasis    We discussed possibility of infertility on chemo on 1/4/2023 but she has deferred fertility referral as she is not interested in having more children  Discussion of decision making    I personally reviewed the following lab results, the image studies, pathology, other specialty/physicians consult notes and recommendations, and outside medical records from [facility]  I had a lengthy discussion with the patient and shared the work-up findings  We discussed the diagnosis and management plan as below  I spent 62 minutes reviewing the records (labs, clinician notes, outside records, medical history, ordering medicine/tests/procedures, interpreting the imaging/labs previously done) and coordination of care as well as direct time with the patient today, of which greater than 50% of the time was spent in counseling and coordination of care with the patient/family  · Plan/Labs  · Zofran 8mg q8 prn nausea/vomiting to be sent home  · IR referral port placement  · F/u Surg Onc for eventual goal of surgery  ? Infusion chairs to be scheduled for neoadjuvant Pembro-Carbo-taxol for 4 cycles then 4 cycles of Pembro + adriamycin/cytoxan followed by surgery  After surgery, plan is for maintenance Pembro for 9 cycles  Labs preceding chemo/immunotherapy to be completed with CBC, CMP, TSH  ? F/u genetics counselor testing  ? TTE to be completed prior to Adriamycin being given    Follow Up: q3 weeks for 8 total visits alternating between [facility] and I    All questions were answered to the patient's satisfaction during this encounter   The patient knows the contact information for our office and knows to reach out for any relevant concerns related to this encounter  They are to call for any temperature 100 4 or higher, new symptoms including but not restricted to shaking chills, decreased appetite, nausea, vomiting, diarrhea, increased fatigue, shortness of breath or chest pain, confusion, and not feeling the strength to come to the clinic  For all other listed problems and medical diagnosis in their chart - they are managed by PCP and/or other specialists, which the patient acknowledges  Thank you very much for your consultation and making us a part of this patient's care  We are continuing to follow closely with you  Please do not hesitate to reach out to me with any additional questions or concerns  Julio Cesar Ayers MD  Hematology & Medical Oncology Staff Physician             Disclaimer: This document was prepared using [employer] technology  If a word or phrase is confusing, or does not make sense, this is likely due to recognition error which was not discovered during this clinician's review  If you believe an error has occurred, please contact me through [address] [place] [facility] line for regina? cation        ONCOLOGY HISTORY OF PRESENT ILLNESS        Oncology History   Carcinoma of right breast metastatic to axillary lymph node ([facility] [address] )   12/22/2022 -  Cancer Staged    Staging form: Breast, AJCC 8th Edition  - Clinical stage from 12/22/2022: Stage IIIB (cT3, cN1, cM0, G2, ER-, CA-, HER2-) - Signed by Lilliana Ramsey MD on 1/4/2023  Stage prefix: Initial diagnosis  Nuclear grade: G3  Mitotic count score: Score 1  Histologic grading system: 3 grade system  Menopausal status: Premenopausal       12/29/2022 Initial Diagnosis    Carcinoma of right breast metastatic to axillary lymph node (HCC)     1/11/2023 -  Chemotherapy    DOXOrubicin (ADRIAMYCIN), 60 mg/m2, Intravenous, Once, 0 of 4 cycles  cyclophosphamide (CYTOXAN) IVPB, 600 mg/m2, Intravenous, Once, 0 of 4 cycles  fosaprepitant (EMEND) IVPB, 150 mg, Intravenous, Once, 0 of 4 cycles  CARBOplatin (PARAPLATIN) IVPB (GOG AUC DOSING), , Intravenous, Once, 0 of 4 cycles  PACLItaxel (TAXOL) chemo IVPB, 80 mg/m2, Intravenous, Once, 0 of 4 cycles  pembrolizumab (KEYTRUDA) IVPB, 200 mg, Intravenous, Once, 0 of 17 cycles           SUBJECTIVE  (INTERVAL HISTORY)      Clotting History    Bleeding History    Cancer History    Family Cancer History    H/O Blood/Plt Transfusion    Tobacco/etoh/drug abuse    Hx COVID19 Infection and Vaccine Status    Cancer Screening history    Occupation    New medications in the last month:  Pain:      I have reviewed the relevant past medical, surgical, social and family history  I have also reviewed allergies and medications for this patient  Review of Systems  ROS      A 10-point review of system was performed, pertinent positive and negative were detailed as above  Otherwise, the 10-point review of system was negative        Past Medical History:   Diagnosis Date   • Asthma    • Hypertension        Past Surgical History:   Procedure Laterality Date   •  SECTION     • TUBAL LIGATION     • US GUIDANCE BREAST BIOPSY RIGHT EACH ADDITIONAL Right 2022   • US GUIDED BREAST BIOPSY RIGHT COMPLETE Right 2022   • US GUIDED BREAST LYMPH NODE BIOPSY RIGHT Right 2022       Family History   Problem Relation Age of Onset   • Lung cancer Mother 61   • Ovarian cancer Mother    • Skin cancer Maternal Grandmother    • Ovarian cancer Maternal Grandmother    • Prostate cancer Maternal Grandfather    • No Known Problems Daughter    • No Known Problems Daughter    • Ovarian cancer Maternal Aunt    • Breast cancer Paternal Aunt        Social History     Socioeconomic History   • Marital status: Single     Spouse name: Not on file   • Number of children: Not on file   • Years of education: Not on file   • Highest education level: Not on file   Occupational History   • Not on file   Tobacco Use   • Smoking status: Every Day     Packs/day: 0 50     Years: 15 00     Pack years: 7 50     Types: Cigarettes   • Smokeless tobacco: Never   Vaping Use   • Vaping Use: Never used   Substance and Sexual Activity   • Alcohol use: Not Currently   • Drug use: Never   • Sexual activity: Yes     Partners: Male     Birth control/protection: Female Sterilization   Other Topics Concern   • Not on file   Social History Narrative   • Not on file     Social Determinants of Health     Financial Resource Strain: Not on file   Food Insecurity: Not on file   Transportation Needs: Not on file   Physical Activity: Not on file   Stress: Not on file   Social Connections: Not on file   Intimate Partner Violence: Not on file   Housing Stability: Not on file       No Known Allergies    Current Outpatient Medications   Medication Sig Dispense Refill   • albuterol (PROVENTIL HFA,VENTOLIN HFA) 90 mcg/act inhaler inhale 2 puffs by mouth and INTO THE LUNGS every 6 hours if needed for wheezing 18 g 11   • amLODIPine (NORVASC) 5 mg tablet Take 2 tablets (10 mg total) by mouth daily 60 tablet 2   • diclofenac (VOLTAREN) 75 mg EC tablet Take 1 tablet (75 mg total) by mouth 2 (two) times a day 60 tablet 2   • fexofenadine (ALLEGRA) 180 MG tablet Take 1 tablet (180 mg total) by mouth daily 30 tablet 5   • lidocaine-prilocaine (EMLA) cream Apply topically as needed for mild pain 30 g 0   • montelukast (SINGULAIR) 10 mg tablet take 1 tablet by mouth at bedtime 30 tablet 5   • ondansetron (ZOFRAN-ODT) 8 mg disintegrating tablet Take 1 tablet (8 mg total) by mouth every 8 (eight) hours as needed for nausea or vomiting 20 tablet 0   • Symbicort 160-4 5 MCG/ACT inhaler inhale 2 puffs by mouth twice a day Rinse mouth after use 10 2 g 5     No current facility-administered medications for this visit  (Not in a hospital admission)      Objective:     24 Hour Vitals Assessment:     Vitals:    01/04/23 1514   Resp: 16       PHYSICIAN EXAM:    General: Appearance: alert, cooperative, no distress    HEENT: Normocephalic, atraumatic  No scleral icterus  conjunctivae clear  EOMI  Chest: No tenderness to palpation  No open wound noted  Lungs: Clear to auscultation bilaterally, Respirations unlabored  Cardiac: Regular rate and rhythm, +S1and S2  Abdomen: Soft, non-tender, non-distended  Bowel sounds are normal   Breast: L breast unremarkable, R breast with 5x4 cm mass over the R [place]-quadrant of the breast, no inverted nipple  Extremities:  No edema, cyanosis, clubbing  Skin: Skin color, turgor are normal  No rashes  Lymphatics: no palpable supra-cervical, axillary, or inguinal adenopathy  Neurologic: Awake, Alert, and oriented, no gross focal deficits noted b/l  DATA REVIEW:    Pathology Result:    Final Diagnosis   Date Value Ref Range Status   12/22/2022   Final    A  Ultrasound biopsy right breast 2:00, 6 cm from nipple, 4 passes 14-gauge marquee:      - Invasive mammary carcinoma of no special type (ductal NST/invasive ductal carcinoma), see note      - Thurmond grade 2 of 3 (total score: 7 of 9)          - Tubule formation: <10%, score 3          - Nuclear grade 3 of 3, score 3          - Mitoses < 3/mm2, (</= 7 mitoses/10HPF), score 1      - Invasive carcinoma involves 4 of 4 submitted core biopsies, max  dimension = 16 millimeters      - Ductal carcinoma in situ (DCIS): Not identified      - Lymphovascular invasion: Not identified      - Microcalcifications: Absent      - See synoptic report A    B  Right breast ultrasound biopsy at 4:00, 6 cm from nipple, 4 passes, 14-gauge marquee:      - Invasive mammary carcinoma of no special type (ductal NST/invasive ductal carcinoma) with apocrine features, see note      - Laverne grade 2 of 3 (total score: 6 of 9)          - Tubule formation: 10% to 75%, score 2          - Nuclear grade 3 of 3, score 3          - Mitoses < 3/mm2, (</= 7 mitoses/10HPF), score 1      - Invasive carcinoma involves 4 of 4 submitted core biopsies, max   dimension = 15 millimeters      - Ductal carcinoma in situ (DCIS): Not identified      - Lymphovascular invasion: Not identified      - Microcalcifications: Absent      - See synoptic report B    C  Ultrasound right breast biopsy and axillary lymph node, 4 passes 14-gauge marquee:      - Metastatic carcinoma, consistent with mammary primary, see note           Image Results:   Image result are reviewed and documented in Hematology/Oncology history    NM bone scan whole body  Narrative: BONE SCAN  WHOLE BODY    INDICATION: C50 811: Malignant neoplasm of overlapping sites of right female breast  C50 911: Malignant neoplasm of unspecified site of right female breast  C77 3: Secondary and unspecified malignant neoplasm of axilla and upper limb lymph nodes    PREVIOUS FILM CORRELATION:    CT chest abdomen pelvis 12/29/2022    TECHNIQUE:   This study was performed following the intravenous administration of 23 6 mCi Tc-99m labeled MDP  Delayed, anterior and posterior whole body images were acquired, 2-3 hours after radiopharmaceutical administration  Additional delayed   oblique static images acquired of the bilateral ribs and pelvis  FINDINGS:     Small focus of radiotracer uptake at the right mandible may be related to dental disease  Small focus of radiotracer uptake at the left wrist typical location for degenerative changes  Otherwise normal distribution of radiotracer  There is no scintigraphic evidence of osseous metastasis  Fairly symmetric physiologic renal activity  Impression: 1  No scintigraphic evidence of osseous metastasis  Workstation performed: [id number]      LABS:  Lab data are reviewed and documented in HemOnc history         Lab Results   Component Value Date    HGB 14 2 10/27/2021    HCT 43 5 10/27/2021    MCV 93 10/27/2021     10/27/2021    WBC 8 18 10/27/2021     Lab Results   Component Value Date    K 3 7 12/29/2022     12/29/2022    CO2 28 12/29/2022    BUN 8 12/29/2022 CREATININE 0 72 12/29/2022    GLUF 105 (H) 10/27/2021    CALCIUM 9 0 12/29/2022    AST 16 12/29/2022    ALT 30 12/29/2022    ALKPHOS 89 12/29/2022    EGFR 105 12/29/2022       No results found for: IRON, TIBC, FERRITIN    No results found for: [id number]    No results for input(s): WBC, CREAT in the last 72 hours      Invalid input(s):  PLT    By:  Brooklynn Littlejohn MD, 1/4/2023, 3:17 PM

## 2022-12-30 DIAGNOSIS — I10 PRIMARY HYPERTENSION: ICD-10-CM

## 2022-12-30 DIAGNOSIS — J30.2 SEASONAL ALLERGIES: ICD-10-CM

## 2022-12-30 DIAGNOSIS — J45.50 SEVERE PERSISTENT ASTHMA, UNSPECIFIED WHETHER COMPLICATED: ICD-10-CM

## 2022-12-30 RX ORDER — AMLODIPINE BESYLATE 5 MG/1
10 TABLET ORAL DAILY
Qty: 60 TABLET | Refills: 2 | Status: SHIPPED | OUTPATIENT
Start: 2022-12-30 | End: 2023-03-30

## 2022-12-30 RX ORDER — AMLODIPINE BESYLATE 5 MG/1
10 TABLET ORAL DAILY
Qty: 30 TABLET | Refills: 2 | Status: CANCELLED | OUTPATIENT
Start: 2022-12-30

## 2022-12-30 RX ORDER — MONTELUKAST SODIUM 10 MG/1
TABLET ORAL
Qty: 30 TABLET | Refills: 5 | Status: SHIPPED | OUTPATIENT
Start: 2022-12-30

## 2022-12-30 RX ORDER — AMLODIPINE BESYLATE 5 MG/1
10 TABLET ORAL DAILY
Qty: 30 TABLET | Refills: 0 | Status: CANCELLED | OUTPATIENT
Start: 2022-12-30

## 2022-12-30 NOTE — TELEPHONE ENCOUNTER
Called patient to check on how she is doing with her recent diagnosis of breast cancer  Pt is aware of the diagnosis and is aware about all the appointments scheduled with different specialists  Advised her to call us back for any help with mental health if needed  I also refilled her amlodipine

## 2023-01-04 ENCOUNTER — TELEPHONE (OUTPATIENT)
Dept: HEMATOLOGY ONCOLOGY | Facility: CLINIC | Age: 38
End: 2023-01-04

## 2023-01-04 ENCOUNTER — CONSULT (OUTPATIENT)
Dept: HEMATOLOGY ONCOLOGY | Facility: CLINIC | Age: 38
End: 2023-01-04

## 2023-01-04 ENCOUNTER — HOSPITAL ENCOUNTER (OUTPATIENT)
Dept: NUCLEAR MEDICINE | Facility: HOSPITAL | Age: 38
Discharge: HOME | End: 2023-01-04
Attending: SURGERY

## 2023-01-04 VITALS
OXYGEN SATURATION: 97 % | DIASTOLIC BLOOD PRESSURE: 72 MMHG | SYSTOLIC BLOOD PRESSURE: 126 MMHG | HEART RATE: 114 BPM | HEIGHT: 63 IN | TEMPERATURE: 99.4 F | BODY MASS INDEX: 32.6 KG/M2 | RESPIRATION RATE: 16 BRPM | WEIGHT: 184 LBS

## 2023-01-04 DIAGNOSIS — C50.911 CARCINOMA OF RIGHT BREAST METASTATIC TO AXILLARY LYMPH NODE (HCC): ICD-10-CM

## 2023-01-04 DIAGNOSIS — C50.811 MALIGNANT NEOPLASM OF OVERLAPPING SITES OF RIGHT FEMALE BREAST, UNSPECIFIED ESTROGEN RECEPTOR STATUS (HCC): ICD-10-CM

## 2023-01-04 DIAGNOSIS — C77.3 CARCINOMA OF RIGHT BREAST METASTATIC TO AXILLARY LYMPH NODE (HCC): ICD-10-CM

## 2023-01-04 DIAGNOSIS — R11.2 CHEMOTHERAPY INDUCED NAUSEA AND VOMITING: ICD-10-CM

## 2023-01-04 DIAGNOSIS — C50.911 CARCINOMA OF RIGHT BREAST METASTATIC TO AXILLARY LYMPH NODE (HCC): Primary | ICD-10-CM

## 2023-01-04 DIAGNOSIS — C77.3 CARCINOMA OF RIGHT BREAST METASTATIC TO AXILLARY LYMPH NODE (HCC): Primary | ICD-10-CM

## 2023-01-04 DIAGNOSIS — T45.1X5A CHEMOTHERAPY INDUCED NAUSEA AND VOMITING: ICD-10-CM

## 2023-01-04 RX ORDER — LIDOCAINE AND PRILOCAINE 25; 25 MG/G; MG/G
CREAM TOPICAL AS NEEDED
Qty: 30 G | Refills: 0 | Status: SHIPPED | OUTPATIENT
Start: 2023-01-04

## 2023-01-04 RX ORDER — ONDANSETRON 8 MG/1
8 TABLET, ORALLY DISINTEGRATING ORAL EVERY 8 HOURS PRN
Qty: 20 TABLET | Refills: 0 | Status: SHIPPED | OUTPATIENT
Start: 2023-01-04

## 2023-01-04 NOTE — PROGRESS NOTES
Reviewed oncolink printouts for Carboplatin, paclitaxel, keytruda, adriamycin and cytoxan  Reviewed possible side effects  Reviewed infusion expectations and lab recommendations  Reviewed office handouts with RN Teams number and [facility] number  Encouraged patient to reach out to our office with further questions and concerns  Consent obtained on 1/4/23  Copy given to patient and uploaded into patient chart

## 2023-01-04 NOTE — TELEPHONE ENCOUNTER
Follow-up disposition: Return in about 3 weeks (around 1/25/2023) for Infusion - See Treatment Plan, Office Visit, Labs - See Treatment Plan  Check out comments: Every 3 weeks for 8 total visits alternating between Nilam and ASHLY Demarco  in 3 weeks  6 weeks with me and so on   ECHO imaging / Scheduled for 1/9/23     Please schedule treatment and f/u appts  For patient  Thank you!

## 2023-01-05 ENCOUNTER — HOSPITAL ENCOUNTER (OUTPATIENT)
Dept: MRI IMAGING | Facility: HOSPITAL | Age: 38
End: 2023-01-05
Attending: SURGERY

## 2023-01-05 DIAGNOSIS — C77.3 CARCINOMA OF RIGHT BREAST METASTATIC TO AXILLARY LYMPH NODE (HCC): Primary | ICD-10-CM

## 2023-01-05 DIAGNOSIS — C50.911 CARCINOMA OF RIGHT BREAST METASTATIC TO AXILLARY LYMPH NODE (HCC): ICD-10-CM

## 2023-01-05 DIAGNOSIS — C50.811 MALIGNANT NEOPLASM OF OVERLAPPING SITES OF RIGHT FEMALE BREAST, UNSPECIFIED ESTROGEN RECEPTOR STATUS (HCC): ICD-10-CM

## 2023-01-05 DIAGNOSIS — C50.911 CARCINOMA OF RIGHT BREAST METASTATIC TO AXILLARY LYMPH NODE (HCC): Primary | ICD-10-CM

## 2023-01-05 DIAGNOSIS — C77.3 CARCINOMA OF RIGHT BREAST METASTATIC TO AXILLARY LYMPH NODE (HCC): ICD-10-CM

## 2023-01-05 RX ADMIN — GADOBUTROL 8 ML: 604.72 INJECTION INTRAVENOUS at 07:32

## 2023-01-05 NOTE — TELEPHONE ENCOUNTER
While we try to accommodate patient requests, our priority is to schedule treatment according to Doctor's orders and site availability  Does the Provider use the intake sheet or checkout note? What would be a preferred day of the week that would work best for your infusion appointment? Marcia Benavides you prefer mornings or afternoons for your appointments? EARLY MORNING  Are there any days or dates that do not work for your schedule, including any upcoming vacations? NO  We are going to try our best to schedule you at the infusion center closest to your home  In the event that we are unable to what would be your next preferred infusion site or sites? 1  [facility]  2    3      Do you have transportation to take you to all of your appointments?  YES  Would you like the infusion center to draw labs from your port? (disregard if patient doesn't have a port or need labs for infusion appointment) NO

## 2023-01-05 NOTE — TELEPHONE ENCOUNTER
Per patient will check mychart for schedule, if any reschedule is needed patient will reach out to reschedule

## 2023-01-06 RX ORDER — CEFAZOLIN SODIUM 2 G/50ML
2000 SOLUTION INTRAVENOUS ONCE
Status: CANCELLED | OUTPATIENT
Start: 2023-01-06 | End: 2023-01-06

## 2023-01-06 RX ORDER — SODIUM CHLORIDE 9 MG/ML
75 INJECTION, SOLUTION INTRAVENOUS CONTINUOUS
Status: CANCELLED | OUTPATIENT
Start: 2023-01-06

## 2023-01-09 ENCOUNTER — TELEPHONE (OUTPATIENT)
Dept: HEMATOLOGY ONCOLOGY | Facility: CLINIC | Age: 38
End: 2023-01-09

## 2023-01-09 ENCOUNTER — HOSPITAL ENCOUNTER (OUTPATIENT)
Dept: NON INVASIVE DIAGNOSTICS | Facility: HOSPITAL | Age: 38
Discharge: HOME | End: 2023-01-09
Attending: INTERNAL MEDICINE

## 2023-01-09 ENCOUNTER — TELEPHONE (OUTPATIENT)
Dept: GENETICS | Facility: CLINIC | Age: 38
End: 2023-01-09

## 2023-01-09 VITALS
DIASTOLIC BLOOD PRESSURE: 72 MMHG | WEIGHT: 184 LBS | HEIGHT: 63 IN | BODY MASS INDEX: 32.6 KG/M2 | HEART RATE: 75 BPM | SYSTOLIC BLOOD PRESSURE: 126 MMHG

## 2023-01-09 DIAGNOSIS — C50.811 MALIGNANT NEOPLASM OF OVERLAPPING SITES OF RIGHT FEMALE BREAST, UNSPECIFIED ESTROGEN RECEPTOR STATUS (HCC): ICD-10-CM

## 2023-01-09 LAB
AORTIC ROOT: 2.6 CM
APICAL FOUR CHAMBER EJECTION FRACTION: 52 %
E WAVE DECELERATION TIME: 201 MS
FRACTIONAL SHORTENING: 31 % (ref 28–44)
GLOBAL LONGITUIDAL STRAIN: -18 %
INTERVENTRICULAR SEPTUM IN DIASTOLE (PARASTERNAL SHORT AXIS VIEW): 0.9 CM
INTERVENTRICULAR SEPTUM: 0.9 CM (ref 0.6–1.1)
LAAS-AP2: 11.8 CM2
LAAS-AP4: 13.7 CM2
LEFT ATRIUM SIZE: 3.5 CM
LEFT INTERNAL DIMENSION IN SYSTOLE: 3.6 CM (ref 2.1–4)
LEFT VENTRICULAR INTERNAL DIMENSION IN DIASTOLE: 5.2 CM (ref 3.5–6)
LEFT VENTRICULAR POSTERIOR WALL IN END DIASTOLE: 0.7 CM
LEFT VENTRICULAR STROKE VOLUME: 72 ML
LVSV (TEICH): 72 ML
MV E'TISSUE VEL-SEP: 10 CM/S
MV PEAK A VEL: 1.31 M/S
MV PEAK E VEL: 122 CM/S
MV STENOSIS PRESSURE HALF TIME: 58 MS
MV VALVE AREA P 1/2 METHOD: 3.79 CM2
RIGHT ATRIAL 2D VOLUME: 35 ML
RIGHT ATRIUM AREA SYSTOLE A4C: 13.9 CM2
RIGHT VENTRICLE ID DIMENSION: 2.9 CM
SL CV LEFT ATRIUM LENGTH A2C: 4.2 CM
SL CV LV EF: 60
SL CV PED ECHO LEFT VENTRICLE DIASTOLIC VOLUME (MOD BIPLANE) 2D: 128 ML
SL CV PED ECHO LEFT VENTRICLE SYSTOLIC VOLUME (MOD BIPLANE) 2D: 55 ML
TR MAX PG: 29 MMHG
TR PEAK VELOCITY: 2.7 M/S
TRICUSPID VALVE PEAK REGURGITATION VELOCITY: 2.69 M/S

## 2023-01-09 NOTE — PROGRESS NOTES
Title: medication change, insurance approved  (CYCLE 5)    Date patient scheduled: 4/7/23    Original medication ordered: emend 150 mg to be administered over 30 minutes    New Medication ordered: Cinvanti 130 mg to be administered over 30 minutes    Is the patient scheduled within 24 hours? ? If yes, follow up with verbal telephone call  NO    Office RN to route to [facility]  Infusion  pool routes to [facility] [place]  Infusion tech to receive message, confirm scheduled treatment duration matches ordered treatment duration or adjust accordingly, and re-link appointment request orders  Infusion tech to notify pharmacy and finance

## 2023-01-09 NOTE — TELEPHONE ENCOUNTER
Stat Genetic Test Result    Summary:    I spoke with [place] to review the result of her STAT genetic test      Test Performed: Lee Jeimy (8 genes): FRANKO, BRCA1, BRCA2, CDH1, CHEK2, PALB2, PTEN, TP53     Negative - No Clinically Significant Variants Detected      Assessment:     Negative   A negative result significantly reduces the likelihood that [place] has a hereditary cancer syndrome related to the high-risk breast cancer genes listed above  This result does not have surgical implications and surgical options should be discussed with her healthcare provider  Additional Testing: The Kris Milner (29 additional genes): APC, FRANKO, AXIN2 BARD1, BRCA1, BRCA2, BRIP1, BMPR1A, CDH1, CDK4, CDKN2A, CHEK2, DICER1, EPCAM, GREM1, HOXB13, MLH1, MSH2, MSH3, MSH6, MUTYH, NBN, NF1, NTHL1, PALB2, PMS2, POLD1, POLE, PTEN, RAD51C, RAD51D, RECQL SMAD4, SMARCA4, STK11, TP53 test is pending  We will contact [place] once results are available  Additional recommendations for surveillance/medical management will be made upon final genetic test result  [place]'s breast surgeon Dr Marin Camarena was made aware of this test result

## 2023-01-09 NOTE — TELEPHONE ENCOUNTER
Title: medication change, insurance approved  (CYCLE 5)    Date patient scheduled: 4/7/23    Original medication ordered: emend 150 mg to be administered over 30 minutes    New Medication ordered: Cinvanti 130 mg to be administered over 30 minutes    Is the patient scheduled within 24 hours? ? If yes, follow up with verbal telephone call  NO    Office RN to route to [facility]  Infusion  pool routes to [facility]  Infusion tech to receive message, confirm scheduled treatment duration matches ordered treatment duration or adjust accordingly, and re-link appointment request orders  Infusion tech to notify pharmacy and finance

## 2023-01-10 PROBLEM — C50.919 TRIPLE NEGATIVE BREAST CANCER (HCC): Status: ACTIVE | Noted: 2023-01-10

## 2023-01-10 PROBLEM — C50.811 MALIGNANT NEOPLASM OF OVERLAPPING SITES OF RIGHT FEMALE BREAST (HCC): Status: RESOLVED | Noted: 2022-12-29 | Resolved: 2023-01-10

## 2023-01-12 ENCOUNTER — TELEPHONE (OUTPATIENT)
Dept: HEMATOLOGY ONCOLOGY | Facility: CLINIC | Age: 38
End: 2023-01-12

## 2023-01-12 ENCOUNTER — HOSPITAL ENCOUNTER (OUTPATIENT)
Dept: INTERVENTIONAL RADIOLOGY/VASCULAR | Facility: HOSPITAL | Age: 38
Discharge: HOME | End: 2023-01-12
Attending: INTERNAL MEDICINE

## 2023-01-12 VITALS
TEMPERATURE: 98 F | RESPIRATION RATE: 18 BRPM | HEART RATE: 80 BPM | WEIGHT: 184 LBS | SYSTOLIC BLOOD PRESSURE: 130 MMHG | OXYGEN SATURATION: 100 % | HEIGHT: 63 IN | BODY MASS INDEX: 32.6 KG/M2 | DIASTOLIC BLOOD PRESSURE: 78 MMHG

## 2023-01-12 DIAGNOSIS — C50.911 CARCINOMA OF RIGHT BREAST METASTATIC TO AXILLARY LYMPH NODE (HCC): ICD-10-CM

## 2023-01-12 DIAGNOSIS — C77.3 CARCINOMA OF RIGHT BREAST METASTATIC TO AXILLARY LYMPH NODE (HCC): ICD-10-CM

## 2023-01-12 LAB
BASOPHILS # BLD AUTO: 0.06 THOUSANDS/ÂΜL (ref 0–0.1)
BASOPHILS NFR BLD AUTO: 1 % (ref 0–1)
EOSINOPHIL # BLD AUTO: 0.2 THOUSAND/ÂΜL (ref 0–0.61)
EOSINOPHIL NFR BLD AUTO: 3 % (ref 0–6)
ERYTHROCYTE [DISTWIDTH] IN BLOOD BY AUTOMATED COUNT: 12.4 % (ref 11.6–15.1)
HCT VFR BLD AUTO: 43.3 % (ref 34.8–46.1)
HGB BLD-MCNC: 14.4 G/DL (ref 11.5–15.4)
IMM GRANULOCYTES # BLD AUTO: 0.02 THOUSAND/UL (ref 0–0.2)
IMM GRANULOCYTES NFR BLD AUTO: 0 % (ref 0–2)
INR PPP: 0.97 (ref 0.84–1.19)
LYMPHOCYTES # BLD AUTO: 3.03 THOUSANDS/ÂΜL (ref 0.6–4.47)
LYMPHOCYTES NFR BLD AUTO: 37 % (ref 14–44)
MCH RBC QN AUTO: 31.1 PG (ref 26.8–34.3)
MCHC RBC AUTO-ENTMCNC: 33.3 G/DL (ref 31.4–37.4)
MCV RBC AUTO: 94 FL (ref 82–98)
MONOCYTES # BLD AUTO: 0.47 THOUSAND/ÂΜL (ref 0.17–1.22)
MONOCYTES NFR BLD AUTO: 6 % (ref 4–12)
NEUTROPHILS # BLD AUTO: 4.36 THOUSANDS/ÂΜL (ref 1.85–7.62)
NEUTS SEG NFR BLD AUTO: 53 % (ref 43–75)
NRBC BLD AUTO-RTO: 0 /100 WBCS
PLATELET # BLD AUTO: 302 THOUSANDS/UL (ref 149–390)
PMV BLD AUTO: 9.4 FL (ref 8.9–12.7)
PROTHROMBIN TIME: 13.2 SECONDS (ref 11.6–14.5)
RBC # BLD AUTO: 4.63 MILLION/UL (ref 3.81–5.12)
WBC # BLD AUTO: 8.14 THOUSAND/UL (ref 4.31–10.16)

## 2023-01-12 RX ORDER — CEFAZOLIN SODIUM 2 G/50ML
2000 SOLUTION INTRAVENOUS ONCE
Status: COMPLETED | OUTPATIENT
Start: 2023-01-12 | End: 2023-01-12

## 2023-01-12 RX ORDER — ACETAMINOPHEN 325 MG/1
650 TABLET ORAL EVERY 4 HOURS PRN
Status: DISCONTINUED | OUTPATIENT
Start: 2023-01-12 | End: 2023-01-13 | Stop reason: HOSPADM

## 2023-01-12 RX ORDER — MIDAZOLAM HYDROCHLORIDE 2 MG/2ML
INJECTION, SOLUTION INTRAMUSCULAR; INTRAVENOUS AS NEEDED
Status: COMPLETED | OUTPATIENT
Start: 2023-01-12 | End: 2023-01-12

## 2023-01-12 RX ORDER — FENTANYL CITRATE 50 UG/ML
INJECTION, SOLUTION INTRAMUSCULAR; INTRAVENOUS AS NEEDED
Status: COMPLETED | OUTPATIENT
Start: 2023-01-12 | End: 2023-01-12

## 2023-01-12 RX ORDER — SODIUM CHLORIDE 9 MG/ML
75 INJECTION, SOLUTION INTRAVENOUS CONTINUOUS
Status: DISCONTINUED | OUTPATIENT
Start: 2023-01-12 | End: 2023-01-13 | Stop reason: HOSPADM

## 2023-01-12 RX ADMIN — MIDAZOLAM 1 MG: 1 INJECTION INTRAMUSCULAR; INTRAVENOUS at 10:46

## 2023-01-12 RX ADMIN — FENTANYL CITRATE 50 MCG: 50 INJECTION, SOLUTION INTRAMUSCULAR; INTRAVENOUS at 10:52

## 2023-01-12 RX ADMIN — FENTANYL CITRATE 50 MCG: 50 INJECTION, SOLUTION INTRAMUSCULAR; INTRAVENOUS at 11:07

## 2023-01-12 RX ADMIN — MIDAZOLAM 1 MG: 1 INJECTION INTRAMUSCULAR; INTRAVENOUS at 10:52

## 2023-01-12 RX ADMIN — CEFAZOLIN SODIUM 2000 MG: 2 SOLUTION INTRAVENOUS at 10:19

## 2023-01-12 RX ADMIN — Medication 15 ML: at 11:05

## 2023-01-12 RX ADMIN — MIDAZOLAM 1 MG: 1 INJECTION INTRAMUSCULAR; INTRAVENOUS at 10:37

## 2023-01-12 RX ADMIN — MIDAZOLAM 1 MG: 1 INJECTION INTRAMUSCULAR; INTRAVENOUS at 11:07

## 2023-01-12 RX ADMIN — Medication 5 ML: at 11:00

## 2023-01-12 RX ADMIN — FENTANYL CITRATE 50 MCG: 50 INJECTION, SOLUTION INTRAMUSCULAR; INTRAVENOUS at 10:37

## 2023-01-12 RX ADMIN — FENTANYL CITRATE 50 MCG: 50 INJECTION, SOLUTION INTRAMUSCULAR; INTRAVENOUS at 10:46

## 2023-01-12 NOTE — BRIEF OP NOTE (RAD/CATH)
INTERVENTIONAL RADIOLOGY PROCEDURE NOTE    Date: 1/12/2023    Procedure:   Procedure Summary     Date: 01/12/23 Room / Location: [facility] Interventional Radiology    Anesthesia Start:  Anesthesia Stop:     Procedure: IR PORT PLACEMENT Diagnosis:       Carcinoma of right breast metastatic to axillary lymph node ([facility] [address] )      (Carcinoma of right breast metastatic to axillary lymph node ([facility] [address] ))    Scheduled Providers:  Responsible Provider:     Anesthesia Type: Not recorded ASA Status: Not recorded          Preoperative diagnosis:   1  Carcinoma of right breast metastatic to axillary lymph node (HCC)         Postoperative diagnosis: Same  Surgeon: Mackenzie Donahue MD     Assistant: None  No qualified resident was available  Blood loss: Minimal    Specimens: None    Findings: Left chest port placed with image guidance    Complications: None immediate      Anesthesia: conscious sedation

## 2023-01-12 NOTE — H&P
Interventional Radiology  History and Physical 1/12/2023     Grecia Jamarolga   1985   [id number]    H&P reviewed  There have been no interval changes since the time the H&P was written  /83   Pulse 81   Temp 98 4 °F (36 9 °C) (Temporal)   Resp (!) 11   Ht 5' 3" (1 6 m)   Wt 83 5 kg (184 lb)   LMP 12/25/2022 Comment: tubal ligation  SpO2 100%   BMI 32 59 kg/m²     Prior imaging was reviewed  33-year-old woman with right-sided breast cancer with axillary involvement  Presents today for chest port placement  Procedure discussed and all questions answered  Plan for left-sided port placement with sedation  Informed written consent was obtained      Marlee Pineda MD

## 2023-01-12 NOTE — DISCHARGE INSTRUCTIONS
Implanted Venous Access Port     WHAT YOU NEED TO KNOW:   An implanted venous access port is a device used to give treatments and take blood  It may also be called a central venous access device (CVAD)  The port is a small container that is placed under your skin, usually in your upper chest  The port is attached to a catheter that enters a large vein  DISCHARGE INSTRUCTIONS:   Resume your normal diet  Small sips of flat soda will help with mild nausea  Prevent an infection:   Wash your hands often  Use soap and water  Clean your hands before and after you care for your port  Remind everyone who cares for your port to wash their hands  Check your skin for infection every day  Look for redness, swelling, or fluid oozing from the port site  Care for your port:   1  You may shower beginning 48 hours after procedure  2   Leave glue in place  3  It is normal for some bruising to occur  4  Use Tylenol for pain  5  Limit use of arm on the side that your port was placed  Lift nothing heavier than 5 pounds for 1 week, and then gradually increase activity as tolerated  6  DO NOT apply ointment, lotion or cream to port site until incision is healed  Allow glue to fall off  DO NOT attempt to peel glue from skin even it it begins to flake  7  After the port incision is healed you may swim, bathe  Notify the Interventional Radiologist if you have any of the followin  Fever above 101 F    2  Increased redness or swelling after 1st day  3  Increased pain after 1st day  4  Any sign of infection (drainage from port site, skin separation, hot to touch)  5  Persistent nausea or vomiting  Contact Interventional Radiology at [phone] [facility] PATIENTS: Contact Interventional Radiology at [phone]) ([address]: Contact Interventional Radiology at [phone])         Procedural Sedation   WHAT YOU NEED TO KNOW:   Procedural sedation is medicine used during procedures to help you feel relaxed and calm  You will remember little to none of the procedure  After sedation you may feel tired, weak, or unsteady on your feet  You may also have trouble concentrating or short-term memory loss  These symptoms should go away in 24 hours or less  DISCHARGE INSTRUCTIONS:     Call 911 or have someone else call for any of the following: You have sudden trouble breathing  You cannot be woken  Contact Interventional Radiology at [phone]   [facility] PATIENTS: Contact Interventional Radiology at [phone]) Mounika Danilo PATIENTS: Contact Interventional Radiology at [phone]) if any of the following occur: You have a severe headache or dizziness  Your heart is beating faster than usual     You have a fever or chills  Your skin is itchy, swollen, or you have a rash  You have nausea or are vomiting for more than 8 hours after the procedure  You have questions or concerns about your condition or care  Self-care:   Have someone stay with you for 24 hours  This person can drive you to errands and help you do things around the house  This person can also watch for problems  Rest and do quiet activities for 24 hours  Do not exercise, ride a bike, or play sports  Stand up slowly to prevent dizziness and falls  Take short walks around the house with another person  Slowly return to your usual activities the next day  Do not drive or use dangerous machines or tools for 24 hours  You may injure yourself or others  Examples include a lawnmower, saw, or drill  Do not return to work for 24 hours if you use dangerous machines or tools for work  Do not make important decisions for 24 hours  For example, do not sign important papers or invest money  Drink liquids as directed  Liquids help flush the sedation medicine out of your body  Ask how much liquid to drink each day and which liquids are best for you  Eat small, frequent meals to prevent nausea and vomiting  Start with clear liquids such as juice or broth  If you do not vomit after clear liquids, you can eat your usual foods  Do not drink alcohol or take medicines that make you drowsy  This includes medicines that help you sleep and anxiety medicines  Ask your healthcare provider if it is safe for you to take pain medicine  Follow up with your healthcare provider as directed: Write down your questions so you remember to ask them during your visits  Procedural Sedation   WHAT YOU NEED TO KNOW:   Procedural sedation is medicine used during procedures to help you feel relaxed and calm  You will remember little to none of the procedure  After sedation you may feel tired, weak, or unsteady on your feet  You may also have trouble concentrating or short-term memory loss  These symptoms should go away in 24 hours or less  DISCHARGE INSTRUCTIONS:     Call 911 or have someone else call for any of the following: You have sudden trouble breathing  You cannot be woken  Contact Interventional Radiology at [phone]   [facility] PATIENTS: Contact Interventional Radiology at [phone]) Carlito Sensor PATIENTS: Contact Interventional Radiology at [phone]) if any of the following occur: You have a severe headache or dizziness  Your heart is beating faster than usual     You have a fever or chills  Your skin is itchy, swollen, or you have a rash  You have nausea or are vomiting for more than 8 hours after the procedure  You have questions or concerns about your condition or care  Self-care:   Have someone stay with you for 24 hours  This person can drive you to errands and help you do things around the house  This person can also watch for problems  Rest and do quiet activities for 24 hours  Do not exercise, ride a bike, or play sports  Stand up slowly to prevent dizziness and falls  Take short walks around the house with another person  Slowly return to your usual activities the next day  Do not drive or use dangerous machines or tools for 24 hours  You may injure yourself or others  Examples include a lawnmower, saw, or drill  Do not return to work for 24 hours if you use dangerous machines or tools for work  Do not make important decisions for 24 hours  For example, do not sign important papers or invest money  Drink liquids as directed  Liquids help flush the sedation medicine out of your body  Ask how much liquid to drink each day and which liquids are best for you  Eat small, frequent meals to prevent nausea and vomiting  Start with clear liquids such as juice or broth  If you do not vomit after clear liquids, you can eat your usual foods  Do not drink alcohol or take medicines that make you drowsy  This includes medicines that help you sleep and anxiety medicines  Ask your healthcare provider if it is safe for you to take pain medicine  Follow up with your healthcare provider as directed: Write down your questions so you remember to ask them during your visits

## 2023-01-12 NOTE — TELEPHONE ENCOUNTER
Call out to patient, reviewed patient did not have labs done as of 1/12/23  Patient stated that patient is getting port placed today 1/12/23  Reviewed to ask staff to obtain labs for you or to go to lab post port placement  Patient stated that patient was not aware of labs being done, reviewed that recommendation on office handout paper is for patient to have labs done 2-3 days prior to treatment  Patient agreeable

## 2023-01-13 ENCOUNTER — TELEPHONE (OUTPATIENT)
Dept: HEMATOLOGY ONCOLOGY | Facility: CLINIC | Age: 38
End: 2023-01-13

## 2023-01-13 ENCOUNTER — TELEPHONE (OUTPATIENT)
Dept: GENETICS | Facility: CLINIC | Age: 38
End: 2023-01-13

## 2023-01-13 ENCOUNTER — PATIENT OUTREACH (OUTPATIENT)
Dept: CASE MANAGEMENT | Facility: HOSPITAL | Age: 38
End: 2023-01-13

## 2023-01-13 ENCOUNTER — OFFICE VISIT (OUTPATIENT)
Dept: SURGICAL ONCOLOGY | Facility: CLINIC | Age: 38
End: 2023-01-13

## 2023-01-13 ENCOUNTER — HOSPITAL ENCOUNTER (OUTPATIENT)
Dept: INFUSION CENTER | Facility: CLINIC | Age: 38
End: 2023-01-13

## 2023-01-13 VITALS
HEIGHT: 63 IN | WEIGHT: 184 LBS | DIASTOLIC BLOOD PRESSURE: 80 MMHG | OXYGEN SATURATION: 99 % | SYSTOLIC BLOOD PRESSURE: 134 MMHG | RESPIRATION RATE: 18 BRPM | TEMPERATURE: 98.3 F | BODY MASS INDEX: 32.6 KG/M2 | HEART RATE: 101 BPM

## 2023-01-13 VITALS
SYSTOLIC BLOOD PRESSURE: 125 MMHG | DIASTOLIC BLOOD PRESSURE: 78 MMHG | HEIGHT: 64 IN | HEART RATE: 92 BPM | WEIGHT: 183.8 LBS | RESPIRATION RATE: 17 BRPM | TEMPERATURE: 97.5 F | BODY MASS INDEX: 31.38 KG/M2

## 2023-01-13 DIAGNOSIS — C77.3 CARCINOMA OF RIGHT BREAST METASTATIC TO AXILLARY LYMPH NODE (HCC): ICD-10-CM

## 2023-01-13 DIAGNOSIS — C77.3 CARCINOMA OF RIGHT BREAST METASTATIC TO AXILLARY LYMPH NODE (HCC): Primary | ICD-10-CM

## 2023-01-13 DIAGNOSIS — C50.919 TRIPLE NEGATIVE BREAST CANCER (HCC): Primary | ICD-10-CM

## 2023-01-13 DIAGNOSIS — C50.911 CARCINOMA OF RIGHT BREAST METASTATIC TO AXILLARY LYMPH NODE (HCC): ICD-10-CM

## 2023-01-13 DIAGNOSIS — C50.911 CARCINOMA OF RIGHT BREAST METASTATIC TO AXILLARY LYMPH NODE (HCC): Primary | ICD-10-CM

## 2023-01-13 LAB
ALBUMIN SERPL BCP-MCNC: 3.7 G/DL (ref 3.5–5)
ALP SERPL-CCNC: 74 U/L (ref 46–116)
ALT SERPL W P-5'-P-CCNC: 23 U/L (ref 12–78)
ANION GAP SERPL CALCULATED.3IONS-SCNC: 12 MMOL/L (ref 4–13)
AST SERPL W P-5'-P-CCNC: 15 U/L (ref 5–45)
BILIRUB SERPL-MCNC: 0.34 MG/DL (ref 0.2–1)
BUN SERPL-MCNC: 10 MG/DL (ref 5–25)
CALCIUM SERPL-MCNC: 8.9 MG/DL (ref 8.3–10.1)
CHLORIDE SERPL-SCNC: 103 MMOL/L (ref 96–108)
CO2 SERPL-SCNC: 22 MMOL/L (ref 21–32)
CREAT SERPL-MCNC: 0.57 MG/DL (ref 0.6–1.3)
GFR SERPL CREATININE-BSD FRML MDRD: 117 ML/MIN/1.73SQ M
GLUCOSE SERPL-MCNC: 99 MG/DL (ref 65–140)
POTASSIUM SERPL-SCNC: 3.6 MMOL/L (ref 3.5–5.3)
PROT SERPL-MCNC: 7.1 G/DL (ref 6.4–8.4)
SODIUM SERPL-SCNC: 137 MMOL/L (ref 135–147)
TSH SERPL DL<=0.05 MIU/L-ACNC: 0.68 UIU/ML (ref 0.45–4.5)

## 2023-01-13 RX ORDER — SODIUM CHLORIDE 9 MG/ML
20 INJECTION, SOLUTION INTRAVENOUS ONCE
Status: COMPLETED | OUTPATIENT
Start: 2023-01-13 | End: 2023-01-13

## 2023-01-13 RX ADMIN — DEXAMETHASONE SODIUM PHOSPHATE: 10 INJECTION, SOLUTION INTRAMUSCULAR; INTRAVENOUS at 12:10

## 2023-01-13 RX ADMIN — FAMOTIDINE 20 MG: 10 INJECTION, SOLUTION INTRAVENOUS at 13:04

## 2023-01-13 RX ADMIN — PACLITAXEL 151.2 MG: 6 INJECTION, SOLUTION, CONCENTRATE INTRAVENOUS at 14:29

## 2023-01-13 RX ADMIN — SODIUM CHLORIDE 200 MG: 9 INJECTION, SOLUTION INTRAVENOUS at 13:43

## 2023-01-13 RX ADMIN — SODIUM CHLORIDE 20 ML/HR: 0.9 INJECTION, SOLUTION INTRAVENOUS at 12:07

## 2023-01-13 RX ADMIN — DIPHENHYDRAMINE HYDROCHLORIDE 25 MG: 50 INJECTION, SOLUTION INTRAMUSCULAR; INTRAVENOUS at 12:34

## 2023-01-13 RX ADMIN — CARBOPLATIN 201.9 MG: 10 INJECTION, SOLUTION INTRAVENOUS at 15:36

## 2023-01-13 NOTE — TELEPHONE ENCOUNTER
Genetic Test Result Note:    Summary:    Result Disclosure: Today I spoke with [place] over the phone to review the results of her genetic test for hereditary cancer  She underwent genetic testing through our program on 12/27/2022 due to her recent diagnosis of breast cancer  Her results will be sent to her breast surgeon Jose Ramon Hernandez MD     A separate report of her STAT genetic test results were disclosed to her on 1/9/2023 by Coty Lepe, Medical Assistant  This result includes new genes and does not change her initial genetic test result  Test Performed: [employer] BRCAplus STAT Panel (8 genes): FRANKO, BRCA1, BRCA2, CDH1, CHEK2, PALB2, PTEN, TP53 with reflex to [employer] CancerNext (28 additional genes): APC, AXIN2 BARD1, BRIP1, BMPR1A, CDK4, CDKN2A, DICER1, EPCAM, GREM1, HOXB13, MLH1, MSH2, MSH3, MSH6, MUTYH, NBN, NF1, NTHL1, PMS2, POLD1, POLE, RAD51C, RAD51D, RECQL SMAD4, SMARCA4, STK11     Result: Negative - No Clinically Significant Variants Detected     Assessment:   A negative result significantly reduces the likelihood that [place] has a hereditary cancer syndrome  However, this testing is unable to completely rule out the presence of hereditary cancer  It remains possible that:  - There is a variant in an area of a gene which was not tested or there is a variant not detectable due to technical limitations of this test      - There is a variant in another gene that was not included in this test or in a gene not known to be linked to cancer or tumors  - A family member has a genetic variant that the patient did not inherit  - The cancer in the family is sporadic and is related to non-hereditary factors  Risks and Testing for Family Members:  [place] was made aware that all of her first-degree relatives are at increased risk to develop breast cancer based on her recent diagnosis and family history of breast cancer   We recommend that her first-degree relatives make their healthcare providers aware of the family history and discuss their options for screening and risk-reduction  At this time we do not recommend testing for [place] 's children based on her negative test result  [place] 's children still need to consider the history of cancer on the other side of their family when determining their risks  If [place] has any affected family members with a cancer diagnosis, especially at a young age, they may still consider genetic testing  Relatives who wish to pursue genetic testing can reach out to the [facility] [address] ([id number]) to schedule an appointment or visit www [facility] org to identify a local genetic counselor  Plan:     Negative Result: This result does not have surgical implications and surgical options should be discussed with her healthcare provider   [place]'s breast surgeon,   [address] will be made aware of her results

## 2023-01-13 NOTE — PROGRESS NOTES
Title: medication time out    Date patient scheduled: 1/13/23    Original medication ordered: Carboplatin 234mg    New Medication ordered: Carboplatin 201 9mg with SCr of 0 72 added into Carboplatin for 1/13/23  Is the patient scheduled within 24 hours? ? If yes, follow up with verbal telephone call  Spoke with Cinda Angulo RN at [address] [place] [address] center on 1/13/23 at  am     Office RN to route to [facility]  Infusion  pool routes to [facility]  Infusion tech to receive message, confirm scheduled treatment duration matches ordered treatment duration or adjust accordingly, and re-link appointment request orders  Infusion tech to notify pharmacy and finance

## 2023-01-13 NOTE — PROGRESS NOTES
Message rec'd from Dr Venkata Guo's office, regarding recommendation for Left ultrasound with possible;    _____ RIGHT ___X___LEFT      __X___Ultrasound guided  ______Stereotactic breast biopsy      Procedure was explained to patient by Dr Dwight Hopson    Blood thinners: No: __X___ Yes: _____ What:     Biopsy teaching sheet given:  _____yes __X__no (All teaching points discussed during visit with patient in infusion, pt questions answered during office visit, pt adv to arrive at 1300 for ultrasound followed by biopsy at 1400 if needed)    Pt given name/# for any further questions/needs

## 2023-01-13 NOTE — PROGRESS NOTES
Per Dr Margot Bell, patient's CMP to be utilized for treatment on 1/13/23 Carboplatin dose  Please obtain new CMP and TSH prior to treatment and proceed without results

## 2023-01-13 NOTE — TELEPHONE ENCOUNTER
Title: medication time out    Date patient scheduled: 1/13/23    Original medication ordered: Carboplatin 234mg    New Medication ordered: Carboplatin 201 9mg with SCr of 0 72 added into Carboplatin for 1/13/23  Is the patient scheduled within 24 hours? ? If yes, follow up with verbal telephone call  Spoke with Maria D Palmer, RN at [address] [place] [address] center on 1/13/23 at  am     Office RN to route to [facility]  Infusion  pool routes to [facility]  Infusion tech to receive message, confirm scheduled treatment duration matches ordered treatment duration or adjust accordingly, and re-link appointment request orders  Infusion tech to notify pharmacy and finance

## 2023-01-13 NOTE — PROGRESS NOTES
Biopsychosocial and Barriers Assessment    Cancer Diagnosis: breast, stage IIIB  Home/Cell Phone: [phone]  Emergency Contact: Geoff Hale  Marital Status: single  Interpretation concerns, speaks another language, preferred language: speaks [place]  Cultural concerns: none noted  Ability to read or write: independent    Caregiver/Support: Mika KOENIG  Children: 5 children: ages 21, 23 (both live in [place]), 15, 15, and 15 (live at home with her)  Child/Elder care: teenage children at home, all aware of her dx  Housing: lives local to [address] [place] [address] Road:   Lives With: SO and teenage children  Daily Living Activities: independent  Durable Medical Equipment: none  Ambulation: independent    Preferred Pharmacy:   High co-pays with insurance: [employer], no concerns  High co-pays with medication coverage: No concerns  No medication coverage: NA    Primary Care Provider: Zay DAVILA  Hx of [address]: none  Hx of Short term rehab: none  Mental Health Hx:   Substance Abuse Hx:   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 [email or web address]  [email or web address] Status/Location:  Ability to pay bills: not a concern  POA/LW/AD:  Transportation Plan/Concerns: drives self, SO will assist as needed  What do you know about your Cancer Diagnosis    What has your doctor told you about your cancer diagnosis:    What has your doctor told you about your cancer treatment:    What specific concerns do you have about your diagnosis and treatment:    Have you been made aware of any hair loss associated with treatment:    Additional Comments:      LILI met with pt in the infusion center today, she is here for her first day of tx  She was not previously referred so we completed a DT and the above assessment today  She tells me that her three teenage children are aware of her diagnosis and handled it well, but notes that "I can't keep them off google"    Her two older children live in [place] but are involved and supportive  She is here with her SO today, 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

## 2023-01-13 NOTE — PROGRESS NOTES
Per Dr Irvin Han, patient's CMP from 12/29/22 to be utilized for treatment on 1/13/23 Carboplatin dose       Please obtain new CMP and TSH prior to treatment and proceed without results

## 2023-01-13 NOTE — PROGRESS NOTES
Surgical Oncology Follow Up  Angelita Madrigal [facility]  [facility]  [address]  [facility] [address]  1985  [id number]      Chief Complaint   Patient presents with   • Follow-up     2 wk f/u 1/5 MRI SEE IMAGING 1/4 bone scan clear 12/29 Ct cap small lung nodules  12/22/22 Right breast USG bx A 2:00 6cmfn IDC G2 LVI not identifiedER 0 KS 0 HER2 0 Ki-67 50% has akanksha B 4:00 6CMFN IDC ER 0 KS 0 HER2 2+ FISH pending Ki-67 40% G2 LVI not identified has akanksha c Right axillary LN mets consistent w/ primary has akanksha GENETICS NEG  malignancy appears multifocal  malignancy spans 5 8cm extent of disease may be greater; 12/15/22 dx b/l mammo fam hx of breast ca in mat great aunt/pat aunt          Assessment & Plan:     Is here for follow-up with a triple negative breast cancer multi focal with metastasis to right axillary lymph node in the process of neoadjuvant chemotherapy  She was recommended to have an MRI which she had an MRI of the breast   This was read as left breast BI-RADS 4 and recommended ultrasound-guided biopsy  She also had her port placed yesterday she complains of some discomfort  However clinically there is no worrisome features such as hematoma or swelling  She is scheduled for chemotherapy first cycle today  I did discuss in detail with her about her MRI findings and recommended a biopsy of the left breast ultrasound-guided  She understands she will go for the biopsy and come back and see us  All patient's questions answered to her satisfaction    Cancer History:     Oncology History   Malignant neoplasm of overlapping sites of right female breast [facility]) (Resolved)   12/29/2022 Initial Diagnosis    Malignant neoplasm of overlapping sites of right female breast [facility])     Carcinoma of right breast metastatic to axillary lymph node ([address] )   12/22/2022 -  Cancer Staged    Staging form: Breast, AJCC 8th Edition  - Clinical stage from 12/22/2022: Stage IIIB (cT3, cN1, cM0, G2, ER-, VT-, HER2-) - Signed by Bridgette Chen MD on 1/4/2023  Stage prefix: Initial diagnosis  Nuclear grade: G3  Mitotic count score: Score 1  Histologic grading system: 3 grade system  Menopausal status: Premenopausal       12/22/2022 Initial Diagnosis    Triple negative breast cancer ([facility] )     12/22/2022 Biopsy    Right breast ultrasound guided biopsy  A  2 o'clock, 6cm from nipple  Invasive mammary carcinoma of no special type (ductal NST/invasive ductal carcinoma)   Grade 2  ER 0  VT 0   HER2 0  Ki-67 50%    B  4 o'clock, 6cm from nipple  Invasive mammary carcinoma of no special type (ductal NST/invasive ductal carcinoma)   Grade 2   ER 0   VT 0   HER2 2+   FISH pending  Ki-67 40%     C  Right Axillary lymph node  Metastatic carcinoma consistent with mammary primary     12/29/2022 Initial Diagnosis    Carcinoma of right breast metastatic to axillary lymph node ([place] [address] )     12/30/2022 Genetic Testing    [facility]  A total of 36 genes were evaluated, including: FRANKO, BRCA1, BRCA2, CDH1, CHEK2, PALB2, PTEN, STK11, TP53  Negative result   No pathogenic sequence variants or deletions/duplications identified       1/13/2023 -  Chemotherapy    DOXOrubicin (ADRIAMYCIN), 60 mg/m2, Intravenous, Once, 0 of 4 cycles  cyclophosphamide (CYTOXAN) IVPB, 600 mg/m2, Intravenous, Once, 0 of 4 cycles  CARBOplatin (PARAPLATIN) IVPB (GOG AUC DOSING), 243 mg, Intravenous, Once, 0 of 4 cycles  PACLItaxel (TAXOL) chemo IVPB, 80 mg/m2 = 149 4 mg, Intravenous, Once, 0 of 4 cycles  aprepitant (CINVANTI) in  mL IVPB, 130 mg, Intravenous, Once, 0 of 4 cycles  pembrolizumab (KEYTRUDA) IVPB, 200 mg, Intravenous, Once, 0 of 17 cycles     Triple negative breast cancer ([facility] )   12/22/2022 Initial Diagnosis    Triple negative breast cancer ([facility] )     12/22/2022 Biopsy    Right breast ultrasound guided biopsy  A  2 o'clock, 6cm from nipple  Invasive mammary carcinoma of no special type (ductal NST/invasive ductal carcinoma)   Grade 2  ER 0  LA 0   HER2 0  Ki-67 50%    B  4 o'clock, 6cm from nipple  Invasive mammary carcinoma of no special type (ductal NST/invasive ductal carcinoma)   Grade 2   ER 0   LA 0   HER2 2+   FISH pending  Ki-67 40%     C  Right Axillary lymph node  Metastatic carcinoma consistent with mammary primary     2022 Genetic Testing    [facility]  A total of 36 genes were evaluated, including: FRANKO, BRCA1, BRCA2, CDH1, CHEK2, PALB2, PTEN, STK11, TP53  Negative result  No pathogenic sequence variants or deletions/duplications identified             Interval History:   Follow-up after MRI with recent diagnosis of right breast cancer with axillary metastatic disease  Review of Systems:   Review of Systems   Constitutional: Negative for chills and fever  HENT: Negative for ear pain and sore throat  Eyes: Negative for pain and visual disturbance  Respiratory: Negative for cough and shortness of breath  Cardiovascular: Negative for chest pain and palpitations  Gastrointestinal: Negative for abdominal pain and vomiting  Genitourinary: Negative for dysuria and hematuria  Musculoskeletal: Negative for arthralgias and back pain  Skin: Negative for color change and rash  Neurological: Negative for seizures and syncope  All other systems reviewed and are negative        Past Medical History     Patient Active Problem List   Diagnosis   • Hypertension   • Asthma   • Cervical radiculopathy   • Chronic bilateral low back pain   • DDD (degenerative disc disease), lumbar   • Binge eating disorder   • BMI 33 0-33 9,adult   • Pre-diabetes   • Cigarette nicotine dependence without complication   • Carcinoma of right breast metastatic to axillary lymph node (HCC)   • Triple negative breast cancer [facility])     Past Medical History:   Diagnosis Date   • Asthma    • Hypertension      Past Surgical History:   Procedure Laterality Date   •  SECTION     • IR PORT PLACEMENT  2023   • TUBAL LIGATION     • US GUIDANCE BREAST BIOPSY RIGHT EACH ADDITIONAL Right 12/22/2022   • US GUIDED BREAST BIOPSY RIGHT COMPLETE Right 12/22/2022   • US GUIDED BREAST LYMPH NODE BIOPSY RIGHT Right 12/22/2022     Family History   Problem Relation Age of Onset   • Lung cancer Mother 61   • Ovarian cancer Mother    • No Known Problems Daughter    • No Known Problems Daughter    • Skin cancer Maternal Grandmother    • Ovarian cancer Maternal Grandmother    • Prostate cancer Maternal Grandfather    • Breast cancer Maternal Aunt    • Ovarian cancer Maternal Aunt    • Breast cancer Paternal Aunt      Social History     Socioeconomic History   • Marital status: Single     Spouse name: Not on file   • Number of children: Not on file   • Years of education: Not on file   • Highest education level: Not on file   Occupational History   • Not on file   Tobacco Use   • Smoking status: Every Day     Packs/day: 0 50     Years: 15 00     Pack years: 7 50     Types: Cigarettes   • Smokeless tobacco: Never   Vaping Use   • Vaping Use: Never used   Substance and Sexual Activity   • Alcohol use: Not Currently   • Drug use: Never   • Sexual activity: Yes     Partners: Male     Birth control/protection: Female Sterilization   Other Topics Concern   • Not on file   Social History Narrative   • Not on file     Social Determinants of Health     Financial Resource Strain: Not on file   Food Insecurity: Not on file   Transportation Needs: Not on file   Physical Activity: Not on file   Stress: Not on file   Social Connections: Not on file   Intimate Partner Violence: Not on file   Housing Stability: Not on file       Current Outpatient Medications:   •  albuterol (PROVENTIL HFA,VENTOLIN HFA) 90 mcg/act inhaler, inhale 2 puffs by mouth and INTO THE LUNGS every 6 hours if needed for wheezing, Disp: 18 g, Rfl: 11  •  amLODIPine (NORVASC) 5 mg tablet, Take 2 tablets (10 mg total) by mouth daily, Disp: 60 tablet, Rfl: 2  •  fexofenadine (ALLEGRA) 180 MG tablet, Take 1 tablet (180 mg total) by mouth daily, Disp: 30 tablet, Rfl: 5  •  lidocaine-prilocaine (EMLA) cream, Apply topically as needed for mild pain, Disp: 30 g, Rfl: 0  •  montelukast (SINGULAIR) 10 mg tablet, take 1 tablet by mouth at bedtime, Disp: 30 tablet, Rfl: 5  •  ondansetron (ZOFRAN-ODT) 8 mg disintegrating tablet, Take 1 tablet (8 mg total) by mouth every 8 (eight) hours as needed for nausea or vomiting, Disp: 20 tablet, Rfl: 0  •  Symbicort 160-4 5 MCG/ACT inhaler, inhale 2 puffs by mouth twice a day Rinse mouth after use, Disp: 10 2 g, Rfl: 5  No current facility-administered medications for this visit  No Known Allergies    Physical Exam:     Vitals:    01/13/23 0959   BP: 134/80   Pulse: 101   Resp: 18   Temp: 98 3 °F (36 8 °C)   SpO2: 99%     Physical Exam  Constitutional:       Appearance: Normal appearance  HENT:      Head: Normocephalic and atraumatic  Nose: Nose normal       Mouth/Throat:      Mouth: Mucous membranes are moist    Eyes:      Pupils: Pupils are equal, round, and reactive to light  Cardiovascular:      Rate and Rhythm: Normal rate  Pulses: Normal pulses  Heart sounds: Normal heart sounds  Pulmonary:      Effort: Pulmonary effort is normal       Breath sounds: Normal breath sounds  Chest:          Comments: Left chest wall port is in place no swelling no hematoma  Tender to touch the tunneling site I did explain to her this probably from the tunneling otherwise there is no worrisome features  Bilateral breast examination no new findings and known right breast cancer  Abdominal:      General: Bowel sounds are normal       Palpations: Abdomen is soft  Musculoskeletal:         General: Normal range of motion  Cervical back: Normal range of motion and neck supple  Skin:     General: Skin is warm  Neurological:      General: No focal deficit present  Mental Status: She is alert and oriented to person, place, and time     Psychiatric: Mood and Affect: Mood normal          Behavior: Behavior normal          Thought Content: Thought content normal          Judgment: Judgment normal            Results & Discussion:   I did review breast MRI and recommended a left breast ultrasound-guided biopsy  She understand and agree to undergo the biopsy and follow-up  she understands and  agrees   All patient questions were answered  Advance Care Planning/Advance Directives: Jewel Gooden MD discussed the disease status with Rajendra Ahmadi  today 01/13/23  treatment plans and follow-up with the patient

## 2023-01-13 NOTE — PROGRESS NOTES
Pt reports to unit feeling well, offering no complaints today  Patient receiving fist Keytruda, Carboplatin and Taxol through L CW port, tolerated infusions well without any adverse events, positive blood return throughout  Patient currently receiving Carboplatin with no issues, report given to [facility] RN

## 2023-01-16 ENCOUNTER — PATIENT OUTREACH (OUTPATIENT)
Dept: HEMATOLOGY ONCOLOGY | Facility: CLINIC | Age: 38
End: 2023-01-16

## 2023-01-16 DIAGNOSIS — C50.919 TRIPLE NEGATIVE BREAST CANCER (HCC): Primary | ICD-10-CM

## 2023-01-16 NOTE — PROGRESS NOTES
Breast Oncology Nurse Navigator    Reached out to patient via phone after she sent a [employer] message to [facility] Madeline Tuttle asking for a prescription for Ensure  She said she had a prescription for Ensure in past with a different issue  Was told by surgical oncology staff to drink Ensure  Denies nausea and vomiting but states she has been drinking Ensure for most of her calorie intake  States it is too expensive and would like to have a prescription for it  Completed MST and will route this to the dietician  Will also route to medical and surgical oncology to see if she can get a prescription  She did fill her prescription for po Zofran and has it on hand in case of N/V  States she tolerated her first chemo infusion well  Patient also asking for a prescription for a wig  Will request from medical oncology  Made her aware that I could assist her in obtaining a free wig if she in interested  Patient continues to smoke cigarettes but she is interested in quitting  Referral placed to Smoking Cessation program     Patient has my contact information and knows she can reach out via phone or [employer] with further questions/concerns

## 2023-01-17 ENCOUNTER — TELEPHONE (OUTPATIENT)
Dept: HEMATOLOGY ONCOLOGY | Facility: CLINIC | Age: 38
End: 2023-01-17

## 2023-01-17 ENCOUNTER — HOSPITAL ENCOUNTER (OUTPATIENT)
Dept: ULTRASOUND IMAGING | Facility: CLINIC | Age: 38
Discharge: HOME | End: 2023-01-17

## 2023-01-17 ENCOUNTER — TELEPHONE (OUTPATIENT)
Dept: NUTRITION | Facility: CLINIC | Age: 38
End: 2023-01-17

## 2023-01-17 ENCOUNTER — HOSPITAL ENCOUNTER (OUTPATIENT)
Dept: INFUSION CENTER | Facility: CLINIC | Age: 38
Discharge: HOME | End: 2023-01-17

## 2023-01-17 DIAGNOSIS — Z95.828 PORT-A-CATH IN PLACE: ICD-10-CM

## 2023-01-17 DIAGNOSIS — C50.911 CARCINOMA OF RIGHT BREAST METASTATIC TO AXILLARY LYMPH NODE (HCC): ICD-10-CM

## 2023-01-17 DIAGNOSIS — C77.3 CARCINOMA OF RIGHT BREAST METASTATIC TO AXILLARY LYMPH NODE (HCC): Primary | ICD-10-CM

## 2023-01-17 DIAGNOSIS — C50.919 TRIPLE NEGATIVE BREAST CANCER (HCC): Primary | ICD-10-CM

## 2023-01-17 DIAGNOSIS — C50.911 CARCINOMA OF RIGHT BREAST METASTATIC TO AXILLARY LYMPH NODE (HCC): Primary | ICD-10-CM

## 2023-01-17 DIAGNOSIS — C77.3 CARCINOMA OF RIGHT BREAST METASTATIC TO AXILLARY LYMPH NODE (HCC): ICD-10-CM

## 2023-01-17 DIAGNOSIS — R92.8 ABNORMAL MRI, BREAST: ICD-10-CM

## 2023-01-17 DIAGNOSIS — C50.919 TRIPLE NEGATIVE BREAST CANCER (HCC): ICD-10-CM

## 2023-01-17 LAB
ALBUMIN SERPL BCP-MCNC: 3.4 G/DL (ref 3.5–5)
ALP SERPL-CCNC: 70 U/L (ref 46–116)
ALT SERPL W P-5'-P-CCNC: 27 U/L (ref 12–78)
ANION GAP SERPL CALCULATED.3IONS-SCNC: 10 MMOL/L (ref 4–13)
AST SERPL W P-5'-P-CCNC: 17 U/L (ref 5–45)
BASOPHILS # BLD AUTO: 0.04 THOUSANDS/ÂΜL (ref 0–0.1)
BASOPHILS NFR BLD AUTO: 1 % (ref 0–1)
BILIRUB SERPL-MCNC: 0.22 MG/DL (ref 0.2–1)
BUN SERPL-MCNC: 11 MG/DL (ref 5–25)
CALCIUM ALBUM COR SERPL-MCNC: 9.3 MG/DL (ref 8.3–10.1)
CALCIUM SERPL-MCNC: 8.8 MG/DL (ref 8.3–10.1)
CHLORIDE SERPL-SCNC: 105 MMOL/L (ref 96–108)
CO2 SERPL-SCNC: 26 MMOL/L (ref 21–32)
CREAT SERPL-MCNC: 0.54 MG/DL (ref 0.6–1.3)
EOSINOPHIL # BLD AUTO: 0.25 THOUSAND/ÂΜL (ref 0–0.61)
EOSINOPHIL NFR BLD AUTO: 4 % (ref 0–6)
ERYTHROCYTE [DISTWIDTH] IN BLOOD BY AUTOMATED COUNT: 12.1 % (ref 11.6–15.1)
GFR SERPL CREATININE-BSD FRML MDRD: 119 ML/MIN/1.73SQ M
GLUCOSE SERPL-MCNC: 144 MG/DL (ref 65–140)
HCT VFR BLD AUTO: 36.9 % (ref 34.8–46.1)
HGB BLD-MCNC: 12.8 G/DL (ref 11.5–15.4)
IMM GRANULOCYTES # BLD AUTO: 0.02 THOUSAND/UL (ref 0–0.2)
IMM GRANULOCYTES NFR BLD AUTO: 0 % (ref 0–2)
LYMPHOCYTES # BLD AUTO: 2.4 THOUSANDS/ÂΜL (ref 0.6–4.47)
LYMPHOCYTES NFR BLD AUTO: 42 % (ref 14–44)
MCH RBC QN AUTO: 31.2 PG (ref 26.8–34.3)
MCHC RBC AUTO-ENTMCNC: 34.7 G/DL (ref 31.4–37.4)
MCV RBC AUTO: 90 FL (ref 82–98)
MONOCYTES # BLD AUTO: 0.19 THOUSAND/ÂΜL (ref 0.17–1.22)
MONOCYTES NFR BLD AUTO: 3 % (ref 4–12)
NEUTROPHILS # BLD AUTO: 2.83 THOUSANDS/ÂΜL (ref 1.85–7.62)
NEUTS SEG NFR BLD AUTO: 50 % (ref 43–75)
NRBC BLD AUTO-RTO: 0 /100 WBCS
PLATELET # BLD AUTO: 245 THOUSANDS/UL (ref 149–390)
PMV BLD AUTO: 10.3 FL (ref 8.9–12.7)
POTASSIUM SERPL-SCNC: 3.8 MMOL/L (ref 3.5–5.3)
PROT SERPL-MCNC: 6.7 G/DL (ref 6.4–8.4)
RBC # BLD AUTO: 4.1 MILLION/UL (ref 3.81–5.12)
SODIUM SERPL-SCNC: 141 MMOL/L (ref 135–147)
WBC # BLD AUTO: 5.73 THOUSAND/UL (ref 4.31–10.16)

## 2023-01-17 NOTE — DISCHARGE INSTR - OTHER ORDERS
POST LARGE CORE BREAST BIOPSY PATIENT INFORMATION      Place an ice pack inside your bra over the top of the dressing every hour for 20 minutes (20 minutes on, 60 minutes off)  Do this until bedtime  Do not shower or bathe until the following morning  After bathing, you may remove the bandaid  You may bathe your breast carefully with the steri-strips in place  Be careful not to loosen them  The steri-strips will fall off in 3-5 days  You may have mild discomfort, and you may have some bruising where the needle entered the skin  This should clear within 5-7 days  If you need medicine for discomfort, take acetaminophen products such as Tylenol  You may also take Advil or Motrin products  DO NOT use Aspirin for the first 24 hours  Do not participate in strenuous activities such as-tennis, aerobics, weight lifting, etc  for 24 hours  Refrain from swimming/soaking for 72 hours  Wearing a bra for sleeping may be more comfortable for the first 24-48 hours after your biopsy  Watch for continued bleeding, pain or fever over 101  If any of these symptoms occur, please contact our breast nurse navigator at the location where your biopsy was performed  During normal business hours (7:30am - 4:00pm) please call the nurse navigator at the site     where your procedure was performed:       [address]: [phone] or [phone] [address]: [phone] or [phone]     [address]: [address] [facility]: [address]: [phone]        After 4pm - please call your physician or go to the nearest Emergency Department location  The final results of your biopsy are usually available within one week

## 2023-01-17 NOTE — TELEPHONE ENCOUNTER
Please see encounter from nurse for scheduling details  Thanks!
Please see if patient is able to have treatment Friday 1/20/23 afternoon for stat MRI biopsy on Friday 1/20/23 in the morning Per Danette Strong with Surgical Oncolgoy
Spoke with patient  She is aware of schedule update 
,DirectAddress_Unknown,[email or web address]

## 2023-01-17 NOTE — TELEPHONE ENCOUNTER
Contacted [place] to set up nutrition consult after receiving notification by RN Navigator Maciej Wilkinson on 1/16/23 that pt is appropriate for oncology nutrition care (reason for referral: pt requesting prescription for Ensure)  Discussed oncology nutrition services available (options for in-person and phone consultation) and the benefits of meeting for a consultation   Initial oncology nutrition phone consultation set up for tomorrow 1/18/23 1pm

## 2023-01-17 NOTE — PROGRESS NOTES
Patient can stop by the office or while she is at her appointment with Akiko Alegria PA-C on 1/26/23 a prescription for a cranial prosthesis can be given to patient

## 2023-01-17 NOTE — PROGRESS NOTES
Denise Encarnacion,  I already put in a referral to the dietician  I can help with the prescription    Thanks,  Tyson Dockery

## 2023-01-18 ENCOUNTER — NUTRITION (OUTPATIENT)
Dept: NUTRITION | Facility: CLINIC | Age: 38
End: 2023-01-18

## 2023-01-18 DIAGNOSIS — Z71.3 NUTRITIONAL COUNSELING: Primary | ICD-10-CM

## 2023-01-18 NOTE — PROGRESS NOTES
Oral nutrition supplement order and supporting documents faxed to [facility] today 1/18/23 at 2:10pm     Fazal Cordoba and let her know that order was sent to [facility]

## 2023-01-18 NOTE — PATIENT INSTRUCTIONS
Nutrition Rx & Recommendations:  Diet: High Calorie, High Protein (for high calorie foods see pages 52-53, and for high protein foods see pages 49-51 in your Eating Hints book)  Include a variety of foods (as tolerated/allowed by diet)  Incorporate physical activity as able/allowed  Stay hydrated by sipping fluids of choice/tolerance throughout the day  Alter food choices and eating patterns to accommodate changing needs  Refer to Eating Hints book for other meal/snack ideas and symptom management  Weigh yourself regularly  If you notice weight loss, make an effort to increase your daily food/calorie intake  If you continue to notice loss after these efforts, reach out to your dietitian to establish a plan to stabilize weight  Ways to increase calorie and protein intake:   Eat when feeling most hungry  Choose foods that are easy to eat: yogurt, oatmeal, eggs, soup, cereal, muffins, granola bars  Keep non perishable snacks nearby (pretzels, crackers)    5-6 small meals/snacks daily  Protein at all meals/snacks: : eggs (scrambled, hard boiled, pan fried, egg whites), fish (salmon, tuna steal, swordfish, frannie, shrimp, cod, jihan), beans (chickpeas, black beans, lentils), nuts/nut butters, quinoa, peas, oatmeal, seeds (omega, flax, pumpkin), soy milk, cheese, Greek yogurt, chicken, lean ground beef, lean cuts of beef (loin, round, flank), turkey breast    Add high calorie foods to meals: cheese, milk, olive oil, avocado, butter, peanut butter, extra sauces/gravies, creamy salad dressings    Choose liquids with calories: whole milk, Fairlife milk (higher protein/lactose-free milk), chocolate milk, 100% fruit juice, diluted juice, bone broth (higher protein broth), creamy soups, sports drinks (Gatorade, Poweraide, Pedialyte, etc ), [place] ice, popsicles, milkshakes, smoothies, oral nutrition supplements (Ensure, Boost, etc ), gelatin/Jello, etc   Use oral nutrition supplements to make homemade smoothies or milkshakes  Continue Ensure 2x daily       Follow Up Plan: During infusion 1/27/23   Recommend Referral to Other Providers: none at this time

## 2023-01-18 NOTE — PRE-PROCEDURE INSTRUCTIONS
Signed               Pt scheduled for;    Left breast MRI guided biopsy x1 on Friday 1/20/23  Questions and concerns were addressed at this time  Patient verbalized understanding  Allergies & medications reviewed with pt & verified in Epic   pre-procedure instructions given       Blood thinners:   _x__ NO  ___ YES/medication: ___ (no need to stop per RBC protocol)          -INR ____ (if pt on Coumadin/Warfarin)

## 2023-01-20 ENCOUNTER — HOSPITAL ENCOUNTER (OUTPATIENT)
Dept: RADIOLOGY | Facility: HOSPITAL | Age: 38
Discharge: HOME | End: 2023-01-20
Attending: SURGERY

## 2023-01-20 ENCOUNTER — HOSPITAL ENCOUNTER (OUTPATIENT)
Dept: INFUSION CENTER | Facility: CLINIC | Age: 38
Discharge: HOME | End: 2023-01-20

## 2023-01-20 VITALS
TEMPERATURE: 97.5 F | SYSTOLIC BLOOD PRESSURE: 126 MMHG | WEIGHT: 184.4 LBS | HEART RATE: 104 BPM | RESPIRATION RATE: 20 BRPM | HEIGHT: 64 IN | DIASTOLIC BLOOD PRESSURE: 71 MMHG | BODY MASS INDEX: 31.48 KG/M2 | OXYGEN SATURATION: 100 %

## 2023-01-20 DIAGNOSIS — C77.3 CARCINOMA OF RIGHT BREAST METASTATIC TO AXILLARY LYMPH NODE (HCC): Primary | ICD-10-CM

## 2023-01-20 DIAGNOSIS — C50.911 CARCINOMA OF RIGHT BREAST METASTATIC TO AXILLARY LYMPH NODE (HCC): Primary | ICD-10-CM

## 2023-01-20 DIAGNOSIS — C50.919 TRIPLE NEGATIVE BREAST CANCER (HCC): ICD-10-CM

## 2023-01-20 RX ORDER — FAMOTIDINE 10 MG/ML
20 INJECTION, SOLUTION INTRAVENOUS EVERY 12 HOURS SCHEDULED
Status: DISCONTINUED | OUTPATIENT
Start: 2023-01-20 | End: 2023-01-23 | Stop reason: HOSPADM

## 2023-01-20 RX ORDER — FAMOTIDINE 10 MG/ML
INJECTION, SOLUTION INTRAVENOUS
Status: COMPLETED
Start: 2023-01-20 | End: 2023-01-20

## 2023-01-20 RX ORDER — LIDOCAINE HYDROCHLORIDE AND EPINEPHRINE 20; 5 MG/ML; UG/ML
1 INJECTION, SOLUTION EPIDURAL; INFILTRATION; INTRACAUDAL; PERINEURAL ONCE
Status: COMPLETED | OUTPATIENT
Start: 2023-01-20 | End: 2023-01-20

## 2023-01-20 RX ORDER — DIPHENHYDRAMINE HYDROCHLORIDE 50 MG/ML
50 INJECTION INTRAMUSCULAR; INTRAVENOUS ONCE
Status: COMPLETED | OUTPATIENT
Start: 2023-01-20 | End: 2023-01-20

## 2023-01-20 RX ORDER — ALBUTEROL SULFATE 2.5 MG/3ML
2.5 SOLUTION RESPIRATORY (INHALATION) ONCE
Status: DISCONTINUED | OUTPATIENT
Start: 2023-01-20 | End: 2023-01-23 | Stop reason: HOSPADM

## 2023-01-20 RX ORDER — SODIUM CHLORIDE 9 MG/ML
20 INJECTION, SOLUTION INTRAVENOUS ONCE
Status: COMPLETED | OUTPATIENT
Start: 2023-01-20 | End: 2023-01-20

## 2023-01-20 RX ADMIN — FAMOTIDINE 20 MG: 10 INJECTION, SOLUTION INTRAVENOUS at 15:24

## 2023-01-20 RX ADMIN — Medication 20 ML: at 10:16

## 2023-01-20 RX ADMIN — DIPHENHYDRAMINE HYDROCHLORIDE 25 MG: 50 INJECTION, SOLUTION INTRAMUSCULAR; INTRAVENOUS at 15:03

## 2023-01-20 RX ADMIN — CARBOPLATIN 206.7 MG: 10 INJECTION, SOLUTION INTRAVENOUS at 16:50

## 2023-01-20 RX ADMIN — DEXAMETHASONE SODIUM PHOSPHATE: 10 INJECTION, SOLUTION INTRAMUSCULAR; INTRAVENOUS at 14:38

## 2023-01-20 RX ADMIN — DIPHENHYDRAMINE HYDROCHLORIDE 50 MG: 50 INJECTION, SOLUTION INTRAMUSCULAR; INTRAVENOUS at 16:11

## 2023-01-20 RX ADMIN — SODIUM CHLORIDE 20 ML/HR: 0.9 INJECTION, SOLUTION INTRAVENOUS at 14:22

## 2023-01-20 RX ADMIN — FAMOTIDINE 20 MG: 10 INJECTION, SOLUTION INTRAVENOUS at 16:13

## 2023-01-20 RX ADMIN — PACLITAXEL 151.2 MG: 6 INJECTION, SOLUTION, CONCENTRATE INTRAVENOUS at 16:02

## 2023-01-20 RX ADMIN — GADOBUTROL 8 ML: 604.72 INJECTION INTRAVENOUS at 10:15

## 2023-01-20 RX ADMIN — LIDOCAINE HYDROCHLORIDE,EPINEPHRINE BITARTRATE 1 ML: 20; .005 INJECTION, SOLUTION EPIDURAL; INFILTRATION; INTRACAUDAL; PERINEURAL at 10:16

## 2023-01-20 RX ADMIN — HYDROCORTISONE SODIUM SUCCINATE 100 MG: 100 INJECTION, POWDER, FOR SOLUTION INTRAMUSCULAR; INTRAVENOUS at 16:11

## 2023-01-20 NOTE — PROGRESS NOTES
Infusion reaction C1D8 to Taxol, increased Dex to 20mg in premeds for future cycles and increased administration time to 2 hours  If still not tolerating and develops a reaction, will opt to switch to docetaxel at that time      Ru Murry

## 2023-01-20 NOTE — PROGRESS NOTES
Pt to clinic for taxol and Savita  Pt offers no complaints today, however stated feeling dehydrated, recommended to drink more and to inform office about it

## 2023-01-20 NOTE — PROGRESS NOTES
Pt reports " I feel like I can't breathe"  Taxol infusion immediately stopped, hypersensitivity protocol given pt received IV benadryl, IV solu-cortef, and IV pepcid  Pt on 2 L O2  Pt reports feeling better with hypersensitivity and vsjuju Anthony Rn notified for further recommendations

## 2023-01-20 NOTE — PROGRESS NOTES
Per Halley Kasper RN, Gabrielle CHUNG reported for pt to not continue taxol today and to give Savita today, possible readjusting pre medications for future treatments prior to initating taxol infusion

## 2023-01-20 NOTE — PROGRESS NOTES
Pt reports feeling better  VSS  Carbo was administer with no incident  Pt aware to seek medical attention if worsening symtpoms  Pt aware of next appointment  Pt port accessed, flushed, and de-accessed with positive blood returned  AVS was offered, pt declined  Pt ambulated out of clinic safely

## 2023-01-23 ENCOUNTER — TELEPHONE (OUTPATIENT)
Dept: HEMATOLOGY ONCOLOGY | Facility: CLINIC | Age: 38
End: 2023-01-23

## 2023-01-23 NOTE — PROGRESS NOTES
Post procedure call completed    Bleeding: __x___yes _____no (pt mentioned that her steri-strips came off along with the bandaid at the time of her shower on 1/21/23  Pt covered the area with a bandaid until she could get to the store for more steri-strips  She replaced steri-strips and has not had active bleeding since )    Pain: _____yes ___X___no    Redness/Swelling: ______yes ___X___no    Band aid removed: __x___yes _____no    Original Steri-Strips intact: ______yes __x___no (pt mentioned that her steri-strips came off along with the bandaid at the time of her shower on 1/21/23  Pt covered the area with a bandaid until she could get to the store for more steri-strips    She replaced steri-strips and has not had active bleeding since )      Pt with no questions at this time, adv will call when results available, adv to call with any questions or concerns, has name/# for contact

## 2023-01-23 NOTE — TELEPHONE ENCOUNTER
Patient called in stating that she was told last week that she is scheduled for another infusion for this week but is not scheduled for an infusion  Please call patient to follow up

## 2023-01-23 NOTE — TELEPHONE ENCOUNTER
Call out to patient  Patient stated did not have an appointment for labs for this week   Message sent to infusion

## 2023-01-23 NOTE — TELEPHONE ENCOUNTER
Please schedule patient Tuesdays prior to treatment around 12noon for blood work/ labs  Please update patient with dates and times

## 2023-01-24 ENCOUNTER — HOSPITAL ENCOUNTER (OUTPATIENT)
Dept: INFUSION CENTER | Facility: CLINIC | Age: 38
Discharge: HOME | End: 2023-01-24

## 2023-01-24 DIAGNOSIS — C77.3 CARCINOMA OF RIGHT BREAST METASTATIC TO AXILLARY LYMPH NODE (HCC): Primary | ICD-10-CM

## 2023-01-24 DIAGNOSIS — Z95.828 PORT-A-CATH IN PLACE: ICD-10-CM

## 2023-01-24 DIAGNOSIS — C50.911 CARCINOMA OF RIGHT BREAST METASTATIC TO AXILLARY LYMPH NODE (HCC): Primary | ICD-10-CM

## 2023-01-24 LAB
ALBUMIN SERPL BCP-MCNC: 3.6 G/DL (ref 3.5–5)
ALP SERPL-CCNC: 72 U/L (ref 46–116)
ALT SERPL W P-5'-P-CCNC: 30 U/L (ref 12–78)
ANION GAP SERPL CALCULATED.3IONS-SCNC: 9 MMOL/L (ref 4–13)
AST SERPL W P-5'-P-CCNC: 14 U/L (ref 5–45)
BASOPHILS # BLD AUTO: 0.07 THOUSANDS/ÂΜL (ref 0–0.1)
BASOPHILS NFR BLD AUTO: 1 % (ref 0–1)
BILIRUB SERPL-MCNC: 0.31 MG/DL (ref 0.2–1)
BUN SERPL-MCNC: 16 MG/DL (ref 5–25)
CALCIUM SERPL-MCNC: 9 MG/DL (ref 8.3–10.1)
CHLORIDE SERPL-SCNC: 102 MMOL/L (ref 96–108)
CO2 SERPL-SCNC: 27 MMOL/L (ref 21–32)
CREAT SERPL-MCNC: 0.62 MG/DL (ref 0.6–1.3)
EOSINOPHIL # BLD AUTO: 0.22 THOUSAND/ÂΜL (ref 0–0.61)
EOSINOPHIL NFR BLD AUTO: 2 % (ref 0–6)
ERYTHROCYTE [DISTWIDTH] IN BLOOD BY AUTOMATED COUNT: 12.4 % (ref 11.6–15.1)
GFR SERPL CREATININE-BSD FRML MDRD: 113 ML/MIN/1.73SQ M
GLUCOSE SERPL-MCNC: 86 MG/DL (ref 65–140)
HCT VFR BLD AUTO: 37.6 % (ref 34.8–46.1)
HGB BLD-MCNC: 13 G/DL (ref 11.5–15.4)
IMM GRANULOCYTES # BLD AUTO: 0.03 THOUSAND/UL (ref 0–0.2)
IMM GRANULOCYTES NFR BLD AUTO: 0 % (ref 0–2)
LYMPHOCYTES # BLD AUTO: 3.58 THOUSANDS/ÂΜL (ref 0.6–4.47)
LYMPHOCYTES NFR BLD AUTO: 32 % (ref 14–44)
MCH RBC QN AUTO: 31.9 PG (ref 26.8–34.3)
MCHC RBC AUTO-ENTMCNC: 34.6 G/DL (ref 31.4–37.4)
MCV RBC AUTO: 92 FL (ref 82–98)
MONOCYTES # BLD AUTO: 0.59 THOUSAND/ÂΜL (ref 0.17–1.22)
MONOCYTES NFR BLD AUTO: 5 % (ref 4–12)
NEUTROPHILS # BLD AUTO: 6.73 THOUSANDS/ÂΜL (ref 1.85–7.62)
NEUTS SEG NFR BLD AUTO: 60 % (ref 43–75)
NRBC BLD AUTO-RTO: 0 /100 WBCS
PLATELET # BLD AUTO: 323 THOUSANDS/UL (ref 149–390)
PMV BLD AUTO: 9.9 FL (ref 8.9–12.7)
POTASSIUM SERPL-SCNC: 3.7 MMOL/L (ref 3.5–5.3)
PROT SERPL-MCNC: 6.9 G/DL (ref 6.4–8.4)
RBC # BLD AUTO: 4.08 MILLION/UL (ref 3.81–5.12)
SODIUM SERPL-SCNC: 138 MMOL/L (ref 135–147)
WBC # BLD AUTO: 11.22 THOUSAND/UL (ref 4.31–10.16)

## 2023-01-24 NOTE — PROGRESS NOTES
Pt presents for port labs, offers no complaints, port accessed, labs drawn port flushed and de-accessed, AVS declined aware of next appt, d/c from unit stable

## 2023-01-25 ENCOUNTER — HOSPITAL ENCOUNTER (OUTPATIENT)
Dept: ULTRASOUND IMAGING | Facility: CLINIC | Age: 38
Discharge: HOME | End: 2023-01-25

## 2023-01-25 NOTE — PROGRESS NOTES
Hematology/Oncology Outpatient Office Note    Date of Service: 2023    [facility] HEMATOLOGY ONCOLOGY SPECIALISTS [address] [facility]    Reason for Consultation:   Chief Complaint   Patient presents with   • Follow-up     3 week follow-up with labs       Cancer Stage at diagnosis: IIIB    Referral Physician: No ref  provider found    Primary Care Physician:  Risa Olmstead PA-C     Nickname: Megha Deborah    Spouse: Mandeep    Original ECO    Today's ECO     Goals and Barriers:  Current Goal: Minimize effects of disease burden, extend life  Barriers to accomplishing this: None    Patient's Capacity to Self Care:  Patient is able to self care    Code Status: not addressed today    Advanced directives: not addressed today    ASSESSMENT & PLAN      Diagnosis ICD-10-CM Associated Orders   1  Carcinoma of right breast metastatic to axillary lymph node (HCC)  C50 911     C77 3       2  Triple negative breast cancer ([facility] [address] )  C50 919       3  Chemotherapy adverse reaction, subsequent encounter  T45 1X5D             This is a 42 y o  c PMHx notable for asthma, HTN, being seen in consultation for locally advanced triple negative breast cancer      Discussion of decision making  • Oncology history updated, accordingly, during this visit  • Goals of care/patient communication  o I discussed with the patient the clinical course leading up to their cancer diagnosis  I reviewed relevant office notes, imaging reports and pathology result as well   o I told the patient that this is a case of curable disease and what this means  We discussed that the goal of anti-cancer therapy is to provide best quality of life, extend overall survival, and progression free survival as shown in clinical trials   We also discussed that there might be a point when the cancer will no longer respond to this anti-neoplastic therapy   o I explained the risks/benefits of the proposed cancer therapy: Pembro + Carbo-Taxol for 4 cycles then 4 cycles of Pembro + adriamycin/cytoxan followed by surgery and then maintenance Pembro (for 9 cycles) and after discussion including understanding risks of possible life-threatening complications and therapy-related malignancy development, informed consent for blood products and treatment has been signed and obtained     • TNM/Staging At Diagnosis  o [id number]  Cancer Staging   IIIB  • Disease Features/Tumor Markers/Genetics  o Tumor Marker: n/a  o Notable Path Features: 12/22/2022: Invasive mammary carcinoma, suma grade 2, nuclear grade 3  ER/TN/HER-2 0, Ki-67 50%, nuclear grade 3  • Treatment: neoadjuvant Pembro-Carbo-taxol followed by [facility], then surgery, then maintenance Pembro  • Other Supportive care: Neulasta  • Treatment Team Members  o Surgeon: Dr Suzette Reyes  o 1/24/23: WBC 11 22, Hgb 13, MCV 92, PLT 323K, diff normal  CMP normal    • Diagnostics  12/15/2022 Right Mammogram diagnostic bilateral w 3d & cad: There is an ill-defined approximately 5 cm focal asymmetry seen in the inner central region of the right breast in the anterior depth  The asymmetry correlates with the palpable mass reported by the patient  US breast right limited (diagnostic): Within the area of palpable concern, there are multiple ill-defined hypoechoic masses  Biopsy of the 17 mm x 13 mm x 23 mm irregularly shaped, hypoechoic mass with indistinct margins in the right breast at 2 o'clock, 6 cm from the nipple and 20 mm x 7 mm x 28 mm irregularly shaped, hypoechoic mass with indistinct margins seen in the right breast at 4 o'clock, 6 cm from the nipple are recommended  US breast right limited (diagnostic): A lymph node with focally thickened cortex is noted in the right axilla  12/29/2022 CT CAP w/c: Multiple small lung nodules  These may be inflammatory though a three-month follow-up is recommended to completely exclude metastasis   Unremarkable abdomen and pelvis without evidence of metastatic disease  Minimal paraseptal emphysema at the apices  1/4/2023 Nuc Bone scan: No scintigraphic evidence of osseous metastasis    We discussed possibility of infertility on chemo on 1/4/2023 but she has deferred fertility referral as she is not interested in having more children  Discussion of decision making    I personally reviewed the following lab results, the image studies, pathology, other specialty/physicians consult notes and recommendations, and outside medical records from [facility]  I had a lengthy discussion with the patient and shared the work-up findings  We discussed the diagnosis and management plan as below  I spent 20 minutes reviewing the records (labs, clinician notes, outside records, medical history, ordering medicine/tests/procedures, interpreting the imaging/labs previously done) and coordination of care as well as direct time with the patient today, of which greater than 50% of the time was spent in counseling and coordination of care with the patient/family  · Plan/Labs  · Patient to continue current treatment tomorrow - C1D15  She had reaction to Taxol last week  Per Dr Cliff Perez, patient to proceed with Taxol over 2 hours with increased Dexamethasone 20mg prior  May consider switching paclitaxel to Taxotere if needed  ? Plan --> Neoadjuvant Pembro-Carbo-taxol for 4 cycles then 4 cycles of Pembro + adriamycin/cytoxan followed by surgery  After surgery, plan is for maintenance Pembro for 9 cycles  Labs preceding chemo/immunotherapy to be completed with CBC, CMP, TSH  · Zofran 8mg q8 prn nausea/vomiting  · F/u Surg Onc for eventual goal of surgery  · F/u genetics counselor testing  ? TTE to be completed prior to Adriamycin being given    Follow Up: q3 weeks for 8 total visits alternating between MD & MARLEE  Next F/U with Dr Cliff Perez 2/23/23  All questions were answered to the patient's satisfaction during this encounter   The patient knows the contact information for our office and knows to reach out for any relevant concerns related to this encounter  They are to call for any temperature 100 4 or higher, new symptoms including but not restricted to shaking chills, decreased appetite, nausea, vomiting, diarrhea, increased fatigue, shortness of breath or chest pain, confusion, and not feeling the strength to come to the clinic  For all other listed problems and medical diagnosis in their chart - they are managed by PCP and/or other specialists, which the patient acknowledges  Thank you very much for your consultation and making us a part of this patient's care  We are continuing to follow closely with you  Please do not hesitate to reach out to me with any additional questions or concerns  Dhruv Tian PA-C  Hematology & Medical Oncology Staff          Disclaimer: This document was prepared using [employer] Fluency Direct technology  If a word or phrase is confusing, or does not make sense, this is likely due to recognition error which was not discovered during this clinician's review  If you believe an error has occurred, please contact me through [address] [employer] line for regina? cation        ONCOLOGY HISTORY OF PRESENT ILLNESS        Oncology History   Malignant neoplasm of overlapping sites of right female breast ([facility] [address] ) (Resolved)   12/29/2022 Initial Diagnosis    Malignant neoplasm of overlapping sites of right female breast [facility])     Carcinoma of right breast metastatic to axillary lymph node ([facility] [address] )   12/22/2022 -  Cancer Staged    Staging form: Breast, AJCC 8th Edition  - Clinical stage from 12/22/2022: Stage IIIB (cT3, cN1, cM0, G2, ER-, CA-, HER2-) - Signed by Paul Mayer MD on 1/4/2023  Stage prefix: Initial diagnosis  Nuclear grade: G3  Mitotic count score: Score 1  Histologic grading system: 3 grade system  Menopausal status: Premenopausal       12/22/2022 Initial Diagnosis    Triple negative breast cancer ([facility] [address] )     12/22/2022 Biopsy    Right breast ultrasound guided biopsy  A  2 o'clock, 6cm from nipple  Invasive mammary carcinoma of no special type (ductal NST/invasive ductal carcinoma)   Grade 2  ER 0  AL 0   HER2 0  Ki-67 50%    B  4 o'clock, 6cm from nipple  Invasive mammary carcinoma of no special type (ductal NST/invasive ductal carcinoma)   Grade 2   ER 0   AL 0   HER2 2+   FISH pending  Ki-67 40%     C  Right Axillary lymph node  Metastatic carcinoma consistent with mammary primary     12/29/2022 Initial Diagnosis    Carcinoma of right breast [facility] to axillary [facility] node [facility] )     12/30/2022 Genetic Testing    Ambry  A total of 36 genes were evaluated, including: FRANKO, BRCA1, BRCA2, CDH1, CHEK2, PALB2, PTEN, STK11, TP53  Negative result   No pathogenic sequence variants or deletions/duplications identified       1/13/2023 -  Chemotherapy    DOXOrubicin (ADRIAMYCIN), 60 mg/m2 = 113 mg, Intravenous, Once, 0 of 4 cycles  cyclophosphamide (CYTOXAN) IVPB, 600 mg/m2 = 1,134 mg, Intravenous, Once, 0 of 4 cycles  CARBOplatin (PARAPLATIN) IVPB (GO AUC DOSING), 201 9 mg (83 1 % of original dose 243 mg), Intravenous, Once, 1 of 4 cycles  Dose modification:   (original dose 243 mg, Cycle 1, Reason: Other (Must fill in a comment)),   (original dose 243 mg, Cycle 1)  Administration: 201 9 mg (1/13/2023), 206 7 mg (1/20/2023)  PACLItaxel (TAXOL) chemo IVPB, 80 mg/m2 = 151 2 mg, Intravenous, Once, 1 of 4 cycles  Administration: 151 2 mg (1/13/2023), 151 2 mg (1/20/2023)  aprepitant (CINVANTI) in  mL IVPB, 130 mg, Intravenous, Once, 0 of 4 cycles  pembrolizumab (KEYTRUDA) IVPB, 200 mg, Intravenous, Once, 1 of 17 cycles  Administration: 200 mg (1/13/2023)     Triple negative breast cancer ([facility] [address] )   12/22/2022 Initial Diagnosis    Triple negative breast cancer ([facility] )     12/22/2022 Biopsy    Right breast ultrasound guided biopsy  A  2 o'clock, 6cm from nipple  Invasive mammary carcinoma of no special type (ductal NST/invasive ductal carcinoma)   Grade 2  ER 0  AL 0   HER2 0  Ki-67 50%    B  4 o'clock, 6cm from nipple  Invasive mammary carcinoma of no special type (ductal NST/invasive ductal carcinoma)   Grade 2   ER 0   AZ 0   HER2 2+   FISH pending  Ki-67 40%     C  Right Axillary lymph node  Metastatic carcinoma consistent with mammary primary     12/30/2022 Genetic Testing    Ambry  A total of 36 genes were evaluated, including: FRANKO, BRCA1, BRCA2, CDH1, CHEK2, PALB2, PTEN, STK11, TP53  Negative result  No pathogenic sequence variants or deletions/duplications identified             SUBJECTIVE  (INTERVAL HISTORY)      Clotting History    Bleeding History    Cancer History    Family Cancer History    H/O Blood/Plt Transfusion    Tobacco/etoh/drug abuse    Hx COVID19 Infection and Vaccine Status    Cancer Screening history    Occupation    New medications in the last month:  Pain:    Interval History 1/25/2023  Patient is nervous she may have reaction again tomorrow  However, she wants to continue recommended treatment  She otherwise offers no complaints  No new HA, vision changes, CP, SOB, rashes, Abd pain, N/V/D  No bleeding anywhere  I have reviewed the relevant past medical, surgical, social and family history  I have also reviewed allergies and medications for this patient  Review of Systems  Review of Systems   Constitutional: Negative for chills, decreased appetite, diaphoresis, fever, malaise/fatigue, night sweats, weight gain and weight loss  HENT: Negative for nosebleeds  Eyes: Negative for blurred vision, double vision, vision loss in left eye and vision loss in right eye  Cardiovascular: Negative for chest pain, cyanosis, dyspnea on exertion, irregular heartbeat, leg swelling and palpitations  Respiratory: Negative for cough, hemoptysis, shortness of breath, sleep disturbances due to breathing, snoring, sputum production and wheezing  Endocrine: Negative for cold intolerance  Hematologic/Lymphatic: Negative for adenopathy and bleeding problem   Does not bruise/bleed easily  Skin: Negative for color change, dry skin, flushing, nail changes, poor wound healing, rash, skin cancer and suspicious lesions  Musculoskeletal: Negative for arthritis, back pain, joint pain, muscle cramps and muscle weakness  Genitourinary: Negative for bladder incontinence, frequency, hematuria, hesitancy, incomplete emptying, menorrhagia, pelvic pain and urgency  Neurological: Negative for difficulty with concentration, excessive daytime sleepiness, dizziness, light-headedness, loss of balance and weakness  Psychiatric/Behavioral: Negative for altered mental status  The patient is nervous/anxious  A 10-point review of system was performed, pertinent positive and negative were detailed as above  Otherwise, the 10-point review of system was negative        Past Medical History:   Diagnosis Date   • Asthma    • Hypertension        Past Surgical History:   Procedure Laterality Date   •  SECTION     • IR PORT PLACEMENT  2023   • MRI BREAST BIOPSY LEFT (ALL INCLUSIVE) Left 2023   • TUBAL LIGATION     • US GUIDANCE BREAST BIOPSY RIGHT EACH ADDITIONAL Right 2022   • US GUIDED BREAST BIOPSY RIGHT COMPLETE Right 2022   • US GUIDED BREAST LYMPH NODE BIOPSY RIGHT Right 2022       Family History   Problem Relation Age of Onset   • Lung cancer Mother 61   • Ovarian cancer Mother    • No Known Problems Daughter    • No Known Problems Daughter    • Skin cancer Maternal Grandmother    • Ovarian cancer Maternal Grandmother    • Prostate cancer Maternal Grandfather    • Breast cancer Maternal Aunt    • Ovarian cancer Maternal Aunt    • Breast cancer Paternal Aunt        Social History     Socioeconomic History   • Marital status: Single     Spouse name: Not on file   • Number of children: Not on file   • Years of education: Not on file   • Highest education level: Not on file   Occupational History   • Not on file   Tobacco Use   • Smoking status: Every Day Packs/day: 0 50     Years: 15 00     Pack years: 7 50     Types: Cigarettes   • Smokeless tobacco: Never   Vaping Use   • Vaping Use: Never used   Substance and Sexual Activity   • Alcohol use: Not Currently   • Drug use: Never   • Sexual activity: Yes     Partners: Male     Birth control/protection: Female Sterilization   Other Topics Concern   • Not on file   Social History Narrative   • Not on file     Social Determinants of Health     Financial Resource Strain: Not on file   Food Insecurity: Not on file   Transportation Needs: Not on file   Physical Activity: Not on file   Stress: Not on file   Social Connections: Not on file   Intimate Partner Violence: Not on file   Housing Stability: Not on file       No Known Allergies    Current Outpatient Medications   Medication Sig Dispense Refill   • albuterol (PROVENTIL HFA,VENTOLIN HFA) 90 mcg/act inhaler inhale 2 puffs by mouth and INTO THE LUNGS every 6 hours if needed for wheezing 18 g 11   • amLODIPine (NORVASC) 5 mg tablet Take 2 tablets (10 mg total) by mouth daily 60 tablet 2   • fexofenadine (ALLEGRA) 180 MG tablet Take 1 tablet (180 mg total) by mouth daily 30 tablet 5   • lidocaine-prilocaine (EMLA) cream Apply topically as needed for mild pain 30 g 0   • montelukast (SINGULAIR) 10 mg tablet take 1 tablet by mouth at bedtime 30 tablet 5   • ondansetron (ZOFRAN-ODT) 8 mg disintegrating tablet Take 1 tablet (8 mg total) by mouth every 8 (eight) hours as needed for nausea or vomiting 20 tablet 0   • Symbicort 160-4 5 MCG/ACT inhaler inhale 2 puffs by mouth twice a day Rinse mouth after use 10 2 g 5     No current facility-administered medications for this visit       Facility-Administered Medications Ordered in Other Visits   Medication Dose Route Frequency Provider Last Rate Last Admin   • alteplase (CATHFLO) injection 2 mg  2 mg Intracatheter Q2H PRN Jaison Bowling MD           (Not in a hospital admission)      Objective:     24 Hour Vitals Assessment: Vitals:    01/26/23 0807   BP: 130/70   Pulse: 100   Resp: 18   Temp: 98 6 °F (37 °C)   SpO2: 100%       PHYSICIAN EXAM:    General: Appearance: alert, cooperative, no distress  HEENT: Normocephalic, atraumatic  No scleral icterus  conjunctivae clear  EOMI  Chest: No tenderness to palpation  No open wound noted  Port in place  Lungs: Clear to auscultation bilaterally, Respirations unlabored  Cardiac: Regular rate and rhythm, +S1and S2  Abdomen: Soft, non-tender, non-distended  Bowel sounds are normal   Breast: L breast unremarkable, R breast with 5x4 cm mass over the R [place]-quadrant of the breast, no inverted nipple  Extremities:  No edema, cyanosis, clubbing  Skin: Skin color, turgor are normal  No rashes  Lymphatics: no palpable supra-cervical, axillary, or inguinal adenopathy  Neurologic: Awake, Alert, and oriented, no gross focal deficits noted b/l  DATA REVIEW:    Pathology Result:    Final Diagnosis   Date Value Ref Range Status   01/20/2023   Final    A  Left breast, 3:00 (MRI-guided core biopsy, 12 passes):     - Benign breast tissue with fibrocystic change  - Negative for atypia and malignancy in sampled material     Comment:  Clinical / radiologic correlation is recommended to ensure adequate sampling of the targeted lesion  12/22/2022   Final    A  Ultrasound biopsy right breast 2:00, 6 cm from nipple, 4 passes 14-gauge marquee:      - Invasive mammary carcinoma of no special type (ductal NST/invasive ductal carcinoma), see note      - Laverne grade 2 of 3 (total score: 7 of 9)          - Tubule formation: <10%, score 3          - Nuclear grade 3 of 3, score 3          - Mitoses < 3/mm2, (</= 7 mitoses/10HPF), score 1      - Invasive carcinoma involves 4 of 4 submitted core biopsies, max   dimension = 16 millimeters      - Ductal carcinoma in situ (DCIS): Not identified      - Lymphovascular invasion: Not identified      - Microcalcifications: Absent      - See synoptic report A    B  Right breast ultrasound biopsy at 4:00, 6 cm from nipple, 4 passes, 14-gauge marquee:      - Invasive mammary carcinoma of no special type (ductal NST/invasive ductal carcinoma) with apocrine features, see note      - [place] grade 2 of 3 (total score: 6 of 9)          - Tubule formation: 10% to 75%, score 2          - Nuclear grade 3 of 3, score 3          - Mitoses < 3/mm2, (</= 7 mitoses/10HPF), score 1      - Invasive carcinoma involves 4 of 4 submitted core biopsies, max  dimension = 15 millimeters      - Ductal carcinoma in situ (DCIS): Not identified      - Lymphovascular invasion: Not identified      - Microcalcifications: Absent      - See synoptic report B    C  Ultrasound right breast biopsy and axillary lymph node, 4 passes 14-gauge marquee:      - Metastatic carcinoma, consistent with mammary primary, see note           Image Results:   Image result are reviewed and documented in Hematology/Oncology history    MRI breast biopsy left (all inclusive)  Addendum: ADDENDUM:     PATHOLOGY RESULTS:    D) Non-mass Enhancement (Left; 3 o'clock; Middle; 2 5 cm from nipple)   Benign finding: the pathology findings indicate benign breast tissue and  fibrocystic change  This is concordant with imaging  Surgical consultation for biopsy confirmed malignancy in the right breast  recommended  RECOMMENDATION:        - Return to Routine Screening for the left breast         - Surgical Consultation for the right breast      END ADDENDUM  Narrative: DIAGNOSIS: Triple negative breast cancer ([facility] [address] )    TECHNIQUE:  MRI of the breast was performed in axial and sagittal planes utilizing a   combination of sagittal dynamic T1 pre- and post contrast imaging with fat   suppression in addition to axial post contrast fat saturated T1  Intravenous contrast was administered at 2cc/sec, without documented   contrast reaction  Prior to the procedure, previous imaging was reviewed    The procedure was   explained to the patient in detail  All questions were answered  Written   and verbal informed consent was obtained  MEDICATIONS ADMINISTERED:        INDICATION: Pietro Rebollar is a 42 y o  female presenting for clumped   non-mass enhancement 3:00 left breast     FINDINGS:   LEFT  D) NON-MASS ENHANCEMENT: Images of the left breast non-mass enhancement at   3 o'clock in the middle portion, 2 5 cm from the nipple were obtained  The   patient was placed prone on the biopsy table and a timeout was performed  A core needle biopsy was performed under MRI guidance using a lateral   approach  The skin was prepped in the usual fashion  Anesthesia was   administered using 5 mL of lidocaine 1% and 10 mL of lidocaine 1% with   epinephrine  A 12 G device was advanced  A clip was inserted into the target area  A stoplight biopsy clip marker   is placed in the target area  Post-placement MR imaging demonstrates the   clip was at the site  The patient tolerated the procedure well  There were   no immediate complications  Impression:    MRI guided biopsy for clumped non-mass enhancement 3:00 left breast     RECOMMENDATION:       - Waiting for pathology for the left breast      Workstation ID: [id number]      LABS:  Lab data are reviewed and documented in HemOn history         Lab Results   Component Value Date    HGB 13 0 01/24/2023    HCT 37 6 01/24/2023    MCV 92 01/24/2023     01/24/2023    WBC 11 22 (H) 01/24/2023    NRBC 0 01/24/2023     Lab Results   Component Value Date    K 3 7 01/24/2023     01/24/2023    CO2 27 01/24/2023    BUN 16 01/24/2023    CREATININE 0 62 01/24/2023    GLUF 105 (H) 10/27/2021    CALCIUM 9 0 01/24/2023    CORRECTEDCA 9 3 01/17/2023    AST 14 01/24/2023    ALT 30 01/24/2023    ALKPHOS 72 01/24/2023    EGFR 113 01/24/2023       No results found for: IRON, TIBC, FERRITIN    No results found for: [id number]    Recent Labs     01/24/23  1159   WBC 11 22*       By:  Vinita Jackson MARLEE, 1/26/2023, 8:30 AM

## 2023-01-26 ENCOUNTER — OFFICE VISIT (OUTPATIENT)
Dept: HEMATOLOGY ONCOLOGY | Facility: CLINIC | Age: 38
End: 2023-01-26

## 2023-01-26 ENCOUNTER — TELEPHONE (OUTPATIENT)
Dept: HEMATOLOGY ONCOLOGY | Facility: CLINIC | Age: 38
End: 2023-01-26

## 2023-01-26 VITALS
OXYGEN SATURATION: 100 % | HEIGHT: 64 IN | TEMPERATURE: 98.6 F | BODY MASS INDEX: 31.58 KG/M2 | HEART RATE: 100 BPM | WEIGHT: 185 LBS | SYSTOLIC BLOOD PRESSURE: 130 MMHG | DIASTOLIC BLOOD PRESSURE: 70 MMHG | RESPIRATION RATE: 18 BRPM

## 2023-01-26 DIAGNOSIS — C50.911 CARCINOMA OF RIGHT BREAST METASTATIC TO AXILLARY LYMPH NODE (HCC): Primary | ICD-10-CM

## 2023-01-26 DIAGNOSIS — T45.1X5D CHEMOTHERAPY ADVERSE REACTION, SUBSEQUENT ENCOUNTER: ICD-10-CM

## 2023-01-26 DIAGNOSIS — C77.3 CARCINOMA OF RIGHT BREAST METASTATIC TO AXILLARY LYMPH NODE (HCC): Primary | ICD-10-CM

## 2023-01-26 DIAGNOSIS — C50.919 TRIPLE NEGATIVE BREAST CANCER (HCC): ICD-10-CM

## 2023-01-26 NOTE — TELEPHONE ENCOUNTER
Please see med change  Please let me know if this will change any of her appt start times  Thank you!

## 2023-01-26 NOTE — PROGRESS NOTES
Title: increase of dexamethasone, increase of administration time for Paclitaxel    Date patient scheduled: 1/27/23    Original medication ordered: Dexamethasone 10mg  Paclitaxel 151 2mg to be administered over 1 hour    New Medication ordered: Dexamethasone 20mg    Paclitaxel 151 2mg to be administered over 2 hours    Office RN notified patient? ? Patient aware  Saw Marsha Hernandez PA-C on 1/26/23    Is the patient scheduled within 24 hours? ? If yes, follow up with verbal telephone call  Yes, Spoke with Levar Ellsworth RN at [facility] infusion   on 1/26/23 at [address] to route to [facility]  Infusion  pool routes to [facility]  Infusion tech to receive message, confirm scheduled treatment duration matches ordered treatment duration or adjust accordingly, and re-link appointment request orders  Infusion tech to notify pharmacy and finance

## 2023-01-26 NOTE — PROGRESS NOTES
TIMEOUT  Call from Vicki Kennedy RN,  Patients dexamethasone is getting increased from 10 mg to 20 mg,  And her Taxol dose is staying the same it will just be run over 2 hours instead of 1

## 2023-01-26 NOTE — TELEPHONE ENCOUNTER
Title: increase of dexamethasone, increase of administration time for Paclitaxel    Date patient scheduled: 1/27/23    Original medication ordered: Dexamethasone 10mg  Paclitaxel 151 2mg to be administered over 1 hour    New Medication ordered: Dexamethasone 20mg    Paclitaxel 151 2mg to be administered over 2 hours    Office RN notified patient? ? Patient aware  Saw Elian Galarza PA-C on 1/26/23    Is the patient scheduled within 24 hours? ? If yes, follow up with verbal telephone call  Yes, Spoke with Thi Kapadia RN at [facility] infusion   on 1/26/23 at [address] to route to [facility]  Infusion  pool routes to [facility]  Infusion tech to receive message, confirm scheduled treatment duration matches ordered treatment duration or adjust accordingly, and re-link appointment request orders  Infusion tech to notify pharmacy and finance

## 2023-01-27 ENCOUNTER — HOSPITAL ENCOUNTER (OUTPATIENT)
Dept: INFUSION CENTER | Facility: CLINIC | Age: 38
End: 2023-01-27

## 2023-01-27 ENCOUNTER — NUTRITION (OUTPATIENT)
Dept: NUTRITION | Facility: CLINIC | Age: 38
End: 2023-01-27

## 2023-01-27 VITALS
TEMPERATURE: 97.8 F | SYSTOLIC BLOOD PRESSURE: 138 MMHG | RESPIRATION RATE: 20 BRPM | BODY MASS INDEX: 31.44 KG/M2 | DIASTOLIC BLOOD PRESSURE: 74 MMHG | HEIGHT: 64 IN | HEART RATE: 104 BPM | WEIGHT: 184.16 LBS

## 2023-01-27 DIAGNOSIS — C50.911 CARCINOMA OF RIGHT BREAST METASTATIC TO AXILLARY LYMPH NODE (HCC): Primary | ICD-10-CM

## 2023-01-27 DIAGNOSIS — Z71.3 NUTRITIONAL COUNSELING: Primary | ICD-10-CM

## 2023-01-27 DIAGNOSIS — C77.3 CARCINOMA OF RIGHT BREAST METASTATIC TO AXILLARY LYMPH NODE (HCC): Primary | ICD-10-CM

## 2023-01-27 RX ORDER — SODIUM CHLORIDE 9 MG/ML
20 INJECTION, SOLUTION INTRAVENOUS ONCE
Status: COMPLETED | OUTPATIENT
Start: 2023-01-27 | End: 2023-01-27

## 2023-01-27 RX ADMIN — DIPHENHYDRAMINE HYDROCHLORIDE 25 MG: 50 INJECTION, SOLUTION INTRAMUSCULAR; INTRAVENOUS at 08:39

## 2023-01-27 RX ADMIN — PACLITAXEL 151.2 MG: 6 INJECTION, SOLUTION INTRAVENOUS at 09:40

## 2023-01-27 RX ADMIN — DEXAMETHASONE SODIUM PHOSPHATE: 10 INJECTION, SOLUTION INTRAMUSCULAR; INTRAVENOUS at 08:16

## 2023-01-27 RX ADMIN — SODIUM CHLORIDE 20 ML/HR: 0.9 INJECTION, SOLUTION INTRAVENOUS at 08:14

## 2023-01-27 RX ADMIN — CARBOPLATIN 206.7 MG: 10 INJECTION, SOLUTION INTRAVENOUS at 11:48

## 2023-01-27 RX ADMIN — FAMOTIDINE 20 MG: 10 INJECTION, SOLUTION INTRAVENOUS at 09:01

## 2023-01-27 NOTE — PATIENT INSTRUCTIONS
Nutrition Rx & Recommendations:  Diet: High Calorie, High Protein (for high calorie foods see pages 52-53, and for high protein foods see pages 49-51 in your Eating Hints book)  Include a variety of foods (as tolerated/allowed by diet)  Incorporate physical activity as able/allowed  Stay hydrated by sipping fluids of choice/tolerance throughout the day  Alter food choices and eating patterns to accommodate changing needs  Refer to Eating Hints book for other meal/snack ideas and symptom management  Weigh yourself regularly  If you notice weight loss, make an effort to increase your daily food/calorie intake  If you continue to notice loss after these efforts, reach out to your dietitian to establish a plan to stabilize weight  Ways to increase calorie and protein intake:   Eat when feeling most hungry  Choose foods that are easy to eat: yogurt, oatmeal, eggs, soup, cereal, muffins, granola bars  Keep non perishable snacks nearby (pretzels, crackers)    5-6 small meals/snacks daily  Protein at all meals/snacks: : eggs (scrambled, hard boiled, pan fried, egg whites), fish (salmon, tuna steal, swordfish, frannie, shrimp, cod, jihan), beans (chickpeas, black beans, lentils), nuts/nut butters, quinoa, peas, oatmeal, seeds (omega, flax, pumpkin), soy milk, cheese, Greek yogurt, chicken, lean ground beef, lean cuts of beef (loin, round, flank), turkey breast    Add high calorie foods to meals: cheese, milk, olive oil, avocado, butter, peanut butter, extra sauces/gravies, creamy salad dressings    Choose liquids with calories: whole milk, Fairlife milk (higher protein/lactose-free milk), chocolate milk, 100% fruit juice, diluted juice, bone broth (higher protein broth), creamy soups, sports drinks (Gatorade, Poweraide, Pedialyte, etc ), [place] ice, popsicles, milkshakes, smoothies, oral nutrition supplements (Ensure, Boost, etc ), gelatin/Jello, etc   Use oral nutrition supplements to make homemade smoothies or milkshakes  Continue Ensure 2x daily       Follow Up Plan: PRN per patient request   Recommend Referral to Other Providers: none at this time

## 2023-01-27 NOTE — PROGRESS NOTES
Outpatient Oncology Nutrition Consultation   Type of Consult: Follow Up  Care Location: [place] [id number] w/pt & SO Nicole Moranaco)    Reason for referral: notification by RN Navigator Frank Fulton on 1/16/23  (reason for referral: pt requesting prescription for Ensure)  Nutrition Assessment:   Oncology Diagnosis & Treatments:   Diagnosed with triple negative right breast cancer metastatic to axillary lymph node 12/29/22  · Began chemotherapy (doxorubicin, cytoxan, taxol, cinvanti, keytruda) 1/13/23  Oncology History   Malignant neoplasm of overlapping sites of right female breast ([facility] [address] ) (Resolved)   12/29/2022 Initial Diagnosis    Malignant neoplasm of overlapping sites of right female breast [facility])     Carcinoma of right breast metastatic to axillary lymph node ([facility] [address] )   12/22/2022 -  Cancer Staged    Staging form: Breast, AJCC 8th Edition  - Clinical stage from 12/22/2022: Stage IIIB (cT3, cN1, cM0, G2, ER-, VA-, HER2-) - Signed by Lito Hatch MD on 1/4/2023  Stage prefix: Initial diagnosis  Nuclear grade: G3  Mitotic count score: Score 1  Histologic grading system: 3 grade system  Menopausal status: Premenopausal       12/22/2022 Initial Diagnosis    Triple negative breast cancer ([facility] [address] )     12/22/2022 Biopsy    Right breast ultrasound guided biopsy  A  2 o'clock, 6cm from nipple  Invasive mammary carcinoma of no special type (ductal NST/invasive ductal carcinoma)   Grade 2  ER 0  VA 0   HER2 0  Ki-67 50%    B  4 o'clock, 6cm from nipple  Invasive mammary carcinoma of no special type (ductal NST/invasive ductal carcinoma)   Grade 2   ER 0   VA 0   HER2 2+   FISH pending  Ki-67 40%     C   Right Axillary lymph node  Metastatic carcinoma consistent with mammary primary     12/29/2022 Initial Diagnosis    Carcinoma of right breast metastatic to axillary lymph node ([facility] [address] )     12/30/2022 Genetic Testing    [facility]  A total of 36 genes were evaluated, including: FRANKO, BRCA1, BRCA2, CDH1, CHEK2, PALB2, PTEN, STK11, TP53  Negative result  No pathogenic sequence variants or deletions/duplications identified       1/13/2023 -  Chemotherapy    DOXOrubicin (ADRIAMYCIN), 60 mg/m2 = 113 mg, Intravenous, Once, 0 of 4 cycles  cyclophosphamide (CYTOXAN) IVPB, 600 mg/m2 = 1,134 mg, Intravenous, Once, 0 of 4 cycles  CARBOplatin (PARAPLATIN) IVPB (GOG AUC DOSING), 201 9 mg (83 1 % of original dose 243 mg), Intravenous, Once, 1 of 4 cycles  Dose modification:   (original dose 243 mg, Cycle 1, Reason: Other (Must fill in a comment)),   (original dose 243 mg, Cycle 1)  Administration: 201 9 mg (1/13/2023), 206 7 mg (1/20/2023)  PACLItaxel (TAXOL) chemo IVPB, 80 mg/m2 = 151 2 mg, Intravenous, Once, 1 of 4 cycles  Administration: 151 2 mg (1/13/2023), 151 2 mg (1/20/2023)  aprepitant (CINVANTI) in  mL IVPB, 130 mg, Intravenous, Once, 0 of 4 cycles  pembrolizumab (KEYTRUDA) IVPB, 200 mg, Intravenous, Once, 1 of 17 cycles  Administration: 200 mg (1/13/2023)     Triple negative breast cancer ([facility] [address] )   12/22/2022 Initial Diagnosis    [facility] )     12/22/2022 Biopsy    Right breast ultrasound guided biopsy  A  2 o'clock, 6cm from nipple  Invasive mammary carcinoma of no special type (ductal NST/invasive ductal carcinoma)   Grade 2  ER 0  TX 0   HER2 0  Ki-67 50%    B  4 o'clock, 6cm from nipple  Invasive mammary carcinoma of no special type (ductal NST/invasive ductal carcinoma)   Grade 2   ER 0   TX 0   HER2 2+   FISH pending  Ki-67 40%     C  Right Axillary lymph node  Metastatic carcinoma consistent with mammary primary     12/30/2022 Genetic Testing    [facility]  A total of 36 genes were evaluated, including: FRANKO, BRCA1, BRCA2, CDH1, CHEK2, PALB2, PTEN, STK11, TP53  Negative result   No pathogenic sequence variants or deletions/duplications identified         Past Medical & Surgical Hx:   Patient Active Problem List   Diagnosis   • Hypertension   • Asthma   • Cervical radiculopathy   • Chronic bilateral low back pain   • DDD (degenerative disc disease), lumbar   • Binge eating disorder   • BMI 33 0-33 9,adult   • Pre-diabetes   • Cigarette nicotine dependence without complication   • Carcinoma of right breast metastatic to axillary lymph node (HCC)   • Triple negative breast cancer (HCC)   • Port-A-Cath in place     Past Medical History:   Diagnosis Date   • Asthma    • Hypertension      Past Surgical History:   Procedure Laterality Date   •  SECTION     • IR PORT PLACEMENT  2023   • MRI BREAST BIOPSY LEFT (ALL INCLUSIVE) Left 2023   • TUBAL LIGATION     • US GUIDANCE BREAST BIOPSY RIGHT EACH ADDITIONAL Right 2022   • US GUIDED BREAST BIOPSY RIGHT COMPLETE Right 2022   • US GUIDED BREAST LYMPH NODE BIOPSY RIGHT Right 2022       Review of Medications:   Vitamins, Supplements and Herbals: No, pt denies taking supplements    Current Outpatient Medications:   •  albuterol (PROVENTIL HFA,VENTOLIN HFA) 90 mcg/act inhaler, inhale 2 puffs by mouth and INTO THE LUNGS every 6 hours if needed for wheezing, Disp: 18 g, Rfl: 11  •  amLODIPine (NORVASC) 5 mg tablet, Take 2 tablets (10 mg total) by mouth daily, Disp: 60 tablet, Rfl: 2  •  fexofenadine (ALLEGRA) 180 MG tablet, Take 1 tablet (180 mg total) by mouth daily, Disp: 30 tablet, Rfl: 5  •  lidocaine-prilocaine (EMLA) cream, Apply topically as needed for mild pain, Disp: 30 g, Rfl: 0  •  montelukast (SINGULAIR) 10 mg tablet, take 1 tablet by mouth at bedtime, Disp: 30 tablet, Rfl: 5  •  ondansetron (ZOFRAN-ODT) 8 mg disintegrating tablet, Take 1 tablet (8 mg total) by mouth every 8 (eight) hours as needed for nausea or vomiting, Disp: 20 tablet, Rfl: 0  •  Symbicort 160-4 5 MCG/ACT inhaler, inhale 2 puffs by mouth twice a day Rinse mouth after use, Disp: 10 2 g, Rfl: 5  No current facility-administered medications for this visit      Facility-Administered Medications Ordered in Other Visits:   •  alteplase (CATHFLO) injection 2 mg, 2 mg, Intracatheter, Q2H PRN, Farideh Yeh MD  •  CARBOplatin (PARAPLATIN) 206 7 mg in sodium chloride 0 9 % 250 mL IVPB, 206 7 mg, Intravenous, Once, Farideh Yeh MD  •  PACLitaxel (TAXOL) 151 2 mg in sodium chloride 0 9 % 250 mL chemo IVPB, 80 mg/m2 (Treatment Plan Recorded), Intravenous, Once, Farideh Yeh MD    Most Recent Lab Results:   Lab Results   Component Value Date    WBC 11 22 (H) 01/24/2023    NEUTROABS 6 73 01/24/2023    CHOLESTEROL 159 10/27/2021    TRIG 185 (H) 10/27/2021    HDL 34 (L) 10/27/2021    LDLCALC 88 10/27/2021    ALT 30 01/24/2023    AST 14 01/24/2023    ALB 3 6 01/24/2023    SODIUM 138 01/24/2023    SODIUM 141 01/17/2023    K 3 7 01/24/2023    K 3 8 01/17/2023     01/24/2023    BUN 16 01/24/2023    BUN 11 01/17/2023    CREATININE 0 62 01/24/2023    CREATININE 0 54 (L) 01/17/2023    EGFR 113 01/24/2023    GLUF 105 (H) 10/27/2021    GLUC 86 01/24/2023    HGBA1C 5 7 (H) 11/10/2021    CALCIUM 9 0 01/24/2023       Anthropometric Measurements:   Height: 64"  Ht Readings from Last 1 Encounters:   01/27/23 5' 4 02" (1 626 m)     Wt Readings from Last 20 Encounters:   01/27/23 83 5 kg (184 lb 2 5 oz)   01/26/23 83 9 kg (185 lb)   01/20/23 83 6 kg (184 lb 6 4 oz)   01/20/23 83 kg (183 lb)   01/13/23 83 4 kg (183 lb 12 8 oz)   01/13/23 83 5 kg (184 lb)   01/12/23 83 5 kg (184 lb)   01/09/23 83 5 kg (184 lb)   01/04/23 83 5 kg (184 lb)   12/29/22 82 6 kg (182 lb)   12/15/22 83 kg (182 lb 15 7 oz)   12/07/22 83 6 kg (184 lb 3 2 oz)   11/08/22 84 4 kg (186 lb)   10/31/22 83 kg (183 lb)   09/15/22 81 2 kg (179 lb)   09/11/22 81 6 kg (180 lb)   02/10/22 85 3 kg (188 lb)   01/03/22 84 8 kg (187 lb)   11/18/21 83 5 kg (184 lb)   11/09/21 83 6 kg (184 lb 6 4 oz)     Weight History:   • Usual Weight: 180-185#  • Varian: none  • Home Scale: none     Oncology Nutrition-Anthropometrics    Flowsheet Row Nutrition from 1/27/2023 in [address] [place] [address] Nutrition from 1/18/2023 in [address] [place] [address]   Patient [age] (years): 42 years 42 years   Patient (female) height (in): 59 in 59 in   Current Weight to be used for anthropometric calculations (lbs) 184 2 lbs 183 8 lbs   Current Weight to be used for anthropometric calculations (kg) 83 7 kg 83 5 kg   BMI: 31 6 31 5   IBW female: 120 lbs 120 lbs   IBW (kg) female: 54 5 kg 54 5 kg   IBW % (female) 153 5 % 153 2 %   Adjusted BW (female): 136 1 lbs 136 lbs   Adjusted BW kg (female): 61 9 kg 61 8 kg   % weight change after 1 week: 0 7 % -0 1 %   Weight change after 1 week (lbs) 1 2 lbs -0 2 lbs   % weight change after 1 month: 1 2 % 0 5 %   Weight change after 1 month (lbs) 2 2 lbs 0 9 lbs   % weight change after 3 months: 0 7 % 0 4 %   Weight change after 3 months (lbs) 1 2 lbs 0 8 lbs   % weight change after 1 year: -- -1 7 %          Nutrition-Focused Physical Findings: n/a due to telephone call    Food/Nutrition-Related History & Client/Social History:    Current Nutrition Impact Symptoms:  [] Nausea  [] Reduced Appetite  [] Acid Reflux    [] Vomiting  [] Unintended Wt Loss  [] Malabsorption    [] Diarrhea  [] Unintended Wt Gain  [] Dumping Syndrome    [] Constipation  [] Thick Mucous/Secretions  [] Abdominal Pain    [] Dysgeusia (Altered Taste)  [] Xerostomia (Dry Mouth)  [] Gas    [] Dysosmia (Altered Smell)  [] Thrush  [] Difficulty Chewing    [] Oral Mucositis (Sore Mouth)  [] Fatigue  [] Hyperglycemia; nonfasting BG 86mg/dL on 1/24/23   Lab Results   Component Value Date    HGBA1C 5 7 (H) 11/10/2021      [] Odynophagia  [] Esophagitis  [] Other:    [] Dysphagia  [] Early Satiety  [x] No Problems Eating      Food Allergies & Intolerances: Pt believes she may have lactose intolerance/sensitivity      Current Diet: Regular Diet, No Restrictions  Current Nutrition Intake: Unchanged from last visit  Appetite: Fair   Nutrition Route: PO  Oral Care: brushes BID  Activity level: ADL  Ambulates independently  25 Hr Diet Recall:   Breakfast:  omelette, hash browns, rye toast   Lunch: did not eat due to late breakfast   Snack: strawberries   Dinner: none     Beverages: water (18oz x2-3), coffee (12oz x1), [employer] Drink (12oz x1)  Supplements:   • Ensure Max Protein (11 oz, 150 kcal, 30 g pro) 2x daily      Oncology Nutrition-Estimated Needs    Flowsheet [facility] Nutrition from 2023 in [address] [place] [address] Nutrition from 2023 in [address] [place] [address]   Weight type used Actual weight Actual weight   Weight in kilograms (kg) used for estimated needs 83 7 kg 83 5 kg   Energy needs formula:  30-35 kcal/kg 30-35 kcal/kg   Energy needs based on 30 kcal/k kcal 2506 kcal   Energy needs based on 35 kcal/k kcal 2924 kcal   Protein needs formula: 1 2-1 5 g/kg 1 2-1 5 g/kg   Protein needs based on 1 2 g/k g 100 g   Protein needs based on 1 5 g/kg 126 g 125 g   Fluid needs formula: 30-35 mL/kg 30-35 mL/kg   Fluid needs based on 30 mL/kg 2508 mL 2508 mL   Fluid needs in ounces 85 oz 85 oz   Fluid needs based on 35 mL/kg 2926 mL 2926 mL   Fluid needs in ounces 99 oz 99 oz           Discussion & Intervention:   [place] was evaluated today for an RD follow up regarding poor po intake and weight management  [place] is currently undergoing tx for metastatic breast cancer  She explains today that she has been able to continue to eat without difficulty/side effects affecting her appetite  Her weight is currently stable at this time  She has no nutrition questions/concerns today  Based on today's assessment, discussion included: MNT for: Breast Cancer, a high calorie high protein diet & food choices to include at all meals & snacks, continuing oral nutrition supplements and mindful eating concepts  Moving forward, [place] was encouraged to increase kcal, protein, and fluid intakes    Materials Provided: not applicable  All questions and concerns addressed during today’s visit  [place] has RD contact information  Nutrition Diagnosis:   • Inadequate Energy Intake related to physiological causes, disease state and treatment related issues as evidenced by food recall, wt loss and discussion with pt and/or family  • Increased Nutrient Needs (kcal & pro) related to increased demand for nutrients and disease state as evidenced by cancer dx and pt undergoing tx for cancer  Monitoring & Evaluation:   Goals:  · weight maintenance/stabilization  · adequate nutrition impact symptom management  · pt to meet >/=75% estimated nutrition needs daily  · increase protein intake  · increase fluid intake  · increase calorie intake    · Progress Towards Goals: Progressing    Nutrition Rx & Recommendations:  · Diet: High Calorie, High Protein (for high calorie foods see pages 52-53, and for high protein foods see pages 49-51 in your Eating Hints book)  · Include a variety of foods (as tolerated/allowed by diet)  · Incorporate physical activity as able/allowed  · Stay hydrated by sipping fluids of choice/tolerance throughout the day  · Alter food choices and eating patterns to accommodate changing needs  · Refer to Eating Hints book for other meal/snack ideas and symptom management  · Weigh yourself regularly  If you notice weight loss, make an effort to increase your daily food/calorie intake  If you continue to notice loss after these efforts, reach out to your dietitian to establish a plan to stabilize weight  Ways to increase calorie and protein intake:   • Eat when feeling most hungry  • Choose foods that are easy to eat: yogurt, oatmeal, eggs, soup, cereal, muffins, granola bars  • Keep non perishable snacks nearby (pretzels, crackers)    • 5-6 small meals/snacks daily  • Protein at all meals/snacks: : eggs (scrambled, hard boiled, pan fried, egg whites), fish (salmon, tuna steal, swordfish, frannie, shrimp, cod, jihan), beans (chickpeas, black beans, lentils), nuts/nut butters, quinoa, peas, oatmeal, seeds (omega, flax, pumpkin), soy milk, cheese, Greek yogurt, chicken, lean ground beef, lean cuts of beef (loin, round, flank), turkey breast    • Add high calorie foods to meals: cheese, milk, olive oil, avocado, butter, peanut butter, extra sauces/gravies, creamy salad dressings    • Choose liquids with calories: whole milk, Fairlife milk (higher protein/lactose-free milk), chocolate milk, 100% fruit juice, diluted juice, bone broth (higher protein broth), creamy soups, sports drinks (Gatorade, Poweraide, Pedialyte, etc ), [place] ice, popsicles, milkshakes, smoothies, oral nutrition supplements (Ensure, Boost, etc ), gelatin/Jello, etc   o Use oral nutrition supplements to make homemade smoothies or milkshakes  o Continue Ensure 2x daily       Follow Up Plan: PRN per patient request   Recommend Referral to Other Providers: none at this time

## 2023-01-27 NOTE — PROGRESS NOTES
Pt reports to unit feeling well, offering no complaints today  Patient receiving Taxol and Carboplatin, tolerated infusion well without any adverse events  Patient aware of next appointment, Port de-accessed, AVS declined

## 2023-01-31 ENCOUNTER — TELEPHONE (OUTPATIENT)
Dept: NUTRITION | Facility: CLINIC | Age: 38
End: 2023-01-31

## 2023-01-31 ENCOUNTER — HOSPITAL ENCOUNTER (OUTPATIENT)
Dept: INFUSION CENTER | Facility: CLINIC | Age: 38
Discharge: HOME | End: 2023-01-31

## 2023-01-31 DIAGNOSIS — C50.911 CARCINOMA OF RIGHT BREAST METASTATIC TO AXILLARY LYMPH NODE (HCC): Primary | ICD-10-CM

## 2023-01-31 DIAGNOSIS — Z95.828 PORT-A-CATH IN PLACE: ICD-10-CM

## 2023-01-31 DIAGNOSIS — J45.50 SEVERE PERSISTENT ASTHMA, UNSPECIFIED WHETHER COMPLICATED: ICD-10-CM

## 2023-01-31 DIAGNOSIS — C77.3 CARCINOMA OF RIGHT BREAST METASTATIC TO AXILLARY LYMPH NODE (HCC): Primary | ICD-10-CM

## 2023-01-31 LAB
ALBUMIN SERPL BCP-MCNC: 3.5 G/DL (ref 3.5–5)
ALP SERPL-CCNC: 80 U/L (ref 46–116)
ALT SERPL W P-5'-P-CCNC: 31 U/L (ref 12–78)
ANION GAP SERPL CALCULATED.3IONS-SCNC: 9 MMOL/L (ref 4–13)
AST SERPL W P-5'-P-CCNC: 17 U/L (ref 5–45)
BASOPHILS # BLD AUTO: 0.05 THOUSANDS/ÂΜL (ref 0–0.1)
BASOPHILS NFR BLD AUTO: 1 % (ref 0–1)
BILIRUB SERPL-MCNC: 0.35 MG/DL (ref 0.2–1)
BUN SERPL-MCNC: 11 MG/DL (ref 5–25)
CALCIUM SERPL-MCNC: 8.7 MG/DL (ref 8.3–10.1)
CHLORIDE SERPL-SCNC: 104 MMOL/L (ref 96–108)
CO2 SERPL-SCNC: 26 MMOL/L (ref 21–32)
CREAT SERPL-MCNC: 0.86 MG/DL (ref 0.6–1.3)
EOSINOPHIL # BLD AUTO: 0.06 THOUSAND/ÂΜL (ref 0–0.61)
EOSINOPHIL NFR BLD AUTO: 1 % (ref 0–6)
ERYTHROCYTE [DISTWIDTH] IN BLOOD BY AUTOMATED COUNT: 12.6 % (ref 11.6–15.1)
GFR SERPL CREATININE-BSD FRML MDRD: 85 ML/MIN/1.73SQ M
GLUCOSE SERPL-MCNC: 154 MG/DL (ref 65–140)
HCT VFR BLD AUTO: 37.3 % (ref 34.8–46.1)
HGB BLD-MCNC: 13.2 G/DL (ref 11.5–15.4)
IMM GRANULOCYTES # BLD AUTO: 0.02 THOUSAND/UL (ref 0–0.2)
IMM GRANULOCYTES NFR BLD AUTO: 0 % (ref 0–2)
LYMPHOCYTES # BLD AUTO: 1.81 THOUSANDS/ÂΜL (ref 0.6–4.47)
LYMPHOCYTES NFR BLD AUTO: 21 % (ref 14–44)
MCH RBC QN AUTO: 32.3 PG (ref 26.8–34.3)
MCHC RBC AUTO-ENTMCNC: 35.4 G/DL (ref 31.4–37.4)
MCV RBC AUTO: 91 FL (ref 82–98)
MONOCYTES # BLD AUTO: 0.23 THOUSAND/ÂΜL (ref 0.17–1.22)
MONOCYTES NFR BLD AUTO: 3 % (ref 4–12)
NEUTROPHILS # BLD AUTO: 6.57 THOUSANDS/ÂΜL (ref 1.85–7.62)
NEUTS SEG NFR BLD AUTO: 74 % (ref 43–75)
NRBC BLD AUTO-RTO: 0 /100 WBCS
PLATELET # BLD AUTO: 285 THOUSANDS/UL (ref 149–390)
PMV BLD AUTO: 10.1 FL (ref 8.9–12.7)
POTASSIUM SERPL-SCNC: 3.6 MMOL/L (ref 3.5–5.3)
PROT SERPL-MCNC: 6.8 G/DL (ref 6.4–8.4)
RBC # BLD AUTO: 4.09 MILLION/UL (ref 3.81–5.12)
SODIUM SERPL-SCNC: 139 MMOL/L (ref 135–147)
TSH SERPL DL<=0.05 MIU/L-ACNC: 0.53 UIU/ML (ref 0.45–4.5)
WBC # BLD AUTO: 8.74 THOUSAND/UL (ref 4.31–10.16)

## 2023-01-31 RX ORDER — BUDESONIDE AND FORMOTEROL FUMARATE DIHYDRATE 160; 4.5 UG/1; UG/1
AEROSOL RESPIRATORY (INHALATION)
Qty: 10.2 G | Refills: 5 | Status: SHIPPED | OUTPATIENT
Start: 2023-01-31

## 2023-01-31 NOTE — TELEPHONE ENCOUNTER
Contacted [employer] to inquire about status of oral nutrition supplement order that was faxed to them on 1/18/23  [facility] representative explains that they are still waiting for insurance approval/denial  They will contact this RD when more info is available

## 2023-01-31 NOTE — PROGRESS NOTES
Patient reports to infusion center for lab draw via [address] port today  Patient reports feeling well with no complaints, labs obtained via port with no difficulty  Patient tolerated procedure well with no adverse events  Patient aware of next appointment, AVS declined

## 2023-02-01 NOTE — TELEPHONE ENCOUNTER
Received call from Juan BAHENA today, [employer] representative informs that Juan is out of network with [facility]  New order sent to [facility] today, contacted [facility] prior to sending order who confirmed they are in network with [facility]  Will follow up with [facility] at the end of this week  [phone] and updated her

## 2023-02-01 NOTE — TELEPHONE ENCOUNTER
Received call from [place] inquiring about status of oral nutrition supplement order  Informed her that as of 1/31/23 [facility] is still waiting for approval/denial from [facility]

## 2023-02-03 ENCOUNTER — DOCUMENTATION (OUTPATIENT)
Dept: NUTRITION | Facility: CLINIC | Age: 38
End: 2023-02-03

## 2023-02-03 ENCOUNTER — HOSPITAL ENCOUNTER (OUTPATIENT)
Dept: INFUSION CENTER | Facility: CLINIC | Age: 38
End: 2023-02-03

## 2023-02-03 VITALS
DIASTOLIC BLOOD PRESSURE: 87 MMHG | HEART RATE: 100 BPM | WEIGHT: 185.2 LBS | RESPIRATION RATE: 18 BRPM | TEMPERATURE: 97.4 F | BODY MASS INDEX: 31.62 KG/M2 | SYSTOLIC BLOOD PRESSURE: 146 MMHG | HEIGHT: 64 IN

## 2023-02-03 DIAGNOSIS — C77.3 CARCINOMA OF RIGHT BREAST METASTATIC TO AXILLARY LYMPH NODE (HCC): Primary | ICD-10-CM

## 2023-02-03 DIAGNOSIS — Z95.828 PORT-A-CATH IN PLACE: ICD-10-CM

## 2023-02-03 DIAGNOSIS — C50.911 CARCINOMA OF RIGHT BREAST METASTATIC TO AXILLARY LYMPH NODE (HCC): Primary | ICD-10-CM

## 2023-02-03 RX ORDER — SODIUM CHLORIDE 9 MG/ML
20 INJECTION, SOLUTION INTRAVENOUS ONCE
Status: COMPLETED | OUTPATIENT
Start: 2023-02-03 | End: 2023-02-03

## 2023-02-03 RX ADMIN — CARBOPLATIN 175.2 MG: 10 INJECTION, SOLUTION INTRAVENOUS at 12:50

## 2023-02-03 RX ADMIN — FAMOTIDINE 20 MG: 10 INJECTION, SOLUTION INTRAVENOUS at 09:12

## 2023-02-03 RX ADMIN — SODIUM CHLORIDE 20 ML/HR: 0.9 INJECTION, SOLUTION INTRAVENOUS at 08:00

## 2023-02-03 RX ADMIN — PACLITAXEL 151.2 MG: 6 INJECTION, SOLUTION INTRAVENOUS at 10:47

## 2023-02-03 RX ADMIN — DIPHENHYDRAMINE HYDROCHLORIDE 25 MG: 50 INJECTION, SOLUTION INTRAMUSCULAR; INTRAVENOUS at 08:39

## 2023-02-03 RX ADMIN — DEXAMETHASONE SODIUM PHOSPHATE: 10 INJECTION, SOLUTION INTRAMUSCULAR; INTRAVENOUS at 08:06

## 2023-02-03 RX ADMIN — SODIUM CHLORIDE 200 MG: 9 INJECTION, SOLUTION INTRAVENOUS at 09:51

## 2023-02-03 NOTE — PROGRESS NOTES
Patient receiving Taxol and Carboplatin, tolerated infusion well without any adverse events  Patient aware of next appointment, Port de-accessed, AVS declined  Pt walked out of unit safely

## 2023-02-03 NOTE — PROGRESS NOTES
Contacted [employer] [facility] to inquire about status if oral nutrition supplement order that was sent to them on 2/1/22  [facility] [employer] informs that they received order and submitted the prior auth to [facility] [address] Prior Alysia August is still pending  Spoke with [place] in infusion and let her know update  She states that [facility] [employer] communicated with her on 2/1 to let her know that they received ONS order from [facility]

## 2023-02-06 ENCOUNTER — OFFICE VISIT (OUTPATIENT)
Dept: SURGICAL ONCOLOGY | Facility: CLINIC | Age: 38
End: 2023-02-06

## 2023-02-06 VITALS
SYSTOLIC BLOOD PRESSURE: 142 MMHG | BODY MASS INDEX: 31.58 KG/M2 | TEMPERATURE: 98.7 F | HEART RATE: 76 BPM | DIASTOLIC BLOOD PRESSURE: 74 MMHG | WEIGHT: 185 LBS | OXYGEN SATURATION: 96 % | RESPIRATION RATE: 16 BRPM | HEIGHT: 64 IN

## 2023-02-06 DIAGNOSIS — C50.911 CARCINOMA OF RIGHT BREAST METASTATIC TO AXILLARY LYMPH NODE (HCC): ICD-10-CM

## 2023-02-06 DIAGNOSIS — C50.919 TRIPLE NEGATIVE BREAST CANCER (HCC): Primary | ICD-10-CM

## 2023-02-06 DIAGNOSIS — C77.3 CARCINOMA OF RIGHT BREAST METASTATIC TO AXILLARY LYMPH NODE (HCC): ICD-10-CM

## 2023-02-06 NOTE — PROGRESS NOTES
Surgical Oncology Follow Up  [facility]  [facility]  [facility]  Magaly DAVILA [address] [facility]  1985  [id number]      Chief Complaint   Patient presents with   • Follow-up        Assessment & Plan:   Is here for follow-up after left breast biopsy and pathology is consistent with benign  She is receiving neoadjuvant chemotherapy for stage IIIb right breast cancer that she is tolerating fairly well  She has no major side effects from chemotherapy  I have encouraged her to increase her oral intake protein as well as keep hydrated well  She has significantly reduced her smoking and I encouraged her  Bilateral breast examination no palpable mass or masses  We will follow her in 3 months time  Cancer History:     Oncology History   Malignant neoplasm of overlapping sites of right female breast ([facility] ) (Resolved)   12/29/2022 Initial Diagnosis    Malignant neoplasm of overlapping sites of right female breast [facility])     Carcinoma of right breast metastatic to axillary lymph node ([facility] )   12/22/2022 -  Cancer Staged    Staging form: Breast, AJCC 8th Edition  - Clinical stage from 12/22/2022: Stage IIIB (cT3, cN1, cM0, G2, ER-, SC-, HER2-) - Signed by Anita Wisdom MD on 1/4/2023  Stage prefix: Initial diagnosis  Nuclear grade: G3  Mitotic count score: Score 1  Histologic grading system: 3 grade system  Menopausal status: Premenopausal       12/22/2022 Initial Diagnosis    Triple negative breast cancer ([facility] )     12/22/2022 Biopsy    Right breast ultrasound guided biopsy  A  2 o'clock, 6cm from nipple  Invasive mammary carcinoma of no special type (ductal NST/invasive ductal carcinoma)   Grade 2  ER 0  SC 0   HER2 0  Ki-67 50%    B  4 o'clock, 6cm from nipple  Invasive mammary carcinoma of no special type (ductal NST/invasive ductal carcinoma)   Grade 2   ER 0   SC 0   HER2 2+   FISH negative  Ki-67 40%     C   Right Axillary lymph node  Metastatic carcinoma consistent with mammary primary     12/29/2022 Initial Diagnosis    Carcinoma of right breast metastatic to axillary lymph node ([facility] )     12/30/2022 Genetic Testing    [facility]  A total of 36 genes were evaluated, including: FRANKO, BRCA1, BRCA2, CDH1, CHEK2, PALB2, PTEN, STK11, TP53  Negative result  No pathogenic sequence variants or deletions/duplications identified       1/13/2023 -  Chemotherapy    DOXOrubicin (ADRIAMYCIN), 60 mg/m2 = 113 mg, Intravenous, Once, 0 of 4 cycles  cyclophosphamide (CYTOXAN) IVPB, 600 mg/m2 = 1,134 mg, Intravenous, Once, 0 of 4 cycles  CARBOplatin (PARAPLATIN) IVPB (GO AUC DOSING), 201 9 mg (83 1 % of original dose 243 mg), Intravenous, Once, 2 of 4 cycles  Dose modification:   (original dose 243 mg, Cycle 1, Reason: Other (Must fill in a comment)),   (original dose 243 mg, Cycle 1)  Administration: 201 9 mg (1/13/2023), 206 7 mg (1/20/2023), 175 2 mg (2/3/2023), 206 7 mg (1/27/2023)  PACLItaxel (TAXOL) chemo IVPB, 80 mg/m2 = 151 2 mg, Intravenous, Once, 2 of 4 cycles  Administration: 151 2 mg (1/13/2023), 151 2 mg (1/20/2023), 151 2 mg (2/3/2023), 151 2 mg (1/27/2023)  aprepitant (CINVANTI) in  mL IVPB, 130 mg, Intravenous, Once, 0 of 4 cycles  pembrolizumab (KEYTRUDA) IVPB, 200 mg, Intravenous, Once, 2 of 17 cycles  Administration: 200 mg (1/13/2023), 200 mg (2/3/2023)     Triple negative breast cancer ([facility] )   12/22/2022 Initial Diagnosis    Triple negative breast cancer ([facility] )     12/22/2022 Biopsy    Right breast ultrasound guided biopsy  A  2 o'clock, 6cm from nipple  Invasive mammary carcinoma of no special type (ductal NST/invasive ductal carcinoma)   Grade 2  ER 0  IL 0   HER2 0  Ki-67 50%    B  4 o'clock, 6cm from nipple  Invasive mammary carcinoma of no special type (ductal NST/invasive ductal carcinoma)   Grade 2   ER 0   IL 0   HER2 2+   FISH negative  Ki-67 40%     C   Right Axillary lymph node  Metastatic carcinoma consistent with mammary primary     [facility]  A total of 36 genes were evaluated, including: FRANKO, BRCA1, BRCA2, CDH1, CHEK2, PALB2, PTEN, STK11, TP53  Negative result  No pathogenic sequence variants or deletions/duplications identified             Interval History:   Follow-up after left breast biopsy with treatment for right breast cancer    Review of Systems:   Review of Systems   Constitutional: Negative for chills and fever  HENT: Negative for ear pain and sore throat  Eyes: Negative for pain and visual disturbance  Respiratory: Negative for cough and shortness of breath  Cardiovascular: Negative for chest pain and palpitations  Gastrointestinal: Negative for abdominal pain and vomiting  Genitourinary: Negative for dysuria and hematuria  Musculoskeletal: Negative for arthralgias and back pain  Skin: Negative for color change and rash  Neurological: Negative for seizures and syncope  All other systems reviewed and are negative        Past Medical History     Patient Active Problem List   Diagnosis   • Hypertension   • Asthma   • Cervical radiculopathy   • Chronic bilateral low back pain   • DDD (degenerative disc disease), lumbar   • Binge eating disorder   • BMI 33 0-33 9,adult   • Pre-diabetes   • Cigarette nicotine dependence without complication   • Carcinoma of right breast metastatic to axillary lymph node (HCC)   • Triple negative breast cancer (HCC)   • Port-A-Cath in place     Past Medical History:   Diagnosis Date   • Asthma    • Hypertension      Past Surgical History:   Procedure Laterality Date   •  SECTION     • IR PORT PLACEMENT  2023   • MRI BREAST BIOPSY LEFT (ALL INCLUSIVE) Left 2023   • TUBAL LIGATION     • US GUIDANCE BREAST BIOPSY RIGHT EACH ADDITIONAL Right 2022   • US GUIDED BREAST BIOPSY RIGHT COMPLETE Right 2022   • US GUIDED BREAST LYMPH NODE BIOPSY RIGHT Right 2022     Family History   Problem Relation Age of Onset   • Lung cancer Mother 61   • Ovarian cancer Mother    • No Known Problems Daughter    • No Known Problems Daughter    • Skin cancer Maternal Grandmother    • Ovarian cancer Maternal Grandmother    • Prostate cancer Maternal Grandfather    • Breast cancer Maternal Aunt    • Ovarian cancer Maternal Aunt    • Breast cancer Paternal Aunt      Social History     Socioeconomic History   • Marital status: Single     Spouse name: Not on file   • Number of children: Not on file   • Years of education: Not on file   • Highest education level: Not on file   Occupational History   • Not on file   Tobacco Use   • Smoking status: Every Day     Packs/day: 0 50     Years: 15 00     Pack years: 7 50     Types: Cigarettes   • Smokeless tobacco: Never   Vaping Use   • Vaping Use: Never used   Substance and Sexual Activity   • Alcohol use: Not Currently   • Drug use: Never   • Sexual activity: Yes     Partners: Male     Birth control/protection: Female Sterilization   Other Topics Concern   • Not on file   Social History Narrative   • Not on file     Social Determinants of Health     Financial Resource Strain: Not on file   Food Insecurity: Not on file   Transportation Needs: Not on file   Physical Activity: Not on file   Stress: Not on file   Social Connections: Not on file   Intimate Partner Violence: Not on file   Housing Stability: Not on file       Current Outpatient Medications:   •  albuterol (PROVENTIL HFA,VENTOLIN HFA) 90 mcg/act inhaler, inhale 2 puffs by mouth and INTO THE LUNGS every 6 hours if needed for wheezing, Disp: 18 g, Rfl: 11  •  amLODIPine (NORVASC) 5 mg tablet, Take 2 tablets (10 mg total) by mouth daily, Disp: 60 tablet, Rfl: 2  •  fexofenadine (ALLEGRA) 180 MG tablet, Take 1 tablet (180 mg total) by mouth daily, Disp: 30 tablet, Rfl: 5  •  lidocaine-prilocaine (EMLA) cream, Apply topically as needed for mild pain, Disp: 30 g, Rfl: 0  •  montelukast (SINGULAIR) 10 mg tablet, take 1 tablet by mouth at bedtime, Disp: 30 tablet, Rfl: 5  •  ondansetron (ZOFRAN-ODT) 8 mg disintegrating tablet, Take 1 tablet (8 mg total) by mouth every 8 (eight) hours as needed for nausea or vomiting, Disp: 20 tablet, Rfl: 0  •  Symbicort 160-4 5 MCG/ACT inhaler, inhale 2 puffs by mouth twice a day Rinse mouth after use, Disp: 10 2 g, Rfl: 5  No current facility-administered medications for this visit  No Known Allergies    Physical Exam:     Vitals:    02/06/23 0929   BP: 142/74   Pulse: 76   Resp: 16   Temp: 98 7 °F (37 1 °C)   SpO2: 96%     Physical Exam  Constitutional:       Appearance: Normal appearance  HENT:      Head: Normocephalic and atraumatic  Nose: Nose normal       Mouth/Throat:      Mouth: Mucous membranes are moist    Eyes:      Pupils: Pupils are equal, round, and reactive to light  Cardiovascular:      Rate and Rhythm: Normal rate  Pulses: Normal pulses  Heart sounds: Normal heart sounds  Pulmonary:      Effort: Pulmonary effort is normal       Breath sounds: Normal breath sounds  Chest:      Comments: The bilateral Breast(s) were examined  There was not any sign of an inverted nipple, mass, nipple discharge, skin changes or tenderness  The bilateral  breast(s) were examined in the sitting and supine position  There are not any worrisome skin changes, tenderness, nipple changes, swelling ,bleeding or evidence of mass/s in all four quadrants  Mello survey demonstrated that there is not any evidence of any clinically suspicious axillary, pectoral or supraclavicular lymph nodes  Abdominal:      General: Bowel sounds are normal       Palpations: Abdomen is soft  Musculoskeletal:         General: Normal range of motion  Cervical back: Normal range of motion and neck supple  Skin:     General: Skin is warm  Neurological:      General: No focal deficit present  Mental Status: She is alert and oriented to person, place, and time     Psychiatric:         Mood and Affect: Mood normal          Behavior: Behavior normal          Thought Content: Thought content normal          Judgment: Judgment normal            Results & Discussion:   I did review most recent pathology in detail I did discussed in detail nature of breast neoadjuvant chemotherapy and its side effects  I will follow her in 3 months she understands and  agrees   All patient questions were answered  Advance Care Planning/Advance Directives: Klarissa Dorsey MD discussed the disease status with Bette Vargas  today 02/06/23  treatment plans and follow-up with the patient

## 2023-02-07 ENCOUNTER — HOSPITAL ENCOUNTER (OUTPATIENT)
Dept: INFUSION CENTER | Facility: CLINIC | Age: 38
Discharge: HOME | End: 2023-02-07

## 2023-02-07 DIAGNOSIS — L65.8 ALOPECIA DUE TO CYTOTOXIC DRUG: Primary | ICD-10-CM

## 2023-02-07 DIAGNOSIS — T45.1X5A ALOPECIA DUE TO CYTOTOXIC DRUG: Primary | ICD-10-CM

## 2023-02-07 DIAGNOSIS — Z95.828 PORT-A-CATH IN PLACE: Primary | ICD-10-CM

## 2023-02-07 DIAGNOSIS — C77.3 CARCINOMA OF RIGHT BREAST METASTATIC TO AXILLARY LYMPH NODE (HCC): ICD-10-CM

## 2023-02-07 DIAGNOSIS — C50.911 CARCINOMA OF RIGHT BREAST METASTATIC TO AXILLARY LYMPH NODE (HCC): ICD-10-CM

## 2023-02-07 LAB
ALBUMIN SERPL BCP-MCNC: 3.7 G/DL (ref 3.5–5)
ALP SERPL-CCNC: 76 U/L (ref 46–116)
ALT SERPL W P-5'-P-CCNC: 34 U/L (ref 12–78)
ANION GAP SERPL CALCULATED.3IONS-SCNC: 7 MMOL/L (ref 4–13)
AST SERPL W P-5'-P-CCNC: 19 U/L (ref 5–45)
BASOPHILS # BLD AUTO: 0.04 THOUSANDS/ÂΜL (ref 0–0.1)
BASOPHILS NFR BLD AUTO: 1 % (ref 0–1)
BILIRUB SERPL-MCNC: 0.32 MG/DL (ref 0.2–1)
BUN SERPL-MCNC: 9 MG/DL (ref 5–25)
CALCIUM SERPL-MCNC: 9.4 MG/DL (ref 8.3–10.1)
CHLORIDE SERPL-SCNC: 102 MMOL/L (ref 96–108)
CO2 SERPL-SCNC: 27 MMOL/L (ref 21–32)
CREAT SERPL-MCNC: 0.51 MG/DL (ref 0.6–1.3)
EOSINOPHIL # BLD AUTO: 0.13 THOUSAND/ÂΜL (ref 0–0.61)
EOSINOPHIL NFR BLD AUTO: 2 % (ref 0–6)
ERYTHROCYTE [DISTWIDTH] IN BLOOD BY AUTOMATED COUNT: 13 % (ref 11.6–15.1)
GFR SERPL CREATININE-BSD FRML MDRD: 121 ML/MIN/1.73SQ M
GLUCOSE SERPL-MCNC: 114 MG/DL (ref 65–140)
HCT VFR BLD AUTO: 36.5 % (ref 34.8–46.1)
HGB BLD-MCNC: 12.4 G/DL (ref 11.5–15.4)
IMM GRANULOCYTES # BLD AUTO: 0.02 THOUSAND/UL (ref 0–0.2)
IMM GRANULOCYTES NFR BLD AUTO: 0 % (ref 0–2)
LYMPHOCYTES # BLD AUTO: 2.68 THOUSANDS/ÂΜL (ref 0.6–4.47)
LYMPHOCYTES NFR BLD AUTO: 38 % (ref 14–44)
MCH RBC QN AUTO: 31.6 PG (ref 26.8–34.3)
MCHC RBC AUTO-ENTMCNC: 34 G/DL (ref 31.4–37.4)
MCV RBC AUTO: 93 FL (ref 82–98)
MONOCYTES # BLD AUTO: 0.21 THOUSAND/ÂΜL (ref 0.17–1.22)
MONOCYTES NFR BLD AUTO: 3 % (ref 4–12)
NEUTROPHILS # BLD AUTO: 3.94 THOUSANDS/ÂΜL (ref 1.85–7.62)
NEUTS SEG NFR BLD AUTO: 56 % (ref 43–75)
NRBC BLD AUTO-RTO: 0 /100 WBCS
PLATELET # BLD AUTO: 284 THOUSANDS/UL (ref 149–390)
PMV BLD AUTO: 9.5 FL (ref 8.9–12.7)
POTASSIUM SERPL-SCNC: 3.9 MMOL/L (ref 3.5–5.3)
PROT SERPL-MCNC: 6.9 G/DL (ref 6.4–8.4)
RBC # BLD AUTO: 3.92 MILLION/UL (ref 3.81–5.12)
SODIUM SERPL-SCNC: 136 MMOL/L (ref 135–147)
WBC # BLD AUTO: 7.02 THOUSAND/UL (ref 4.31–10.16)

## 2023-02-10 ENCOUNTER — TELEPHONE (OUTPATIENT)
Dept: HEMATOLOGY ONCOLOGY | Facility: CLINIC | Age: 38
End: 2023-02-10

## 2023-02-10 ENCOUNTER — HOSPITAL ENCOUNTER (OUTPATIENT)
Dept: INFUSION CENTER | Facility: CLINIC | Age: 38
Discharge: HOME | End: 2023-02-10

## 2023-02-10 VITALS
TEMPERATURE: 97.7 F | BODY MASS INDEX: 31.79 KG/M2 | HEART RATE: 108 BPM | SYSTOLIC BLOOD PRESSURE: 150 MMHG | WEIGHT: 186.2 LBS | DIASTOLIC BLOOD PRESSURE: 79 MMHG | HEIGHT: 64 IN | RESPIRATION RATE: 18 BRPM

## 2023-02-10 DIAGNOSIS — L65.8 ALOPECIA DUE TO CYTOTOXIC DRUG: Primary | ICD-10-CM

## 2023-02-10 DIAGNOSIS — C50.919 TRIPLE NEGATIVE BREAST CANCER (HCC): ICD-10-CM

## 2023-02-10 DIAGNOSIS — C77.3 CARCINOMA OF RIGHT BREAST METASTATIC TO AXILLARY LYMPH NODE (HCC): Primary | ICD-10-CM

## 2023-02-10 DIAGNOSIS — C77.3 CARCINOMA OF RIGHT BREAST METASTATIC TO AXILLARY LYMPH NODE (HCC): ICD-10-CM

## 2023-02-10 DIAGNOSIS — C50.911 CARCINOMA OF RIGHT BREAST METASTATIC TO AXILLARY LYMPH NODE (HCC): ICD-10-CM

## 2023-02-10 DIAGNOSIS — C50.911 CARCINOMA OF RIGHT BREAST METASTATIC TO AXILLARY LYMPH NODE (HCC): Primary | ICD-10-CM

## 2023-02-10 DIAGNOSIS — T45.1X5A ALOPECIA DUE TO CYTOTOXIC DRUG: Primary | ICD-10-CM

## 2023-02-10 RX ORDER — SODIUM CHLORIDE 9 MG/ML
20 INJECTION, SOLUTION INTRAVENOUS ONCE
Status: COMPLETED | OUTPATIENT
Start: 2023-02-10 | End: 2023-02-10

## 2023-02-10 RX ADMIN — PACLITAXEL 151.2 MG: 6 INJECTION, SOLUTION INTRAVENOUS at 10:18

## 2023-02-10 RX ADMIN — CARBOPLATIN 206.7 MG: 10 INJECTION, SOLUTION INTRAVENOUS at 12:27

## 2023-02-10 RX ADMIN — DEXAMETHASONE SODIUM PHOSPHATE: 10 INJECTION, SOLUTION INTRAMUSCULAR; INTRAVENOUS at 08:51

## 2023-02-10 RX ADMIN — FAMOTIDINE 20 MG: 10 INJECTION, SOLUTION INTRAVENOUS at 09:35

## 2023-02-10 RX ADMIN — SODIUM CHLORIDE 20 ML/HR: 0.9 INJECTION, SOLUTION INTRAVENOUS at 08:50

## 2023-02-10 RX ADMIN — DIPHENHYDRAMINE HYDROCHLORIDE 25 MG: 50 INJECTION, SOLUTION INTRAMUSCULAR; INTRAVENOUS at 09:12

## 2023-02-10 NOTE — PROGRESS NOTES
Pt reports to unit for Taxol and Carboplatin, uipon arrival states she has had some scalp burning for the past few days, Rayna Myrick RN made aware, made MD aware of symptom  Patient tolerated infusions well without any adverse events, positive blood return throughout  Patient aware of next appointment, port de-accessed, AVS declined

## 2023-02-10 NOTE — TELEPHONE ENCOUNTER
Received notification from infusion RN   Patient reports scalp burning as if it was like a "sunburn "

## 2023-02-13 NOTE — PROGRESS NOTES
Hematology/Oncology Outpatient Office Note    Date of Service: 2023     Rue Shaheen Barbosa    Reason for Consultation:   Chief Complaint   Patient presents with   • Follow-up     3-4 wk f/u labs tx        Cancer Stage at diagnosis: IIIB    Referral Physician: No ref  provider found    Primary Care Physician:  Ashly Gautam PA-C     Nickname: Kimberly Ann    Spouse: Mandeep    Original ECO    Today's ECO     Goals and Barriers:  Current Goal: Minimize effects of disease burden, extend life  Barriers to accomplishing this: None    Patient's Capacity to Self Care:  Patient is able to self care    Code Status: not addressed today    Advanced directives: not addressed today    ASSESSMENT & PLAN      Diagnosis ICD-10-CM Associated Orders   1  Carcinoma of right breast metastatic to axillary lymph node [facility])  C50 911     C77 3             This is a 42 y o  c PMHx notable for asthma, HTN, being seen in consultation for locally advanced triple negative breast cancer        Infusion reaction C1D8 to Taxol, increased Dex to 20mg in premed and has been tolerated better since then  Discussion of decision making  • Oncology history updated, accordingly, during this visit  • Goals of care/patient communication  o I discussed with the patient the clinical course leading up to their cancer diagnosis  I reviewed relevant office notes, imaging reports and pathology result as well   o I told the patient that this is a case of curable disease and what this means  We discussed that the goal of anti-cancer therapy is to provide best quality of life, extend overall survival, and progression free survival as shown in clinical trials   We also discussed that there might be a point when the cancer will no longer respond to this anti-neoplastic therapy   o I explained the risks/benefits of the proposed cancer therapy: Pembro + Carbo-Taxol for 4 cycles then 4 cycles of Pembro + adriamycin/cytoxan followed by surgery and then maintenance Pembro (for 9 cycles) and after discussion including understanding risks of possible life-threatening complications and therapy-related malignancy development, informed consent for blood products and treatment has been signed and obtained     • TNM/Staging At Diagnosis  o [id number]  Cancer Staging   IIIB  • Disease Features/Tumor Markers/Genetics  o Tumor Marker: n/a  o Notable Path Features: 12/22/2022: Invasive mammary carcinoma, suma grade 2, nuclear grade 3  ER/WI/HER-2 0, Ki-67 50%, nuclear grade 3  • Treatment: neoadjuvant Pembro-Carbo-taxol followed by [facility], then surgery, then maintenance Pembro  • Other Supportive care: Neulasta  • Treatment Team Members  o Surgeon: Dr Valentina Ji  o 1/24/23: WBC 11 22, Hgb 13, MCV 92, PLT 323K, diff normal  CMP normal    • Diagnostics  12/15/2022 Right Mammogram diagnostic bilateral w 3d & cad: There is an ill-defined approximately 5 cm focal asymmetry seen in the inner central region of the right breast in the anterior depth  The asymmetry correlates with the palpable mass reported by the patient  US breast right limited (diagnostic): Within the area of palpable concern, there are multiple ill-defined hypoechoic masses  Biopsy of the 17 mm x 13 mm x 23 mm irregularly shaped, hypoechoic mass with indistinct margins in the right breast at 2 o'clock, 6 cm from the nipple and 20 mm x 7 mm x 28 mm irregularly shaped, hypoechoic mass with indistinct margins seen in the right breast at 4 o'clock, 6 cm from the nipple are recommended  US breast right limited (diagnostic): A lymph node with focally thickened cortex is noted in the right axilla  12/29/2022 CT CAP w/c: Multiple small lung nodules  These may be inflammatory though a three-month follow-up is recommended to completely exclude metastasis  Unremarkable abdomen and pelvis without evidence of metastatic disease   Minimal paraseptal emphysema at the apices  1/4/2023 Nuc Bone scan: No scintigraphic evidence of osseous metastasis  1/9/2023: TTE LVEF 60%      We discussed possibility of infertility on chemo on 1/4/2023 but she has deferred fertility referral as she is not interested in having more children  Discussion of decision making    I personally reviewed the following lab results, the image studies, pathology, other specialty/physicians consult notes and recommendations, and outside medical records from [facility]  I had a lengthy discussion with the patient and shared the work-up findings  We discussed the diagnosis and management plan as below  I spent 46 minutes reviewing the records (labs, clinician notes, outside records, medical history, ordering medicine/tests/procedures, interpreting the imaging/labs previously done) and coordination of care as well as direct time with the patient today, of which greater than 50% of the time was spent in counseling and coordination of care with the patient/family  · Plan/Labs  · C3 Carbo-taxol coming up with preceding labs  ? Plan --> Neoadjuvant Pembro-Carbo-taxol for 4 cycles then 4 cycles of Pembro + adriamycin/cytoxan followed by surgery  After surgery, plan is for maintenance Pembro for 9 cycles  Labs preceding chemo/immunotherapy to be completed with CBC, CMP, TSH  · Zofran 8mg q8 prn nausea/vomiting previously sent home  · F/u Surg Onc for eventual goal of surgery  · F/u genetics counselor testing      Follow Up: q3 weeks for 8 total visits alternating between MD & PA-C scheduled thru 5/19/2023, will order more until 12/15/2023    All questions were answered to the patient's satisfaction during this encounter  The patient knows the contact information for our office and knows to reach out for any relevant concerns related to this encounter   They are to call for any temperature 100 4 or higher, new symptoms including but not restricted to shaking chills, decreased appetite, nausea, vomiting, diarrhea, increased fatigue, shortness of breath or chest pain, confusion, and not feeling the strength to come to the clinic  For all other listed problems and medical diagnosis in their chart - they are managed by PCP and/or other specialists, which the patient acknowledges  Thank you very much for your consultation and making us a part of this patient's care  We are continuing to follow closely with you  Please do not hesitate to reach out to me with any additional questions or concerns  Alyssia Yan MD  Hematology & Medical Oncology Staff Physician          Disclaimer: This document was prepared using [employer] Direct technology  If a word or phrase is confusing, or does not make sense, this is likely due to recognition error which was not discovered during this clinician's review  If you believe an error has occurred, please contact me through [address] [place] line for regina? cation        ONCOLOGY HISTORY OF PRESENT ILLNESS        Oncology History   Malignant neoplasm of overlapping sites of right female breast ([facility] [address] ) (Resolved)   12/29/2022 Initial Diagnosis    Malignant neoplasm of overlapping sites of right female breast [facility])     Carcinoma of right breast [facility]   12/22/2022 -  Cancer Staged    Staging form: Breast, AJCC 8th Edition  - Clinical stage from 12/22/2022: Stage IIIB (cT3, cN1, cM0, G2, ER-, DC-, HER2-) - Signed by Alyssia Yan MD on 1/4/2023  Stage prefix: Initial diagnosis  Nuclear grade: G3  Mitotic count score: Score 1  Histologic grading system: 3 grade system  Menopausal status: Premenopausal       12/22/2022 Initial Diagnosis    Triple negative breast cancer ([facility] [address] )     12/22/2022 Biopsy    Right breast ultrasound guided biopsy  A  2 o'clock, 6cm from nipple  Invasive mammary carcinoma of no special type (ductal NST/invasive ductal carcinoma)   Grade 2  ER 0  DC 0   HER2 0  Ki-67 50%    B  4 o'clock, 6cm from nipple  Invasive mammary carcinoma of no special type (ductal NST/invasive ductal carcinoma)   Grade 2   ER 0   DC 0   HER2 2+   FISH negative  Ki-67 40%     C  Right Axillary lymph node  Metastatic carcinoma consistent with mammary primary     12/29/2022 Initial Diagnosis    [facility]arcin[facility] of right breast [facility]     12/30/2022 Genetic Testing    Ambry  A total of 36 genes were evaluated, including: FRANKO, BRCA1, BRCA2, CDH1, CHEK2, PALB2, PTEN, STK11, TP53  Negative result   No pathogenic sequence variants or deletions/duplications identified       1/13/2023 -  Chemotherapy    DOXOrubicin (ADRIAMYCIN), 60 mg/m2 = 113 mg, Intravenous, Once, 0 of 4 cycles  cyclophosphamide (CYTOXAN) IVPB, 600 mg/m2 = 1,134 mg, Intravenous, Once, 0 of 4 cycles  CARBOplatin (PARAPLATIN) IVPB (GO AUC DOSING), 201 9 mg (83 1 % of original dose 243 mg), Intravenous, Once, 2 of 4 cycles  Dose modification:   (original dose 243 mg, Cycle 1, Reason: Other (Must fill in a comment)),   (original dose 243 mg, Cycle 1)  Administration: 201 9 mg (1/13/2023), 206 7 mg (1/20/2023), 175 2 mg (2/3/2023), 206 7 mg (1/27/2023), 206 7 mg (2/10/2023), 206 7 mg (2/17/2023)  PACLItaxel (TAXOL) chemo IVPB, 80 mg/m2 = 151 2 mg, Intravenous, Once, 2 of 4 cycles  Administration: 151 2 mg (1/13/2023), 151 2 mg (1/20/2023), 151 2 mg (2/3/2023), 151 2 mg (1/27/2023), 151 2 mg (2/10/2023), 151 2 mg (2/17/2023)  aprepitant (CINVANTI) in  mL IVPB, 130 mg, Intravenous, Once, 0 of 4 cycles  pembrolizumab (KEYTRUDA) IVPB, 200 mg, Intravenous, Once, 2 of 17 cycles  Administration: 200 mg (1/13/2023), 200 mg (2/3/2023)     Triple negative breast cancer ([address] )   12/22/2022 Initial Diagnosis    Triple negative breast cancer ([facility] [address] )     12/22/2022 Biopsy    Right breast ultrasound guided biopsy  A  2 o'clock, 6cm from nipple  Invasive mammary carcinoma of no special type (ductal NST/invasive ductal carcinoma)   Grade 2  ER 0  DC 0   HER2 0  Ki-67 50%    B  4 o'clock, 6cm from nipple  Invasive mammary carcinoma of no special type (ductal NST/invasive ductal carcinoma)   Grade 2   ER 0   [place] 0   HER2 2+   FISH negative  Ki-67 40%     C  Right Axillary lymph node  Metastatic carcinoma consistent with mammary primary     12/30/2022 Genetic Testing    Ambry  A total of 36 genes were evaluated, including: FRANKO, BRCA1, BRCA2, CDH1, CHEK2, PALB2, PTEN, STK11, TP53  Negative result  No pathogenic sequence variants or deletions/duplications identified             SUBJECTIVE  (INTERVAL HISTORY)        Interval History   Having sharp twisting sensation in the body  No new HA, vision changes, CP, SOB, rashes, Abd pain, N/V/D  No bleeding anywhere  I have reviewed the relevant past medical, surgical, social and family history  I have also reviewed allergies and medications for this patient  Review of Systems  Review of Systems   Constitutional: Negative for chills, decreased appetite, diaphoresis, fever, malaise/fatigue, night sweats, weight gain and weight loss  HENT: Negative for nosebleeds  Eyes: Negative for blurred vision, double vision, vision loss in left eye and vision loss in right eye  Cardiovascular: Negative for chest pain, cyanosis, dyspnea on exertion, irregular heartbeat, leg swelling and palpitations  Respiratory: Negative for cough, hemoptysis, shortness of breath, sleep disturbances due to breathing, snoring, sputum production and wheezing  Endocrine: Negative for cold intolerance  Hematologic/Lymphatic: Negative for adenopathy and bleeding problem  Does not bruise/bleed easily  Skin: Negative for color change, dry skin, flushing, nail changes, poor wound healing, rash, skin cancer and suspicious lesions  Musculoskeletal: Negative for arthritis, back pain, joint pain, muscle cramps and muscle weakness     Genitourinary: Negative for bladder incontinence, frequency, hematuria, hesitancy, incomplete emptying, menorrhagia, pelvic pain and urgency  Neurological: Negative for difficulty with concentration, excessive daytime sleepiness, dizziness, light-headedness, loss of balance and weakness  Psychiatric/Behavioral: Negative for altered mental status  The patient is nervous/anxious  A 10-point review of system was performed, pertinent positive and negative were detailed as above  Otherwise, the 10-point review of system was negative        Past Medical History:   Diagnosis Date   • Asthma    • Hypertension        Past Surgical History:   Procedure Laterality Date   •  SECTION     • IR PORT PLACEMENT  2023   • MRI BREAST BIOPSY LEFT (ALL INCLUSIVE) Left 2023   • TUBAL LIGATION     • US GUIDANCE BREAST BIOPSY RIGHT EACH ADDITIONAL Right 2022   • US GUIDED BREAST BIOPSY RIGHT COMPLETE Right 2022   • US GUIDED BREAST LYMPH NODE BIOPSY RIGHT Right 2022       Family History   Problem Relation Age of Onset   • Lung cancer Mother 61   • Ovarian cancer Mother    • No Known Problems Daughter    • No Known Problems Daughter    • Skin cancer Maternal Grandmother    • Ovarian cancer Maternal Grandmother    • Prostate cancer Maternal Grandfather    • Breast cancer Maternal Aunt    • Ovarian cancer Maternal Aunt    • Breast cancer Paternal Aunt        Social History     Socioeconomic History   • Marital status: Single     Spouse name: Not on file   • Number of children: Not on file   • Years of education: Not on file   • Highest education level: Not on file   Occupational History   • Not on file   Tobacco Use   • Smoking status: Every Day     Packs/day: 0 50     Years: 15 00     Pack years: 7 50     Types: Cigarettes   • Smokeless tobacco: Never   Vaping Use   • Vaping Use: Never used   Substance and Sexual Activity   • Alcohol use: Not Currently   • Drug use: Never   • Sexual activity: Yes     Partners: Male     Birth control/protection: Female Sterilization   Other Topics Concern   • Not on file Social History Narrative   • Not on file     Social Determinants of Health     Financial Resource Strain: Not on file   Food Insecurity: Not on file   Transportation Needs: Not on file   Physical Activity: Not on file   Stress: Not on file   Social Connections: Not on file   Intimate Partner Violence: Not on file   Housing Stability: Not on file       No Known Allergies    Current Outpatient Medications   Medication Sig Dispense Refill   • albuterol (PROVENTIL HFA,VENTOLIN HFA) 90 mcg/act inhaler inhale 2 puffs by mouth and INTO THE LUNGS every 6 hours if needed for wheezing 18 g 11   • amLODIPine (NORVASC) 5 mg tablet Take 2 tablets (10 mg total) by mouth daily 60 tablet 2   • CRANIAL PROSTHESIS, RX, Apply to area of concern for alopecia as needed  1 each 0   • fexofenadine (ALLEGRA) 180 MG tablet Take 1 tablet (180 mg total) by mouth daily 30 tablet 5   • lidocaine-prilocaine (EMLA) cream Apply topically as needed for mild pain 30 g 0   • melatonin 3 mg Take 1 tablet (3 mg total) by mouth daily at bedtime 30 tablet 5   • senna-docusate sodium (SENOKOT-S) 8 6-50 mg per tablet Take 1 tablet by mouth daily 30 tablet 5   • Symbicort 160-4 5 MCG/ACT inhaler inhale 2 puffs by mouth twice a day Rinse mouth after use 10 2 g 5   • montelukast (SINGULAIR) 10 mg tablet take 1 tablet by mouth at bedtime (Patient not taking: Reported on 2/14/2023) 30 tablet 5   • ondansetron (ZOFRAN-ODT) 8 mg disintegrating tablet Take 1 tablet (8 mg total) by mouth every 8 (eight) hours as needed for nausea or vomiting (Patient not taking: Reported on 2/14/2023) 20 tablet 0     No current facility-administered medications for this visit  (Not in a hospital admission)      Objective:     24 Hour Vitals Assessment:     Vitals:    02/23/23 0839   BP: 142/84   Pulse: 99   Resp: 18   Temp: 98 7 °F (37 1 °C)   SpO2: 95%       PHYSICIAN EXAM:    General: Appearance: alert, cooperative, no distress  HEENT: Normocephalic, atraumatic   No scleral icterus  conjunctivae clear  EOMI  Chest: No tenderness to palpation  No open wound noted  Port in place  Lungs: Clear to auscultation bilaterally, Respirations unlabored  Cardiac: Regular rate and rhythm, +S1and S2  Abdomen: Soft, non-tender, non-distended  Bowel sounds are normal   Breast: L breast unremarkable, R breast with 5x4 cm mass over the R [place]-quadrant of the breast, no inverted nipple  Extremities:  No edema, cyanosis, clubbing  Skin: Skin color, turgor are normal  No rashes  Lymphatics: no palpable supra-cervical, axillary, or inguinal adenopathy  Neurologic: Awake, Alert, and oriented, no gross focal deficits noted b/l  DATA REVIEW:    Pathology Result:    Final Diagnosis   Date Value Ref Range Status   01/20/2023   Final    A  Left breast, 3:00 (MRI-guided core biopsy, 12 passes):     - Benign breast tissue with fibrocystic change  - Negative for atypia and malignancy in sampled material     Comment:  Clinical / radiologic correlation is recommended to ensure adequate sampling of the targeted lesion  12/22/2022   Final    A  Ultrasound biopsy right breast 2:00, 6 cm from nipple, 4 passes 14-gauge marquee:      - Invasive mammary carcinoma of no special type (ductal NST/invasive ductal carcinoma), see note      - Laverne grade 2 of 3 (total score: 7 of 9)          - Tubule formation: <10%, score 3          - Nuclear grade 3 of 3, score 3          - Mitoses < 3/mm2, (</= 7 mitoses/10HPF), score 1      - Invasive carcinoma involves 4 of 4 submitted core biopsies, max  dimension = 16 millimeters      - Ductal carcinoma in situ (DCIS): Not identified      - Lymphovascular invasion: Not identified      - Microcalcifications: Absent      - See synoptic report A    B   Right breast ultrasound biopsy at 4:00, 6 cm from nipple, 4 passes, 14-gauge marquee:      - Invasive mammary carcinoma of no special type (ductal NST/invasive ductal carcinoma) with apocrine features, see note - Redfield grade 2 of 3 (total score: 6 of 9)          - Tubule formation: 10% to 75%, score 2          - Nuclear grade 3 of 3, score 3          - Mitoses < 3/mm2, (</= 7 mitoses/10HPF), score 1      - Invasive carcinoma involves 4 of 4 submitted core biopsies, max  dimension = 15 millimeters      - Ductal carcinoma in situ (DCIS): Not identified      - Lymphovascular invasion: Not identified      - Microcalcifications: Absent      - See synoptic report B    C  Ultrasound right breast biopsy and axillary lymph node, 4 passes 14-gauge marquee:      - Metastatic carcinoma, consistent with mammary primary, see note           Image Results:   Image result are reviewed and documented in Hematology/Oncology history    MRI breast biopsy left (all inclusive)  Addendum: ADDENDUM:     PATHOLOGY RESULTS:    D) Non-mass Enhancement (Left; 3 o'clock; Middle; 2 5 cm from nipple)   Benign finding: the pathology findings indicate benign breast tissue and  fibrocystic change  This is concordant with imaging  Surgical consultation for biopsy confirmed malignancy in the right breast  recommended  RECOMMENDATION:        - Return to Routine Screening for the left breast         - Surgical Consultation for the right breast      END ADDENDUM  Narrative: DIAGNOSIS: Triple negative breast cancer ([facility] [address] )    TECHNIQUE:  MRI of the breast was performed in axial and sagittal planes utilizing a   combination of sagittal dynamic T1 pre- and post contrast imaging with fat   suppression in addition to axial post contrast fat saturated T1  Intravenous contrast was administered at 2cc/sec, without documented   contrast reaction  Prior to the procedure, previous imaging was reviewed  The procedure was   explained to the patient in detail  All questions were answered  Written   and verbal informed consent was obtained      MEDICATIONS ADMINISTERED:        INDICATION: Matt Carmona is a 42 y o  female presenting for [facility] non-mass enhancement 3:00 left breast     FINDINGS:   LEFT  D) NON-MASS ENHANCEMENT: Images of the left breast non-mass enhancement at   3 o'clock in the middle portion, 2 5 cm from the nipple were obtained  The   patient was placed prone on the biopsy table and a timeout was performed  A core needle biopsy was performed under MRI guidance using a lateral   approach  The skin was prepped in the usual fashion  Anesthesia was   administered using 5 mL of lidocaine 1% and 10 mL of lidocaine 1% with   epinephrine  A 12 G device was advanced  A clip was inserted into the target area  A stoplight biopsy clip marker   is placed in the target area  Post-placement MR imaging demonstrates the   clip was at the site  The patient tolerated the procedure well  There were   no immediate complications  Impression:    MRI guided biopsy for clumped non-mass enhancement 3:00 left breast     RECOMMENDATION:       - Waiting for pathology for the left breast      Workstation ID: [id number]      LABS:  Lab data are reviewed and documented in HemOnc history         Lab Results   Component Value Date    HGB 12 6 02/21/2023    HCT 37 1 02/21/2023    MCV 95 02/21/2023     02/21/2023    WBC 4 76 02/21/2023    NRBC 0 02/21/2023     Lab Results   Component Value Date    K 4 2 02/21/2023     02/21/2023    CO2 26 02/21/2023    BUN 15 02/21/2023    CREATININE 0 64 02/21/2023    GLUF 105 (H) 02/21/2023    CALCIUM 9 6 02/21/2023    CORRECTEDCA 9 3 01/17/2023    AST 21 02/21/2023    ALT 40 02/21/2023    ALKPHOS 81 02/21/2023    EGFR 112 02/21/2023       No results found for: IRON, TIBC, FERRITIN    No results found for: [id number]    Recent Labs     02/21/23  0912   WBC 4 76       By:  Gayla Zendejas MD, 2/23/2023, 8:49 AM

## 2023-02-13 NOTE — PATIENT INSTRUCTIONS
PRESCRIPTION REFILL REMINDER:  All medication refills should be requested prior to [facility] on Friday  Any refill requests after noon on Friday would be addressed the following Monday  Please protect yourself from [place]   = Wash your hands  Soap and water, or hand  with at least 60% alcohol, are both effective at killing the virus  = Wear a mask  This will help protect others from any virus particles you might spread  Your mouth and nose BOTH need to be covered  = Keep the distance  Keep 6 feet of distance from other people, even if they seem healthy  Keeping distance protects you from the other person's virus spread     = Get a vaccine, and boost it  Three vaccines are approved for use in the [place] for all adults  These vaccines do provide protection against all known variants, and the new 'bi-valent' booster is predicted to be more protective against Omicron variants   + Pfizer is FDA approved for all persons age 11 and older   + Therman Lul may be given to all person age 25 and older   - We do NOT recommend [address] vaccine for our Palliative Care patients  - [facility] is no longer offering regular COVID vaccine clinics  You may ask your primary doctor for help and advice  = you may also visit the CDC's complete list of approved sites for new vaccines: Vaccines  gov - Vaccine Location Search Results   (You may also visit Vaccines  gov and search for 'Bi-valent booster' in your zip code )  = [address] [facility]), [employer], [employer], [employer], and many [facility] locations ALL have shots   + The CDC recommends booster shots on ALL vaccines for ALL adults  = Test to treat  If you have symptoms, get tested, and get treatment!   New drugs like Paxlovid can prevent you from ever having to go to the hospital , and may be covered completely by your insurance or special government programs    - https://aspr hhs gov/TestToTreat/ Informacion en espanol sobre vacunas, de nos companeros de [facility] --  Mitra mckeon    Check out [employer] for Khari data that are updated daily:    http://www [employer]/     Global Epidemics  Org, from [facility] (OUTPATIENT CAMPUS), will give you Wyfjsw-rr-Ytmtxl information on virus cases and vaccination rates:    Https://globalepidemics  org/    Frequently Asked Questions about COVID, answered by [facility]    [employer] es

## 2023-02-14 ENCOUNTER — HOSPITAL ENCOUNTER (OUTPATIENT)
Dept: INFUSION CENTER | Facility: CLINIC | Age: 38
Discharge: HOME | End: 2023-02-14

## 2023-02-14 ENCOUNTER — CONSULT (OUTPATIENT)
Dept: PALLIATIVE MEDICINE | Facility: CLINIC | Age: 38
End: 2023-02-14

## 2023-02-14 VITALS
OXYGEN SATURATION: 95 % | HEIGHT: 64 IN | TEMPERATURE: 97.5 F | BODY MASS INDEX: 32.42 KG/M2 | RESPIRATION RATE: 16 BRPM | SYSTOLIC BLOOD PRESSURE: 112 MMHG | DIASTOLIC BLOOD PRESSURE: 60 MMHG | HEART RATE: 100 BPM | WEIGHT: 189.9 LBS

## 2023-02-14 DIAGNOSIS — Z95.828 PORT-A-CATH IN PLACE: ICD-10-CM

## 2023-02-14 DIAGNOSIS — R11.0 NAUSEA: ICD-10-CM

## 2023-02-14 DIAGNOSIS — L65.8 ALOPECIA DUE TO CYTOTOXIC DRUG: ICD-10-CM

## 2023-02-14 DIAGNOSIS — C50.919 TRIPLE NEGATIVE BREAST CANCER (HCC): ICD-10-CM

## 2023-02-14 DIAGNOSIS — C50.911 CARCINOMA OF RIGHT BREAST METASTATIC TO AXILLARY LYMPH NODE (HCC): Primary | ICD-10-CM

## 2023-02-14 DIAGNOSIS — T45.1X5A ALOPECIA DUE TO CYTOTOXIC DRUG: ICD-10-CM

## 2023-02-14 DIAGNOSIS — C77.3 CARCINOMA OF RIGHT BREAST METASTATIC TO AXILLARY LYMPH NODE (HCC): Primary | ICD-10-CM

## 2023-02-14 DIAGNOSIS — G47.00 INSOMNIA: ICD-10-CM

## 2023-02-14 DIAGNOSIS — K59.00 CONSTIPATION: ICD-10-CM

## 2023-02-14 DIAGNOSIS — Z51.5 PALLIATIVE CARE PATIENT: ICD-10-CM

## 2023-02-14 LAB
ALBUMIN SERPL BCP-MCNC: 3.6 G/DL (ref 3.5–5)
ALP SERPL-CCNC: 76 U/L (ref 46–116)
ALT SERPL W P-5'-P-CCNC: 54 U/L (ref 12–78)
ANION GAP SERPL CALCULATED.3IONS-SCNC: 10 MMOL/L (ref 4–13)
AST SERPL W P-5'-P-CCNC: 25 U/L (ref 5–45)
BASOPHILS # BLD AUTO: 0.02 THOUSANDS/ÂΜL (ref 0–0.1)
BASOPHILS NFR BLD AUTO: 0 % (ref 0–1)
BILIRUB SERPL-MCNC: 0.4 MG/DL (ref 0.2–1)
BUN SERPL-MCNC: 10 MG/DL (ref 5–25)
CALCIUM SERPL-MCNC: 8.7 MG/DL (ref 8.3–10.1)
CHLORIDE SERPL-SCNC: 104 MMOL/L (ref 96–108)
CO2 SERPL-SCNC: 25 MMOL/L (ref 21–32)
CREAT SERPL-MCNC: 0.49 MG/DL (ref 0.6–1.3)
EOSINOPHIL # BLD AUTO: 0.08 THOUSAND/ÂΜL (ref 0–0.61)
EOSINOPHIL NFR BLD AUTO: 1 % (ref 0–6)
ERYTHROCYTE [DISTWIDTH] IN BLOOD BY AUTOMATED COUNT: 13.7 % (ref 11.6–15.1)
GFR SERPL CREATININE-BSD FRML MDRD: 123 ML/MIN/1.73SQ M
GLUCOSE SERPL-MCNC: 121 MG/DL (ref 65–140)
HCT VFR BLD AUTO: 37.5 % (ref 34.8–46.1)
HGB BLD-MCNC: 12.9 G/DL (ref 11.5–15.4)
IMM GRANULOCYTES # BLD AUTO: 0.01 THOUSAND/UL (ref 0–0.2)
IMM GRANULOCYTES NFR BLD AUTO: 0 % (ref 0–2)
LYMPHOCYTES # BLD AUTO: 2.26 THOUSANDS/ÂΜL (ref 0.6–4.47)
LYMPHOCYTES NFR BLD AUTO: 40 % (ref 14–44)
MCH RBC QN AUTO: 32.2 PG (ref 26.8–34.3)
MCHC RBC AUTO-ENTMCNC: 34.4 G/DL (ref 31.4–37.4)
MCV RBC AUTO: 94 FL (ref 82–98)
MONOCYTES # BLD AUTO: 0.24 THOUSAND/ÂΜL (ref 0.17–1.22)
MONOCYTES NFR BLD AUTO: 4 % (ref 4–12)
NEUTROPHILS # BLD AUTO: 3.04 THOUSANDS/ÂΜL (ref 1.85–7.62)
NEUTS SEG NFR BLD AUTO: 55 % (ref 43–75)
NRBC BLD AUTO-RTO: 0 /100 WBCS
PLATELET # BLD AUTO: 249 THOUSANDS/UL (ref 149–390)
PMV BLD AUTO: 9.9 FL (ref 8.9–12.7)
POTASSIUM SERPL-SCNC: 3.8 MMOL/L (ref 3.5–5.3)
PROT SERPL-MCNC: 7 G/DL (ref 6.4–8.4)
RBC # BLD AUTO: 4.01 MILLION/UL (ref 3.81–5.12)
SODIUM SERPL-SCNC: 139 MMOL/L (ref 135–147)
TSH SERPL DL<=0.05 MIU/L-ACNC: 0.66 UIU/ML (ref 0.45–4.5)
WBC # BLD AUTO: 5.65 THOUSAND/UL (ref 4.31–10.16)

## 2023-02-14 RX ORDER — LANOLIN ALCOHOL/MO/W.PET/CERES
3 CREAM (GRAM) TOPICAL
Qty: 30 TABLET | Refills: 5 | Status: SHIPPED | OUTPATIENT
Start: 2023-02-14

## 2023-02-14 RX ORDER — SENNA AND DOCUSATE SODIUM 50; 8.6 MG/1; MG/1
1 TABLET, FILM COATED ORAL DAILY
Qty: 30 TABLET | Refills: 5 | Status: SHIPPED | OUTPATIENT
Start: 2023-02-14

## 2023-02-14 NOTE — PROGRESS NOTES
Patient reports to infusion center for lab specimen collection via L CW port today  Patient reports feeling well with no complaints, labs obtained via port with no difficulty  Patient tolerated procedure well with no adverse events  Patient aware of next appointment, AVS declined

## 2023-02-14 NOTE — PROGRESS NOTES
Outpatient Consultation - Palliative and [facility] 42 y o  female [phone]    Assessment & Plan  Problem List Items Addressed This Visit        Immune and Lymphatic    Carcinoma of right breast metastatic to axillary lymph node ([facility] ) - Primary       Other    Triple negative breast cancer ([facility] )   Other Visit Diagnoses     Nausea        Palliative care patient        Alopecia due to cytotoxic drug        Constipation        Relevant Medications    senna-docusate sodium (SENOKOT-S) 8 6-50 mg per tablet        · Carcinoma of right breast to axillary node- continue to follow with Oncology for management and treatment  · Nausea- ondansetron )DT 8 mg every 8 hours as needed  · Constipation-Senna-S 8 6-50 mg daily  · Palliative care patient- symptom managment  · Psychosocial support- supportive listening provided    Medications adjusted this encounter:  Requested Prescriptions     Signed Prescriptions Disp Refills   • senna-docusate sodium (SENOKOT-S) 8 6-50 mg per tablet 30 tablet 5     Sig: Take 1 tablet by mouth daily     No orders of the defined types were placed in this encounter  There are no discontinued medications  PPS: [facility] was seen today for symptoms and planning cares related to above illnesses  Above orders were sent electronically, or provided in hardcopy in clinic  I have reviewed the patient's controlled substance dispensing history in the Prescription Drug Monitoring Program in compliance with the [facility] regulations before prescribing any controlled substances  We appreciate the referral, and wish for her to continue to follow with us  If there are questions or concerns, please contact us through our clinic/answering service 24 hours a day, seven days a week      FLAVIO Wayne  [facility] Palliative and Supportive Care          Visit Information    Accompanied By: No one    Source of History: Self    History Limitations: None      Chief Complaint  Chief Complaint   Patient presents with   • Constipation   • Pain   • Goals     Oncology History   Malignant neoplasm of overlapping sites of right female breast ([facility] ) (Resolved)   12/29/2022 Initial Diagnosis     Malignant neoplasm of overlapping sites of right female breast [facility])      Carcinoma of right breast metastatic to axillary lymph node ([facility] )   12/22/2022 -  Cancer Staged     Staging form: Breast, AJCC 8th Edition  - Clinical stage from 12/22/2022: Stage IIIB (cT3, cN1, cM0, G2, ER-, KS-, HER2-) - Signed by Savannah Eli MD on 1/4/2023  Stage prefix: Initial diagnosis  Nuclear grade: G3  Mitotic count score: Score 1  Histologic grading system: 3 grade system  Menopausal status: Premenopausal         12/22/2022 Initial Diagnosis     Triple negative breast cancer ([facility] )      12/22/2022 Biopsy     Right breast ultrasound guided biopsy  A  2 o'clock, 6cm from nipple  Invasive mammary carcinoma of no special type (ductal NST/invasive ductal carcinoma)   Grade 2  ER 0  KS 0   HER2 0  Ki-67 50%     B  4 o'clock, 6cm from nipple  Invasive mammary carcinoma of no special type (ductal NST/invasive ductal carcinoma)   Grade 2   ER 0   KS 0   HER2 2+   FISH negative  Ki-67 40%      C  Right Axillary lymph node  Metastatic carcinoma consistent with mammary primary      12/29/2022 Initial Diagnosis     Carcinoma of right breast metastatic to axillary lymph node ([place] [address] )      12/30/2022 Genetic Testing     [facility]  A total of 36 genes were evaluated, including: FRANKO, BRCA1, BRCA2, CDH1, CHEK2, PALB2, PTEN, STK11, TP53  Negative result   No pathogenic sequence variants or deletions/duplications identified         1/13/2023 -  Chemotherapy     DOXOrubicin (ADRIAMYCIN), 60 mg/m2 = 113 mg, Intravenous, Once, 0 of 4 cycles  cyclophosphamide (CYTOXAN) IVPB, 600 mg/m2 = 1,134 mg, Intravenous, Once, 0 of 4 cycles  CARBOplatin (PARAPLATIN) IVPB (GOG AUC DOSING), 201 9 mg (83 1 % of original dose 243 mg), Intravenous, Once, 2 of 4 cycles  Dose modification:   (original dose 243 mg, Cycle 1, Reason: Other (Must fill in a comment)),   (original dose 243 mg, Cycle 1)  Administration: 201 9 mg (1/13/2023), 206 7 mg (1/20/2023), 175 2 mg (2/3/2023), 206 7 mg (1/27/2023)  PACLItaxel (TAXOL) chemo IVPB, 80 mg/m2 = 151 2 mg, Intravenous, Once, 2 of 4 cycles  Administration: 151 2 mg (1/13/2023), 151 2 mg (1/20/2023), 151 2 mg (2/3/2023), 151 2 mg (1/27/2023)  aprepitant (CINVANTI) in  mL IVPB, 130 mg, Intravenous, Once, 0 of 4 cycles  pembrolizumab (KEYTRUDA) IVPB, 200 mg, Intravenous, Once, 2 of 17 cycles  Administration: 200 mg (1/13/2023), 200 mg (2/3/2023)      Triple negative breast cancer ([facility] )   12/22/2022 Initial Diagnosis     Triple negative breast cancer ([facility] )      12/22/2022 Biopsy     Right breast ultrasound guided biopsy  A  2 o'clock, 6cm from nipple  Invasive mammary carcinoma of no special type (ductal NST/invasive ductal carcinoma)   Grade 2  ER 0  FL 0   HER2 0  Ki-67 50%     B  4 o'clock, 6cm from nipple  Invasive mammary carcinoma of no special type (ductal NST/invasive ductal carcinoma)   Grade 2   ER 0   FL 0   HER2 2+   FISH negative  Ki-67 40%      C  Right Axillary lymph node  Metastatic carcinoma consistent with mammary primary      12/30/2022 Genetic Testing     [facility]  A total of 36 genes were evaluated, including: FRANKO, BRCA1, BRCA2, CDH1, CHEK2, PALB2, PTEN, STK11, TP53  Negative result  No pathogenic sequence variants or deletions/duplications identified          History of Present Illness      Yuni Parada is a 42 y o  female who presents as a referral from Dr Jen Uriarte of Onoclogy for primary palliative diagnosis of carcinoma of right breast metastatic to axillary lymph node  Consultation is requested for: symptom management  Patient was seen for symptom management constipation and sensation scalp following Keytruda    Patient states she had burning scalp after scratching her head following second Keytruda treatment, burning for 5 days, then discomfort for 2 days, resolved  She states she is not sleeping well on the weekends following treatments on Friday, and is tired during the week  Denies pain, nausea, vomiting or diarrhea  Admits to some metallic taste and dry mouth recommend eating with plastic ware, eating pineapple prior to meal and sample lollipops for dry mouth  Discussed with Dr Serena Johnston, monitor for scalp sensations following Keytruda, may be neuropathy, treatment recommendations pending symptoms  Pertinent Palliative Care Domains    Physical Symptoms:ambulates    Psychological Symptoms:no anxiety    Social Aspects: support system: mother, father, boyfriend, close friends, 2 children ages 15 and 15 at home          Advance Directive and Living Will:    No  Power of :  No  POLST:  No    Historical Data  Past Medical History:   Diagnosis Date   • Asthma    • Hypertension      Past Surgical History:   Procedure Laterality Date   •  SECTION     • IR PORT PLACEMENT  2023   • MRI BREAST BIOPSY LEFT (ALL INCLUSIVE) Left 2023   • TUBAL LIGATION     • US GUIDANCE BREAST BIOPSY RIGHT EACH ADDITIONAL Right 2022   • US GUIDED BREAST BIOPSY RIGHT COMPLETE Right 2022   • US GUIDED BREAST LYMPH NODE BIOPSY RIGHT Right 2022     Social History     Socioeconomic History   • Marital status: Single     Spouse name: Not on file   • Number of children: Not on file   • Years of education: Not on file   • Highest education level: Not on file   Occupational History   • Not on file   Tobacco Use   • Smoking status: Every Day     Packs/day: 0 50     Years: 15 00     Pack years: 7 50     Types: Cigarettes   • Smokeless tobacco: Never   Vaping Use   • Vaping Use: Never used   Substance and Sexual Activity   • Alcohol use: Not Currently   • Drug use: Never   • Sexual activity: Yes     Partners: Male     Birth control/protection: Female Sterilization   Other Topics Concern   • Not on file   Social History Narrative   • Not on file     Social Determinants of Health     Financial Resource Strain: Not on file   Food Insecurity: Not on file   Transportation Needs: Not on file   Physical Activity: Not on file   Stress: Not on file   Social Connections: Not on file   Intimate Partner Violence: Not on file   Housing Stability: Not on file     Family History   Problem Relation Age of Onset   • Lung cancer Mother 61   • Ovarian cancer Mother    • No Known Problems Daughter    • No Known Problems Daughter    • Skin cancer Maternal Grandmother    • Ovarian cancer Maternal Grandmother    • Prostate cancer Maternal Grandfather    • Breast cancer Maternal Aunt    • Ovarian cancer Maternal Aunt    • Breast cancer Paternal Aunt      No Known Allergies  Current Outpatient Medications   Medication Sig Dispense Refill   • albuterol (PROVENTIL HFA,VENTOLIN HFA) 90 mcg/act inhaler inhale 2 puffs by mouth and INTO THE LUNGS every 6 hours if needed for wheezing 18 g 11   • amLODIPine (NORVASC) 5 mg tablet Take 2 tablets (10 mg total) by mouth daily 60 tablet 2   • fexofenadine (ALLEGRA) 180 MG tablet Take 1 tablet (180 mg total) by mouth daily 30 tablet 5   • lidocaine-prilocaine (EMLA) cream Apply topically as needed for mild pain 30 g 0   • senna-docusate sodium (SENOKOT-S) 8 6-50 mg per tablet Take 1 tablet by mouth daily 30 tablet 5   • Symbicort 160-4 5 MCG/ACT inhaler inhale 2 puffs by mouth twice a day Rinse mouth after use 10 2 g 5   • CRANIAL PROSTHESIS, RX, Apply to area of concern for alopecia as needed   1 each 0   • montelukast (SINGULAIR) 10 mg tablet take 1 tablet by mouth at bedtime (Patient not taking: Reported on 2/14/2023) 30 tablet 5   • ondansetron (ZOFRAN-ODT) 8 mg disintegrating tablet Take 1 tablet (8 mg total) by mouth every 8 (eight) hours as needed for nausea or vomiting (Patient not taking: Reported on 2/14/2023) 20 tablet 0     No current facility-administered medications for this visit  Review of Systems   Constitutional: Positive for malaise/fatigue  Cardiovascular: Negative for chest pain  Respiratory: Negative for shortness of breath  Musculoskeletal: Negative  Gastrointestinal: Negative for abdominal pain, constipation, diarrhea, dysphagia, nausea and vomiting  Vital Signs    /60 (BP Location: Left arm, Patient Position: Sitting, Cuff Size: Standard)   Pulse 100   Temp 97 5 °F (36 4 °C) (Temporal)   Resp 16   Ht 5' 4" (1 626 m)   Wt 86 1 kg (189 lb 14 4 oz)   LMP 01/16/2023 (Approximate)   SpO2 95%   BMI 32 60 kg/m²     Physical Exam and Objective Data  Physical Exam  Constitutional:       General: She is not in acute distress  Appearance: She is not ill-appearing  HENT:      Head: Normocephalic and atraumatic  Eyes:      Extraocular Movements: Extraocular movements intact  Cardiovascular:      Rate and Rhythm: Normal rate  Pulmonary:      Effort: Pulmonary effort is normal    Skin:     General: Skin is warm and dry  Neurological:      Mental Status: She is alert and oriented to person, place, and time  Psychiatric:         Mood and Affect: Mood normal          Behavior: Behavior normal          Thought Content: Thought content normal          Judgment: Judgment normal            Radiology and Laboratory:  CT chest abdomen pelvis w contrast  Status: Final result     PACS Images     Show images for CT chest abdomen pelvis w contrast  Study Result    Narrative & Impression   CT CHEST, ABDOMEN AND PELVIS WITH IV CONTRAST     INDICATION:   C50 811: Malignant neoplasm of overlapping sites of right female breast  C50 911: Malignant neoplasm of unspecified site of right female breast  C77 3: Secondary and unspecified malignant neoplasm of axilla and upper limb lymph nodes      COMPARISON:  None      TECHNIQUE: CT examination of the chest, abdomen and pelvis was performed   Axial, sagittal, and coronal 2D reformatted images were created from the source data and submitted for interpretation      Radiation dose length product (DLP) for this visit:  [id number] mGy-cm   This examination, like all CT scans performed in the [facility], was performed utilizing techniques to minimize radiation dose exposure, including the use of iterative   reconstruction and automated exposure control      IV Contrast:  100 mL of iohexol (OMNIPAQUE)  Enteric Contrast: Enteric contrast was administered      FINDINGS:     CHEST     LUNGS:  There is minimal biapical paraseptal emphysema  There are tiny lung nodules including a left upper lobe nodule on series 3 image 22 left lower lobe nodules on image 80, right lower lobe nodule on image 69, and a right upper lobe nodule on image   47  There is no tracheal or endobronchial lesion      PLEURA:  Unremarkable      HEART/GREAT VESSELS: Heart is unremarkable for patient's age  No thoracic aortic aneurysm      MEDIASTINUM AND TIFFANI:  Unremarkable      CHEST WALL AND LOWER NECK:  Unremarkable      ABDOMEN     LIVER/BILIARY TREE:  Unremarkable      GALLBLADDER:  No calcified gallstones  No pericholecystic inflammatory change      SPLEEN:  Unremarkable      PANCREAS:  Unremarkable      ADRENAL GLANDS:  Unremarkable      KIDNEYS/URETERS:  Unremarkable  No hydronephrosis      STOMACH AND BOWEL:  Unremarkable      APPENDIX:  No findings to suggest appendicitis      ABDOMINOPELVIC CAVITY:  No ascites  No pneumoperitoneum  No lymphadenopathy      VESSELS:  There is atherosclerotic calcification of the distal abdominal aorta and proximal iliac vessels      PELVIS     REPRODUCTIVE ORGANS:  Unremarkable for patient's age  There are left-sided cysts/follicles, physiologic      URINARY BLADDER:  Unremarkable      ABDOMINAL WALL/INGUINAL REGIONS:  Unremarkable      OSSEOUS STRUCTURES:  No acute fracture or destructive osseous lesion      IMPRESSION:     1   Multiple small lung nodules  These may be inflammatory though a three-month follow-up is recommended to completely exclude metastasis      2  Unremarkable abdomen and pelvis without evidence of metastatic disease      3  Minimal paraseptal emphysema at the apices      4  Aortic atherosclerosis      The study was marked in EPIC for significant notification      Workstation performed: [id number]     NM bone scan whole body  Status: Final result     PACS Images     Show images for NM bone scan whole body  Study Result    Result Text   BONE SCAN  WHOLE BODY     INDICATION: C50 811: Malignant neoplasm of overlapping sites of right female breast  C50 911: Malignant neoplasm of unspecified site of right female breast  C77 3: Secondary and unspecified malignant neoplasm of axilla and upper limb lymph nodes     PREVIOUS FILM CORRELATION:    CT chest abdomen pelvis 12/29/2022     TECHNIQUE:   This study was performed following the intravenous administration of 23 6 mCi Tc-99m labeled MDP  Delayed, anterior and posterior whole body images were acquired, 2-3 hours after radiopharmaceutical administration  Additional delayed   oblique static images acquired of the bilateral ribs and pelvis      FINDINGS:     Small focus of radiotracer uptake at the right mandible may be related to dental disease      Small focus of radiotracer uptake at the left wrist typical location for degenerative changes      Otherwise normal distribution of radiotracer      There is no scintigraphic evidence of osseous metastasis        Fairly symmetric physiologic renal activity         IMPRESSION:     1  No scintigraphic evidence of osseous metastasis           Workstation performed: [id number]     MRI breast biopsy left (all inclusive)  Status: Edited Result - FINAL     PACS Images     Show images for MRI breast biopsy left (all inclusive)  Addendum    ADDENDUM:     PATHOLOGY RESULTS:   D) Non-mass Enhancement (Left; 3 o'clock;  Middle; 2 5 cm from nipple)  Benign finding: the pathology findings indicate benign breast tissue and fibrocystic change       This is concordant with imaging      Surgical consultation for biopsy confirmed malignancy in the right breast recommended      RECOMMENDATION:       - Return to Routine Screening for the left breast        - Surgical Consultation for the right breast      END ADDENDUM   Addended by Fuad Lane MD on 1/25/2023  1:41 PM     Study Result    Narrative & Impression   DIAGNOSIS: Triple negative breast cancer ([address] )     TECHNIQUE:  MRI of the breast was performed in axial and sagittal planes utilizing a combination of sagittal dynamic T1 pre- and post contrast imaging with fat suppression in addition to axial post contrast fat saturated T1     Intravenous contrast was administered at 2cc/sec, without documented contrast reaction  Prior to the procedure, previous imaging was reviewed  The procedure was explained to the patient in detail  All questions were answered  Written and verbal informed consent was obtained      MEDICATIONS ADMINISTERED:         INDICATION: Alvaro Pierce is a 42 y o  female presenting for clumped non-mass enhancement 3:00 left breast      FINDINGS:   LEFT  D) NON-MASS ENHANCEMENT: Images of the left breast non-mass enhancement at 3 o'clock in the middle portion, 2 5 cm from the nipple were obtained  The patient was placed prone on the biopsy table and a timeout was performed  A core needle biopsy was performed under MRI guidance using a lateral approach  The skin was prepped in the usual fashion  Anesthesia was administered using 5 mL of lidocaine 1% and 10 mL of lidocaine 1% with epinephrine  A 12 G device was advanced  A clip was inserted into the target area  A stoplight biopsy clip marker is placed in the target area  Post-placement MR imaging demonstrates the clip was at the site  The patient tolerated the procedure well   There were no immediate complications         IMPRESSION:   MRI guided biopsy for clumped non-mass enhancement 3:00 left breast      RECOMMENDATION:       - Waiting for pathology for the left breast       Workstation ID: [id number]       Tissue Exam: [id number]  Order: [id number]   Collected 1/20/2023 10:17      Status: Final result      Visible to patient: Yes (seen)      Dx: Triple negative breast cancer [facility])      0 Result Notes  Component    Case Report   Surgical Pathology Report                         Case: [id number]                                    Authorizing Provider: Taniya Mendiola,  Collected:           01/20/2023 1017                                      MD                                                                            Ordering Location:     [facility]      Received:            01/20/2023 [address] [place] [address] MRI                                                                  Pathologist:           Britany Flynn MD                                                                 Specimen:    Breast, Left, 3 O CLOCK 12 PASSES                                                          Final Diagnosis   A  Left breast, 3:00 (MRI-guided core biopsy, 12 passes):     - Benign breast tissue with fibrocystic change  - Negative for atypia and malignancy in sampled material      Comment:  Clinical / radiologic correlation is recommended to ensure adequate sampling of the targeted lesion     Electronically signed by Britany Flynn MD on 1/23/2023 at 8715   Additional Information    All reported additional testing was performed with appropriately reactive controls   These tests were developed and their performance characteristics determined by [facility] or appropriate performing facility, though some tests may be performed on tissues which have not been validated for performance characteristics (such as staining performed on alcohol exposed cell blocks and decalcified tissues)   Results should be interpreted with caution and in the context of the patients’ clinical condition  These tests may not be cleared or approved by the [place]  Food and Drug Administration, though the FDA has determined that such clearance or approval is not necessary  These tests are used for clinical purposes and they should not be regarded as investigational or for research  This laboratory has been approved by CLIA 88, designated as a high-complexity laboratory and is qualified to perform these tests  Interpretation performed at [facility], [address] [place] [address]  Akanksha Bofeliciano Description    A  The specimen is received in formalin, labeled with the patient's name and hospital number, and is designated " left breast 3:00, 12 passes"  The specimen consists of 14 tan-yellow fibromembranous and fibrofatty, friable tissue cores measuring less than 0 1-0 4 cm in diameter and ranging from 1 5-3 4 cm in length  The specimen is entirely submitted between sponges, 7 cassettes      Note: The estimated total formalin fixation time based upon information provided by the submitting clinician and the standard processing schedule is 13 hours  Jose Segovia   Resulting Agency [facility]              Specimen Collected: 01/20/23 10:17 Last Resulted: 01/23/23 08:21               60 minutes was spent face to face with Naye Brooks, reviewing chart and discussion with Dr Francisco J Narayan, with greater than 50% of the time spent in counseling or coordination of care including discussions of introduction to Palliative and Supportive Care and symptom management  All of the patient's questions were answered during this discussion

## 2023-02-16 NOTE — PROGRESS NOTES
Plan sent to Dr Hiren Mcintyre in regards to clarification of Carbo dose  Patient had labs completed on 2/14/23      Creatinine 0 60 - 1 30 mg/dL 0 49 Low

## 2023-02-16 NOTE — PROGRESS NOTES
Reviewed with Dr Efra Nascimento for cycle 2 day 15 patient carboplatin AUC= 1 5 (206 7mg) to be administered over 30 minutes

## 2023-02-17 ENCOUNTER — HOSPITAL ENCOUNTER (OUTPATIENT)
Dept: INFUSION CENTER | Facility: CLINIC | Age: 38
End: 2023-02-17

## 2023-02-17 VITALS
OXYGEN SATURATION: 96 % | DIASTOLIC BLOOD PRESSURE: 80 MMHG | SYSTOLIC BLOOD PRESSURE: 149 MMHG | BODY MASS INDEX: 31.72 KG/M2 | HEIGHT: 64 IN | WEIGHT: 185.8 LBS | HEART RATE: 103 BPM | TEMPERATURE: 97.8 F | RESPIRATION RATE: 18 BRPM

## 2023-02-17 DIAGNOSIS — C77.3 CARCINOMA OF RIGHT BREAST METASTATIC TO AXILLARY LYMPH NODE (HCC): Primary | ICD-10-CM

## 2023-02-17 DIAGNOSIS — C50.911 CARCINOMA OF RIGHT BREAST METASTATIC TO AXILLARY LYMPH NODE (HCC): Primary | ICD-10-CM

## 2023-02-17 RX ORDER — SODIUM CHLORIDE 9 MG/ML
20 INJECTION, SOLUTION INTRAVENOUS ONCE
Status: COMPLETED | OUTPATIENT
Start: 2023-02-17 | End: 2023-02-17

## 2023-02-17 RX ADMIN — DIPHENHYDRAMINE HYDROCHLORIDE 25 MG: 50 INJECTION, SOLUTION INTRAMUSCULAR; INTRAVENOUS at 08:35

## 2023-02-17 RX ADMIN — CARBOPLATIN 206.7 MG: 10 INJECTION, SOLUTION INTRAVENOUS at 13:06

## 2023-02-17 RX ADMIN — DEXAMETHASONE SODIUM PHOSPHATE: 10 INJECTION, SOLUTION INTRAMUSCULAR; INTRAVENOUS at 08:09

## 2023-02-17 RX ADMIN — FAMOTIDINE 20 MG: 10 INJECTION, SOLUTION INTRAVENOUS at 08:56

## 2023-02-17 RX ADMIN — ALTEPLASE 2 MG: 2.2 INJECTION, POWDER, LYOPHILIZED, FOR SOLUTION INTRAVENOUS at 09:35

## 2023-02-17 RX ADMIN — SODIUM CHLORIDE 20 ML/HR: 0.9 INJECTION, SOLUTION INTRAVENOUS at 08:09

## 2023-02-17 RX ADMIN — PACLITAXEL 151.2 MG: 6 INJECTION, SOLUTION INTRAVENOUS at 11:03

## 2023-02-17 NOTE — PROGRESS NOTES
Pt presents for Taxol and Carboplatin offering no current complaints  Pt tolerated treatment without incident  AVS declined  Next appointment reviewed

## 2023-02-21 ENCOUNTER — TELEPHONE (OUTPATIENT)
Dept: HEMATOLOGY ONCOLOGY | Facility: CLINIC | Age: 38
End: 2023-02-21

## 2023-02-21 ENCOUNTER — APPOINTMENT (OUTPATIENT)
Dept: LAB | Facility: MEDICAL CENTER | Age: 38
End: 2023-02-21

## 2023-02-21 ENCOUNTER — HOSPITAL ENCOUNTER (OUTPATIENT)
Dept: INFUSION CENTER | Facility: CLINIC | Age: 38
Discharge: HOME | End: 2023-02-21

## 2023-02-21 DIAGNOSIS — C77.3 CARCINOMA OF RIGHT BREAST METASTATIC TO AXILLARY LYMPH NODE (HCC): ICD-10-CM

## 2023-02-21 DIAGNOSIS — C50.911 CARCINOMA OF RIGHT BREAST METASTATIC TO AXILLARY LYMPH NODE (HCC): ICD-10-CM

## 2023-02-21 LAB
ALBUMIN SERPL BCP-MCNC: 3.8 G/DL (ref 3.5–5)
ALP SERPL-CCNC: 81 U/L (ref 46–116)
ALT SERPL W P-5'-P-CCNC: 40 U/L (ref 12–78)
ANION GAP SERPL CALCULATED.3IONS-SCNC: 5 MMOL/L (ref 4–13)
AST SERPL W P-5'-P-CCNC: 21 U/L (ref 5–45)
BASOPHILS # BLD AUTO: 0.04 THOUSANDS/ÂΜL (ref 0–0.1)
BASOPHILS NFR BLD AUTO: 1 % (ref 0–1)
BILIRUB SERPL-MCNC: 1.42 MG/DL (ref 0.2–1)
BUN SERPL-MCNC: 15 MG/DL (ref 5–25)
CALCIUM SERPL-MCNC: 9.6 MG/DL (ref 8.3–10.1)
CHLORIDE SERPL-SCNC: 106 MMOL/L (ref 96–108)
CO2 SERPL-SCNC: 26 MMOL/L (ref 21–32)
CREAT SERPL-MCNC: 0.64 MG/DL (ref 0.6–1.3)
EOSINOPHIL # BLD AUTO: 0.09 THOUSAND/ÂΜL (ref 0–0.61)
EOSINOPHIL NFR BLD AUTO: 2 % (ref 0–6)
ERYTHROCYTE [DISTWIDTH] IN BLOOD BY AUTOMATED COUNT: 14.2 % (ref 11.6–15.1)
GFR SERPL CREATININE-BSD FRML MDRD: 112 ML/MIN/1.73SQ M
GLUCOSE P FAST SERPL-MCNC: 105 MG/DL (ref 65–99)
HCT VFR BLD AUTO: 37.1 % (ref 34.8–46.1)
HGB BLD-MCNC: 12.6 G/DL (ref 11.5–15.4)
IMM GRANULOCYTES # BLD AUTO: 0.02 THOUSAND/UL (ref 0–0.2)
IMM GRANULOCYTES NFR BLD AUTO: 0 % (ref 0–2)
LYMPHOCYTES # BLD AUTO: 1.68 THOUSANDS/ÂΜL (ref 0.6–4.47)
LYMPHOCYTES NFR BLD AUTO: 35 % (ref 14–44)
MCH RBC QN AUTO: 32.2 PG (ref 26.8–34.3)
MCHC RBC AUTO-ENTMCNC: 34 G/DL (ref 31.4–37.4)
MCV RBC AUTO: 95 FL (ref 82–98)
MONOCYTES # BLD AUTO: 0.19 THOUSAND/ÂΜL (ref 0.17–1.22)
MONOCYTES NFR BLD AUTO: 4 % (ref 4–12)
NEUTROPHILS # BLD AUTO: 2.74 THOUSANDS/ÂΜL (ref 1.85–7.62)
NEUTS SEG NFR BLD AUTO: 58 % (ref 43–75)
NRBC BLD AUTO-RTO: 0 /100 WBCS
PLATELET # BLD AUTO: 214 THOUSANDS/UL (ref 149–390)
PMV BLD AUTO: 10.1 FL (ref 8.9–12.7)
POTASSIUM SERPL-SCNC: 4.2 MMOL/L (ref 3.5–5.3)
PROT SERPL-MCNC: 7.4 G/DL (ref 6.4–8.4)
RBC # BLD AUTO: 3.91 MILLION/UL (ref 3.81–5.12)
SODIUM SERPL-SCNC: 137 MMOL/L (ref 135–147)
WBC # BLD AUTO: 4.76 THOUSAND/UL (ref 4.31–10.16)

## 2023-02-21 NOTE — TELEPHONE ENCOUNTER
"Hi, this is [place]  My birthday is [birth year]  I had a blood draw [facility] 12:30 [facility], but I already had the blood drawn because I'm up in [facility] going to work with my father, so they were aware of it  I didn't know if it was a guarantee, but the blood was already drawn this morning, so I don't need to make the space for the appointment at 12:30  You can return my phone call at [phone]  Thank you "    Message out to [place] infusion center in regards to above  Labs pending as of 2/21/23 at 1105am  Call out to patient and reviewed vm was received

## 2023-02-23 ENCOUNTER — OFFICE VISIT (OUTPATIENT)
Dept: HEMATOLOGY ONCOLOGY | Facility: CLINIC | Age: 38
End: 2023-02-23

## 2023-02-23 VITALS
DIASTOLIC BLOOD PRESSURE: 84 MMHG | BODY MASS INDEX: 31.58 KG/M2 | SYSTOLIC BLOOD PRESSURE: 142 MMHG | WEIGHT: 185 LBS | RESPIRATION RATE: 18 BRPM | TEMPERATURE: 98.7 F | HEIGHT: 64 IN | HEART RATE: 99 BPM | OXYGEN SATURATION: 95 %

## 2023-02-23 DIAGNOSIS — C77.3 CARCINOMA OF RIGHT BREAST METASTATIC TO AXILLARY LYMPH NODE (HCC): Primary | ICD-10-CM

## 2023-02-23 DIAGNOSIS — C50.911 CARCINOMA OF RIGHT BREAST METASTATIC TO AXILLARY LYMPH NODE (HCC): Primary | ICD-10-CM

## 2023-02-24 ENCOUNTER — PATIENT OUTREACH (OUTPATIENT)
Dept: CASE MANAGEMENT | Facility: HOSPITAL | Age: 38
End: 2023-02-24

## 2023-02-24 ENCOUNTER — HOSPITAL ENCOUNTER (OUTPATIENT)
Dept: INFUSION CENTER | Facility: CLINIC | Age: 38
End: 2023-02-24

## 2023-02-24 VITALS
HEIGHT: 64 IN | TEMPERATURE: 98.2 F | BODY MASS INDEX: 31.62 KG/M2 | RESPIRATION RATE: 18 BRPM | HEART RATE: 106 BPM | WEIGHT: 185.2 LBS | DIASTOLIC BLOOD PRESSURE: 87 MMHG | SYSTOLIC BLOOD PRESSURE: 135 MMHG

## 2023-02-24 DIAGNOSIS — C77.3 CARCINOMA OF RIGHT BREAST METASTATIC TO AXILLARY LYMPH NODE (HCC): Primary | ICD-10-CM

## 2023-02-24 DIAGNOSIS — C50.911 CARCINOMA OF RIGHT BREAST METASTATIC TO AXILLARY LYMPH NODE (HCC): Primary | ICD-10-CM

## 2023-02-24 LAB — TSH SERPL DL<=0.05 MIU/L-ACNC: 0.61 UIU/ML (ref 0.45–4.5)

## 2023-02-24 RX ORDER — SODIUM CHLORIDE 9 MG/ML
20 INJECTION, SOLUTION INTRAVENOUS ONCE
Status: COMPLETED | OUTPATIENT
Start: 2023-02-24 | End: 2023-02-24

## 2023-02-24 RX ADMIN — SODIUM CHLORIDE 20 ML/HR: 0.9 INJECTION, SOLUTION INTRAVENOUS at 08:30

## 2023-02-24 RX ADMIN — DIPHENHYDRAMINE HYDROCHLORIDE 25 MG: 50 INJECTION, SOLUTION INTRAMUSCULAR; INTRAVENOUS at 08:56

## 2023-02-24 RX ADMIN — DEXAMETHASONE SODIUM PHOSPHATE: 10 INJECTION, SOLUTION INTRAMUSCULAR; INTRAVENOUS at 08:32

## 2023-02-24 RX ADMIN — SODIUM CHLORIDE 200 MG: 9 INJECTION, SOLUTION INTRAVENOUS at 09:57

## 2023-02-24 RX ADMIN — PACLITAXEL 151.2 MG: 6 INJECTION, SOLUTION, CONCENTRATE INTRAVENOUS at 10:49

## 2023-02-24 RX ADMIN — FAMOTIDINE 20 MG: 10 INJECTION, SOLUTION INTRAVENOUS at 09:19

## 2023-02-24 RX ADMIN — CARBOPLATIN 206.7 MG: 10 INJECTION, SOLUTION INTRAVENOUS at 13:00

## 2023-02-24 NOTE — PROGRESS NOTES
Ok to treat as scheduled on 2/24/23  Please obtain TSH, 3rd generation with Free T4 reflex prior to treatment and proceed without results

## 2023-02-24 NOTE — PROGRESS NOTES
Pt reports to unit receiving Keytruda, Taxol and Carboplatin, TSH lab obtained when port accessed  Patient tolerated infusions well without any adverse events, positive blood return throughout  Patient aware of next appointment, Port deaccessed, AVS declined

## 2023-02-24 NOTE — PROGRESS NOTES
Pt asked her Infusion RN today about resources for wigs, MSW provided her information on 3 local salons  She says that she is almost sure that she has a RX already for a wig  We talked about a few different local options as well as the wig salon at the [facility], however I let her know that if she wanted something specific or something ordered to look like her hair now, she should use the RX and a wig supplier  I also suggested that she take it to her hairdresser and have it shaped to her face  She thanked me for the information and agreed to let me know if she needed more assistance  MSW will remain available as needed, no other needs at this time

## 2023-03-01 ENCOUNTER — TELEPHONE (OUTPATIENT)
Dept: PALLIATIVE MEDICINE | Facility: CLINIC | Age: 38
End: 2023-03-01

## 2023-03-01 ENCOUNTER — HOSPITAL ENCOUNTER (OUTPATIENT)
Dept: INFUSION CENTER | Facility: CLINIC | Age: 38
Discharge: HOME | End: 2023-03-01

## 2023-03-01 DIAGNOSIS — F41.9 ANXIETY: Primary | ICD-10-CM

## 2023-03-01 DIAGNOSIS — Z95.828 PORT-A-CATH IN PLACE: ICD-10-CM

## 2023-03-01 DIAGNOSIS — F11.90 OPIOID USE: ICD-10-CM

## 2023-03-01 DIAGNOSIS — C77.3 CARCINOMA OF RIGHT BREAST METASTATIC TO AXILLARY LYMPH NODE (HCC): Primary | ICD-10-CM

## 2023-03-01 DIAGNOSIS — C50.911 CARCINOMA OF RIGHT BREAST METASTATIC TO AXILLARY LYMPH NODE (HCC): Primary | ICD-10-CM

## 2023-03-01 DIAGNOSIS — G89.3 CANCER RELATED PAIN: ICD-10-CM

## 2023-03-01 LAB
ALBUMIN SERPL BCP-MCNC: 4.1 G/DL (ref 3.5–5)
ALP SERPL-CCNC: 59 U/L (ref 34–104)
ALT SERPL W P-5'-P-CCNC: 23 U/L (ref 7–52)
ANION GAP SERPL CALCULATED.3IONS-SCNC: 7 MMOL/L (ref 4–13)
AST SERPL W P-5'-P-CCNC: 15 U/L (ref 13–39)
BASOPHILS # BLD AUTO: 0.06 THOUSANDS/ÂΜL (ref 0–0.1)
BASOPHILS NFR BLD AUTO: 1 % (ref 0–1)
BILIRUB SERPL-MCNC: 0.25 MG/DL (ref 0.2–1)
BUN SERPL-MCNC: 14 MG/DL (ref 5–25)
CALCIUM SERPL-MCNC: 9.5 MG/DL (ref 8.4–10.2)
CHLORIDE SERPL-SCNC: 104 MMOL/L (ref 96–108)
CO2 SERPL-SCNC: 26 MMOL/L (ref 21–32)
CREAT SERPL-MCNC: 0.67 MG/DL (ref 0.6–1.3)
EOSINOPHIL # BLD AUTO: 0.06 THOUSAND/ÂΜL (ref 0–0.61)
EOSINOPHIL NFR BLD AUTO: 1 % (ref 0–6)
ERYTHROCYTE [DISTWIDTH] IN BLOOD BY AUTOMATED COUNT: 14.9 % (ref 11.6–15.1)
GFR SERPL CREATININE-BSD FRML MDRD: 111 ML/MIN/1.73SQ M
GLUCOSE SERPL-MCNC: 143 MG/DL (ref 65–140)
HCT VFR BLD AUTO: 34.8 % (ref 34.8–46.1)
HGB BLD-MCNC: 12 G/DL (ref 11.5–15.4)
IMM GRANULOCYTES # BLD AUTO: 0.02 THOUSAND/UL (ref 0–0.2)
IMM GRANULOCYTES NFR BLD AUTO: 0 % (ref 0–2)
LYMPHOCYTES # BLD AUTO: 2.63 THOUSANDS/ÂΜL (ref 0.6–4.47)
LYMPHOCYTES NFR BLD AUTO: 46 % (ref 14–44)
MCH RBC QN AUTO: 32.3 PG (ref 26.8–34.3)
MCHC RBC AUTO-ENTMCNC: 34.5 G/DL (ref 31.4–37.4)
MCV RBC AUTO: 94 FL (ref 82–98)
MONOCYTES # BLD AUTO: 0.24 THOUSAND/ÂΜL (ref 0.17–1.22)
MONOCYTES NFR BLD AUTO: 4 % (ref 4–12)
NEUTROPHILS # BLD AUTO: 2.7 THOUSANDS/ÂΜL (ref 1.85–7.62)
NEUTS SEG NFR BLD AUTO: 48 % (ref 43–75)
NRBC BLD AUTO-RTO: 0 /100 WBCS
PLATELET # BLD AUTO: 278 THOUSANDS/UL (ref 149–390)
PMV BLD AUTO: 9.9 FL (ref 8.9–12.7)
POTASSIUM SERPL-SCNC: 3.9 MMOL/L (ref 3.5–5.3)
PROT SERPL-MCNC: 6.8 G/DL (ref 6.4–8.4)
RBC # BLD AUTO: 3.71 MILLION/UL (ref 3.81–5.12)
SODIUM SERPL-SCNC: 137 MMOL/L (ref 135–147)
WBC # BLD AUTO: 5.71 THOUSAND/UL (ref 4.31–10.16)

## 2023-03-01 RX ORDER — NALOXONE HYDROCHLORIDE 4 MG/.1ML
SPRAY NASAL
Qty: 1 EACH | Refills: 1 | Status: SHIPPED | OUTPATIENT
Start: 2023-03-01

## 2023-03-01 RX ORDER — OXYCODONE HYDROCHLORIDE 5 MG/1
5 TABLET ORAL EVERY 6 HOURS PRN
Qty: 30 TABLET | Refills: 0 | Status: SHIPPED | OUTPATIENT
Start: 2023-03-01 | End: 2023-03-17 | Stop reason: SDUPTHER

## 2023-03-01 RX ORDER — LORAZEPAM 0.5 MG/1
0.5 TABLET ORAL
Qty: 14 TABLET | Refills: 0 | Status: SHIPPED | OUTPATIENT
Start: 2023-03-01 | End: 2023-03-08

## 2023-03-01 NOTE — TELEPHONE ENCOUNTER
Patient called office c/o anxiety, patient stated since started treatment and taking steroids patient has not been feeling well, not being able to sleep for about three nights  Melatonin is not working for patient, Patient is requesting  call back to address this symptoms  Please advise

## 2023-03-01 NOTE — TELEPHONE ENCOUNTER
Call placed to [facility] pre-chemo medication includes steroid, she has been more anxious and not sleeping, also experiencing increased pain  We will start Ativan 0 5 mg at bedtime as needed  Oxycodone 5 mg every 6 hours as needed for pain  Instructed to drive while taking medication, take medication during a time when she is able to be home and see how she is tolerating as she does pick her son up from school  Instructed patient to contact Palliative anytime with questions or concerns, and if pain or symptoms worsen or do not improve  Narcan prescription also sent to pharmacy

## 2023-03-01 NOTE — PROGRESS NOTES
Patient reports to infusion center for lab specimen collection via R CW port today  Patient reports feeling muscle spasms and exhaustion with hair loss and trouble sleeping, states she is going to call office today as well as palliative care, labs obtained via port with no difficulty  Patient tolerated procedure well with no adverse events  Patient aware of next appointment, AVS declined

## 2023-03-03 ENCOUNTER — HOSPITAL ENCOUNTER (OUTPATIENT)
Dept: INFUSION CENTER | Facility: CLINIC | Age: 38
End: 2023-03-03

## 2023-03-03 VITALS
TEMPERATURE: 98.2 F | BODY MASS INDEX: 31.76 KG/M2 | SYSTOLIC BLOOD PRESSURE: 131 MMHG | WEIGHT: 186 LBS | HEIGHT: 64 IN | DIASTOLIC BLOOD PRESSURE: 76 MMHG | RESPIRATION RATE: 20 BRPM | HEART RATE: 107 BPM

## 2023-03-03 DIAGNOSIS — C77.3 CARCINOMA OF RIGHT BREAST METASTATIC TO AXILLARY LYMPH NODE (HCC): Primary | ICD-10-CM

## 2023-03-03 DIAGNOSIS — C50.911 CARCINOMA OF RIGHT BREAST METASTATIC TO AXILLARY LYMPH NODE (HCC): Primary | ICD-10-CM

## 2023-03-03 RX ORDER — SODIUM CHLORIDE 9 MG/ML
20 INJECTION, SOLUTION INTRAVENOUS ONCE
Status: COMPLETED | OUTPATIENT
Start: 2023-03-03 | End: 2023-03-03

## 2023-03-03 RX ADMIN — PACLITAXEL 151.2 MG: 6 INJECTION, SOLUTION, CONCENTRATE INTRAVENOUS at 09:29

## 2023-03-03 RX ADMIN — DIPHENHYDRAMINE HYDROCHLORIDE 25 MG: 50 INJECTION, SOLUTION INTRAMUSCULAR; INTRAVENOUS at 08:26

## 2023-03-03 RX ADMIN — SODIUM CHLORIDE 20 ML/HR: 0.9 INJECTION, SOLUTION INTRAVENOUS at 07:54

## 2023-03-03 RX ADMIN — CARBOPLATIN 206.7 MG: 10 INJECTION, SOLUTION INTRAVENOUS at 12:13

## 2023-03-03 RX ADMIN — FAMOTIDINE 20 MG: 10 INJECTION, SOLUTION INTRAVENOUS at 08:49

## 2023-03-03 RX ADMIN — DEXAMETHASONE SODIUM PHOSPHATE: 10 INJECTION, SOLUTION INTRAMUSCULAR; INTRAVENOUS at 08:04

## 2023-03-03 NOTE — PROGRESS NOTES
Received notification from infusion RN that post paclitaxel treatment, patient stated she had a dry, itchy throat and was irritating  By the time RN was talking with infusion RN patient stated that the feeling subsided  Reviewed that patient declined any sob, chest tightness, skin changes and no difficulty swallowing at this time  Reviewed to monitor throughout rest of treatment, per Dr Pascale Buenrostro no need for hypersensitivity protocol at this time  Infusion RN notified of recommendation and to encourage patient to reach out to office, Barrie Pérez and ED if s/s reappear and or worsen

## 2023-03-03 NOTE — PROGRESS NOTES
Pt to clinic for taxol and carboplatin, c/o feeling sore/achy and run down but otherwise okay  Following taxol, pt stated she had developed a cough and her throat felt "itchy and burning" but improving  Spoke with Bernabe Michelle RN who stated per Dr Marvin Andino and Monico DAVILA pt okay to continue with carboplatin since situation improving and pt denying SOB and difficulty swallowing  Army Ivan also stated MD office is to be notified if throat issues return before end of treatment today and pt is to go to ER if symptoms return when she is home  Pt verbalized understanding and stated throat was back to baseline prior to carboplatin administration  Pt tolerated carboplatin without complications, aware of next appointment, port de-accessed, avs declined

## 2023-03-07 ENCOUNTER — HOSPITAL ENCOUNTER (OUTPATIENT)
Dept: INFUSION CENTER | Facility: CLINIC | Age: 38
Discharge: HOME | End: 2023-03-07

## 2023-03-07 DIAGNOSIS — C50.911 CARCINOMA OF RIGHT BREAST METASTATIC TO AXILLARY LYMPH NODE (HCC): Primary | ICD-10-CM

## 2023-03-07 DIAGNOSIS — Z95.828 PORT-A-CATH IN PLACE: ICD-10-CM

## 2023-03-07 DIAGNOSIS — C77.3 CARCINOMA OF RIGHT BREAST METASTATIC TO AXILLARY LYMPH NODE (HCC): Primary | ICD-10-CM

## 2023-03-07 LAB
ALBUMIN SERPL BCP-MCNC: 4.3 G/DL (ref 3.5–5)
ALP SERPL-CCNC: 64 U/L (ref 34–104)
ALT SERPL W P-5'-P-CCNC: 25 U/L (ref 7–52)
ANION GAP SERPL CALCULATED.3IONS-SCNC: 7 MMOL/L (ref 4–13)
ANISOCYTOSIS BLD QL SMEAR: PRESENT
AST SERPL W P-5'-P-CCNC: 17 U/L (ref 13–39)
BASOPHILS # BLD MANUAL: 0 THOUSAND/UL (ref 0–0.1)
BASOPHILS NFR MAR MANUAL: 0 % (ref 0–1)
BILIRUB SERPL-MCNC: 0.69 MG/DL (ref 0.2–1)
BUN SERPL-MCNC: 10 MG/DL (ref 5–25)
CALCIUM SERPL-MCNC: 9.3 MG/DL (ref 8.4–10.2)
CHLORIDE SERPL-SCNC: 104 MMOL/L (ref 96–108)
CO2 SERPL-SCNC: 24 MMOL/L (ref 21–32)
CREAT SERPL-MCNC: 0.6 MG/DL (ref 0.6–1.3)
EOSINOPHIL # BLD MANUAL: 0 THOUSAND/UL (ref 0–0.4)
EOSINOPHIL NFR BLD MANUAL: 0 % (ref 0–6)
ERYTHROCYTE [DISTWIDTH] IN BLOOD BY AUTOMATED COUNT: 15.1 % (ref 11.6–15.1)
GFR SERPL CREATININE-BSD FRML MDRD: 115 ML/MIN/1.73SQ M
GLUCOSE SERPL-MCNC: 121 MG/DL (ref 65–140)
HCT VFR BLD AUTO: 36.8 % (ref 34.8–46.1)
HGB BLD-MCNC: 12.6 G/DL (ref 11.5–15.4)
LYMPHOCYTES # BLD AUTO: 1.94 THOUSAND/UL (ref 0.6–4.47)
LYMPHOCYTES # BLD AUTO: 37 % (ref 14–44)
MCH RBC QN AUTO: 32.8 PG (ref 26.8–34.3)
MCHC RBC AUTO-ENTMCNC: 34.2 G/DL (ref 31.4–37.4)
MCV RBC AUTO: 96 FL (ref 82–98)
MONOCYTES # BLD AUTO: 0.1 THOUSAND/UL (ref 0–1.22)
MONOCYTES NFR BLD: 2 % (ref 4–12)
NEUTROPHILS # BLD MANUAL: 3.14 THOUSAND/UL (ref 1.85–7.62)
NEUTS SEG NFR BLD AUTO: 60 % (ref 43–75)
PLATELET # BLD AUTO: 238 THOUSANDS/UL (ref 149–390)
PLATELET BLD QL SMEAR: ADEQUATE
PMV BLD AUTO: 9.3 FL (ref 8.9–12.7)
POTASSIUM SERPL-SCNC: 4.1 MMOL/L (ref 3.5–5.3)
PROT SERPL-MCNC: 6.9 G/DL (ref 6.4–8.4)
RBC # BLD AUTO: 3.84 MILLION/UL (ref 3.81–5.12)
RBC MORPH BLD: PRESENT
SODIUM SERPL-SCNC: 135 MMOL/L (ref 135–147)
TSH SERPL DL<=0.05 MIU/L-ACNC: 0.71 UIU/ML (ref 0.45–4.5)
VARIANT LYMPHS # BLD AUTO: 1 %
WBC # BLD AUTO: 5.23 THOUSAND/UL (ref 4.31–10.16)

## 2023-03-07 NOTE — PROGRESS NOTES
Pt to clinic for labs drawn via port  Pt offers no complaints today  Thomasina Cooks Pt aware of next appointment  Pt port accessed, flushed, and de-accessed with positive blood returned  AVS was offered, pt declined  Pt ambulated out of clinic safely

## 2023-03-07 NOTE — PROGRESS NOTES
Hematology/Oncology Outpatient Office Note    Date of Service: 3/16/2023    [facility] HEMATOLOGY ONCOLOGY SPECIALISTS [address] [facility]    Reason for Consultation:   Chief Complaint   Patient presents with   • Follow-up     Pt presents for a f/u for Carcinoma of right breast metastatic to axillary lymph node  Pt reports doing well with no new pain  Last labs 3/14/2023  Pt having concerns with chemo change  Pt also having issues with swelling  Cancer Stage at diagnosis: IIIB    Referral Physician: No ref  provider found    Primary Care Physician:  Geno Araya PA-C     Nickname: Angelika Jacome    Spouse: Mandeep    Original ECO    Today's ECO     Goals and Barriers:  Current Goal: Minimize effects of disease burden, extend life  Barriers to accomplishing this: None    Patient's Capacity to Self Care:  Patient is able to self care    Code Status: not addressed today    Advanced directives: not addressed today    ASSESSMENT & PLAN      Diagnosis ICD-10-CM Associated Orders   1  Malignant neoplasm of overlapping sites of right female breast, unspecified estrogen receptor status ([facility] )  C50 811             This is a 42 y o  c PMHx notable for asthma, HTN, being seen in consultation for locally advanced triple negative breast cancer        Infusion reaction C1D8 to Taxol, increased Dex to 20mg in premed and didn't tolerate well so thus changed to Docetaxel for C4  Discussion of decision making  • Oncology history updated, accordingly, during this visit  • Goals of care/patient communication  o I discussed with the patient the clinical course leading up to their cancer diagnosis  I reviewed relevant office notes, imaging reports and pathology result as well   o I told the patient that this is a case of curable disease and what this means   We discussed that the goal of anti-cancer therapy is to provide best quality of life, extend overall survival, and progression free survival as shown in clinical trials  We also discussed that there might be a point when the cancer will no longer respond to this anti-neoplastic therapy   o I explained the risks/benefits of the proposed cancer therapy: Pembro + Carbo-Taxol for 4 cycles then 4 cycles of Pembro + adriamycin/cytoxan followed by surgery and then maintenance Pembro (for 9 cycles) and after discussion including understanding risks of possible life-threatening complications and therapy-related malignancy development, informed consent for blood products and treatment has been signed and obtained     • TNM/Staging At Diagnosis  o [id number]  Cancer Staging   IIIB  • Disease Features/Tumor Markers/Genetics  o Tumor Marker: n/a  o Notable Path Features: 12/22/2022: Invasive mammary carcinoma, suma grade 2, nuclear grade 3  ER/TN/HER-2 0, Ki-67 50%, nuclear grade 3  • Treatment: neoadjuvant Pembro-Carbo-taxol followed by [facility], then surgery, then maintenance Pembro  • Other Supportive care: Neulasta  • Treatment Team Members  o Surgeon: Dr Eric Slider  o 1/24/23: WBC 11 22, Hgb 13, MCV 92, PLT 323K, diff normal  CMP normal    • Diagnostics  12/15/2022 Right Mammogram diagnostic bilateral w 3d & cad: There is an ill-defined approximately 5 cm focal asymmetry seen in the inner central region of the right breast in the anterior depth  The asymmetry correlates with the palpable mass reported by the patient  US breast right limited (diagnostic): Within the area of palpable concern, there are multiple ill-defined hypoechoic masses  Biopsy of the 17 mm x 13 mm x 23 mm irregularly shaped, hypoechoic mass with indistinct margins in the right breast at 2 o'clock, 6 cm from the nipple and 20 mm x 7 mm x 28 mm irregularly shaped, hypoechoic mass with indistinct margins seen in the right breast at 4 o'clock, 6 cm from the nipple are recommended  US breast right limited (diagnostic):  A lymph node with focally thickened cortex is noted in the right axilla  12/29/2022 CT CAP w/c: Multiple small lung nodules  These may be inflammatory though a three-month follow-up is recommended to completely exclude metastasis  Unremarkable abdomen and pelvis without evidence of metastatic disease  Minimal paraseptal emphysema at the apices  1/4/2023 Nuc Bone scan: No scintigraphic evidence of osseous metastasis  1/9/2023: TTE LVEF 60%      We discussed possibility of infertility on chemo on 1/4/2023 but she has deferred fertility referral as she is not interested in having more children  Discussion of decision making    I personally reviewed the following lab results, the image studies, pathology, other specialty/physicians consult notes and recommendations, and outside medical records from [facility]  I had a lengthy discussion with the patient and shared the work-up findings  We discussed the diagnosis and management plan as below  I spent 51 minutes reviewing the records (labs, clinician notes, outside records, medical history, ordering medicine/tests/procedures, interpreting the imaging/labs previously done) and coordination of care as well as direct time with the patient today, of which greater than 50% of the time was spent in counseling and coordination of care with the patient/family  · Plan/Labs  · C4 Carbo-Docetaxel coming up with preceding labs  ? Plan --> Neoadjuvant Pembro-Carbo-taxol for 4 cycles then 4 cycles of Pembro + adriamycin/cytoxan followed by surgery  After surgery, plan is for maintenance Pembro for 9 cycles  Labs preceding chemo/immunotherapy to be completed with CBC, CMP, TSH  · Zofran 8mg q8 prn nausea/vomiting previously sent home  · F/u Surg Onc for eventual goal of surgery  · F/u genetics counselor testing      Follow Up: q3 weeks for 8 total visits alternating between MD & PAManC scheduled thru 9/21/2023    All questions were answered to the patient's satisfaction during this encounter   The patient knows the contact information for our office and knows to reach out for any relevant concerns related to this encounter  They are to call for any temperature 100 4 or higher, new symptoms including but not restricted to shaking chills, decreased appetite, nausea, vomiting, diarrhea, increased fatigue, shortness of breath or chest pain, confusion, and not feeling the strength to come to the clinic  For all other listed problems and medical diagnosis in their chart - they are managed by PCP and/or other specialists, which the patient acknowledges  Thank you very much for your consultation and making us a part of this patient's care  We are continuing to follow closely with you  Please do not hesitate to reach out to me with any additional questions or concerns  Alexandra Del Toro MD  Hematology & Medical Oncology Staff Physician          Disclaimer: This document was prepared using [employer] Fluency Direct technology  If a word or phrase is confusing, or does not make sense, this is likely due to recognition error which was not discovered during this clinician's review  If you believe an error has occurred, please contact me through [address] [place] [employer] line for regina? cation        ONCOLOGY HISTORY OF PRESENT ILLNESS        Oncology History   Malignant neoplasm of overlapping sites of right female breast ([facility] ) (Resolved)   12/29/2022 Initial Diagnosis    Malignant neoplasm of overlapping sites of right female breast [facility])     Carcinoma of right breast metastatic to axillary lymph node ([facility] )   12/22/2022 -  Cancer Staged    Staging form: Breast, AJCC 8th Edition  - Clinical stage from 12/22/2022: Stage IIIB (cT3, cN1, cM0, G2, ER-, IL-, HER2-) - Signed by Alexandra Del Toro MD on 1/4/2023  Stage prefix: Initial diagnosis  Nuclear grade: G3  Mitotic count score: Score 1  Histologic grading system: 3 grade system  Menopausal status: Premenopausal       12/22/2022 Initial Diagnosis    Triple negative breast cancer ([facility] )     12/22/2022 Biopsy    Right breast ultrasound guided biopsy  A  2 o'clock, 6cm from nipple  Invasive mammary carcinoma of no special type (ductal NST/invasive ductal carcinoma)   Grade 2  ER 0  NJ 0   HER2 0  Ki-67 50%    B  4 o'clock, 6cm from nipple  Invasive mammary carcinoma of no special type (ductal NST/invasive ductal carcinoma)   Grade 2   ER 0   NJ 0   HER2 2+   FISH negative  Ki-67 40%     C  Right Axillary lymph node  Metastatic carcinoma consistent with mammary primary     12/29/2022 Initial Diagnosis    Carcinoma of right breast metastatic to axillary lymph node ([facility] )     12/30/2022 Genetic Testing    [facility]  A total of 36 genes were evaluated, including: FRANKO, BRCA1, BRCA2, CDH1, CHEK2, PALB2, PTEN, STK11, TP53  Negative result   No pathogenic sequence variants or deletions/duplications identified       1/13/2023 -  Chemotherapy    DOXOrubicin (ADRIAMYCIN), 60 mg/m2 = 113 mg, Intravenous, Once, 0 of 4 cycles  cyclophosphamide (CYTOXAN) IVPB, 600 mg/m2 = 1,134 mg, Intravenous, Once, 0 of 4 cycles  CARBOplatin (PARAPLATIN) IVPB (GO AUC DOSING), 201 9 mg (83 1 % of original dose 243 mg), Intravenous, Once, 4 of 4 cycles  Dose modification:   (original dose 243 mg, Cycle 1, Reason: Other (Must fill in a comment)),   (original dose 243 mg, Cycle 1)  Administration: 201 9 mg (1/13/2023), 206 7 mg (1/20/2023), 175 2 mg (2/3/2023), 206 7 mg (1/27/2023), 206 7 mg (2/10/2023), 206 7 mg (2/24/2023), 206 7 mg (2/17/2023), 206 7 mg (3/3/2023), 206 7 mg (3/10/2023)  DOCEtaxel (TAXOTERE) chemo infusion, 75 mg/m2 = 141 8 mg (100 % of original dose 75 mg/m2), Intravenous, Once, 1 of 1 cycle  Dose modification: 75 mg/m2 (original dose 75 mg/m2, Cycle 4)  PACLItaxel (TAXOL) chemo IVPB, 80 mg/m2 = 151 2 mg, Intravenous, Once, 3 of 3 cycles  Administration: 151 2 mg (1/13/2023), 151 2 mg (1/20/2023), 151 2 mg (2/3/2023), 151 2 mg (1/27/2023), 151 2 mg (2/10/2023), 151 2 mg (2/24/2023), 151 2 mg (2/17/2023), 151 2 mg (3/3/2023), 151 2 mg (3/10/2023)  aprepitant (CINVANTI) in  mL IVPB, 130 mg, Intravenous, Once, 0 of 4 cycles  pembrolizumab (KEYTRUDA) IVPB, 200 mg, Intravenous, Once, 4 of 17 cycles  Administration: 200 mg (1/13/2023), 200 mg (2/3/2023), 200 mg (2/24/2023)     Triple negative breast cancer ([facility] [address] )   12/22/2022 Initial Diagnosis    Triple negative breast cancer ([facility] )     12/22/2022 Biopsy    Right breast ultrasound guided biopsy  A  2 o'clock, 6cm from nipple  Invasive mammary carcinoma of no special type (ductal NST/invasive ductal carcinoma)   Grade 2  ER 0  ID 0   HER2 0  Ki-67 50%    B  4 o'clock, 6cm from nipple  Invasive mammary carcinoma of no special type (ductal NST/invasive ductal carcinoma)   Grade 2   ER 0   ID 0   HER2 2+   FISH negative  Ki-67 40%     C  Right Axillary lymph node  Metastatic carcinoma consistent with mammary primary     12/30/2022 Genetic Testing    [facility]  A total of 36 genes were evaluated, including: FRANKO, BRCA1, BRCA2, CDH1, CHEK2, PALB2, PTEN, STK11, TP53  Negative result  No pathogenic sequence variants or deletions/duplications identified             SUBJECTIVE  (INTERVAL HISTORY)        Interval History Swelling in hands, legs, lips for the past month and HA  The swelling has mostly resolved  She is having intermittent HA  No new vision changes, CP, SOB, rashes, Abd pain, N/V/D  No bleeding anywhere  She takes Oxycodone from palliative care for diffuse pain  I have reviewed the relevant past medical, surgical, social and family history  I have also reviewed allergies and medications for this patient  Review of Systems  Review of Systems   Constitutional: Negative for chills, decreased appetite, diaphoresis, fever, malaise/fatigue, night sweats, weight gain and weight loss  HENT: Negative for nosebleeds  Eyes: Negative for blurred vision, double vision, vision loss in left eye and vision loss in right eye     Cardiovascular: Negative for chest pain, cyanosis, dyspnea on exertion, irregular heartbeat, leg swelling and palpitations  Respiratory: Negative for cough, hemoptysis, shortness of breath, sleep disturbances due to breathing, snoring, sputum production and wheezing  Endocrine: Negative for cold intolerance  Hematologic/Lymphatic: Negative for adenopathy and bleeding problem  Does not bruise/bleed easily  Skin: Negative for color change, dry skin, flushing, nail changes, poor wound healing, rash, skin cancer and suspicious lesions  Musculoskeletal: Negative for arthritis, back pain, joint pain, muscle cramps and muscle weakness  Genitourinary: Negative for bladder incontinence, frequency, hematuria, hesitancy, incomplete emptying, menorrhagia, pelvic pain and urgency  Neurological: Negative for difficulty with concentration, excessive daytime sleepiness, dizziness, light-headedness, loss of balance and weakness  Psychiatric/Behavioral: Negative for altered mental status  The patient is nervous/anxious  A 10-point review of system was performed, pertinent positive and negative were detailed as above  Otherwise, the 10-point review of system was negative        Past Medical History:   Diagnosis Date   • Asthma    • Hypertension        Past Surgical History:   Procedure Laterality Date   •  SECTION     • IR PORT PLACEMENT  2023   • MRI BREAST BIOPSY LEFT (ALL INCLUSIVE) Left 2023   • TUBAL LIGATION     • US GUIDANCE BREAST BIOPSY RIGHT EACH ADDITIONAL Right 2022   • US GUIDED BREAST BIOPSY RIGHT COMPLETE Right 2022   • US GUIDED BREAST LYMPH NODE BIOPSY RIGHT Right 2022       Family History   Problem Relation Age of Onset   • Lung cancer Mother 61   • Ovarian cancer Mother    • No Known Problems Daughter    • No Known Problems Daughter    • Skin cancer Maternal Grandmother    • Ovarian cancer Maternal Grandmother    • Prostate cancer Maternal Grandfather    • Breast cancer Maternal Aunt    • Ovarian cancer Maternal Aunt    • Breast cancer Paternal Aunt        Social History     Socioeconomic History   • Marital status: Single     Spouse name: Not on file   • Number of children: Not on file   • Years of education: Not on file   • Highest education level: Not on file   Occupational History   • Not on file   Tobacco Use   • Smoking status: Every Day     Packs/day: 0 50     Years: 15 00     Pack years: 7 50     Types: Cigarettes   • Smokeless tobacco: Never   Vaping Use   • Vaping Use: Never used   Substance and Sexual Activity   • Alcohol use: Not Currently   • Drug use: Never   • Sexual activity: Yes     Partners: Male     Birth control/protection: Female Sterilization   Other Topics Concern   • Not on file   Social History Narrative   • Not on file     Social Determinants of Health     Financial Resource Strain: Not on file   Food Insecurity: Not on file   Transportation Needs: Not on file   Physical Activity: Not on file   Stress: Not on file   Social Connections: Not on file   Intimate Partner Violence: Not on file   Housing Stability: Not on file       No Known Allergies    Current Outpatient Medications   Medication Sig Dispense Refill   • albuterol (PROVENTIL HFA,VENTOLIN HFA) 90 mcg/act inhaler inhale 2 puffs by mouth and INTO THE LUNGS every 6 hours if needed for wheezing 18 g 11   • amLODIPine (NORVASC) 5 mg tablet Take 2 tablets (10 mg total) by mouth daily 60 tablet 2   • CRANIAL PROSTHESIS, RX, Apply to area of concern for alopecia as needed  1 each 0   • fexofenadine (ALLEGRA) 180 MG tablet Take 1 tablet (180 mg total) by mouth daily 30 tablet 5   • lidocaine-prilocaine (EMLA) cream Apply topically as needed for mild pain 30 g 0   • LORazepam (Ativan) 0 5 mg tablet Take 0 5 tablets (0 25 mg total) by mouth 2 (two) times a day 14 tablet 0   • melatonin 3 mg Take 1 tablet (3 mg total) by mouth daily at bedtime 30 tablet 5   • naloxone (NARCAN) 4 mg/0 1 mL nasal spray Administer 1 spray into a nostril   If no response after 2-3 minutes, give another dose in the other nostril using a new spray  1 each 1   • oxyCODONE (ROXICODONE) 5 immediate release tablet Take 1 tablet (5 mg total) by mouth every 6 (six) hours as needed for moderate pain Max Daily Amount: 20 mg 30 tablet 0   • senna-docusate sodium (SENOKOT-S) 8 6-50 mg per tablet Take 1 tablet by mouth daily 30 tablet 5   • Symbicort 160-4 5 MCG/ACT inhaler inhale 2 puffs by mouth twice a day Rinse mouth after use 10 2 g 5   • montelukast (SINGULAIR) 10 mg tablet take 1 tablet by mouth at bedtime (Patient not taking: Reported on 2/14/2023) 30 tablet 5   • ondansetron (ZOFRAN-ODT) 8 mg disintegrating tablet Take 1 tablet (8 mg total) by mouth every 8 (eight) hours as needed for nausea or vomiting (Patient not taking: Reported on 2/14/2023) 20 tablet 0     No current facility-administered medications for this visit  Facility-Administered Medications Ordered in Other Visits   Medication Dose Route Frequency Provider Last Rate Last Admin   • alteplase (CATHFLO) injection 2 mg  2 mg Intracatheter Q2H PRN Julio Cesar Mcnamara MD   2 mg at 03/14/23 1221       (Not in a hospital admission)      Objective:     24 Hour Vitals Assessment:     Vitals:    03/16/23 0845   BP: 124/80   Pulse: 104   SpO2: 98%       PHYSICIAN EXAM:    General: Appearance: alert, cooperative, no distress  HEENT: Normocephalic, atraumatic  No scleral icterus  conjunctivae clear  EOMI  Chest: No tenderness to palpation  No open wound noted  Port in place  Lungs: Clear to auscultation bilaterally, Respirations unlabored  Cardiac: Tachycardia, +S1and S2  Abdomen: Soft, non-tender, non-distended  Bowel sounds are normal   Breast: L breast unremarkable, R breast with 5x4 cm mass over the R [place]-quadrant of the breast, no inverted nipple at the last visit*  Extremities:  No edema, cyanosis, clubbing  Skin: Skin color, turgor are normal  No rashes    Lymphatics: no palpable supra-cervical, axillary, or inguinal adenopathy  Neurologic: Awake, Alert, and oriented, no gross focal deficits noted b/l  DATA REVIEW:    Pathology Result:    Final Diagnosis   Date Value Ref Range Status   01/20/2023   Final    A  Left breast, 3:00 (MRI-guided core biopsy, 12 passes):     - Benign breast tissue with fibrocystic change  - Negative for atypia and malignancy in sampled material     Comment:  Clinical / radiologic correlation is recommended to ensure adequate sampling of the targeted lesion  12/22/2022   Final    A  Ultrasound biopsy right breast 2:00, 6 cm from nipple, 4 passes 14-gauge marquee:      - Invasive mammary carcinoma of no special type (ductal NST/invasive ductal carcinoma), see note      - Laverne grade 2 of 3 (total score: 7 of 9)          - Tubule formation: <10%, score 3          - Nuclear grade 3 of 3, score 3          - Mitoses < 3/mm2, (</= 7 mitoses/10HPF), score 1      - Invasive carcinoma involves 4 of 4 submitted core biopsies, max  dimension = 16 millimeters      - Ductal carcinoma in situ (DCIS): Not identified      - Lymphovascular invasion: Not identified      - Microcalcifications: Absent      - See synoptic report A    B  Right breast ultrasound biopsy at 4:00, 6 cm from nipple, 4 passes, 14-gauge marquee:      - Invasive mammary carcinoma of no special type (ductal NST/invasive ductal carcinoma) with apocrine features, see note      - [place] grade 2 of 3 (total score: 6 of 9)          - Tubule formation: 10% to 75%, score 2          - Nuclear grade 3 of 3, score 3          - Mitoses < 3/mm2, (</= 7 mitoses/10HPF), score 1      - Invasive carcinoma involves 4 of 4 submitted core biopsies, max  dimension = 15 millimeters      - Ductal carcinoma in situ (DCIS): Not identified      - Lymphovascular invasion: Not identified      - Microcalcifications: Absent      - See synoptic report B    C   Ultrasound right breast biopsy and axillary lymph node, 4 passes 14-gauge marquee:      - Metastatic carcinoma, consistent with mammary primary, see note           Image Results:   Image result are reviewed and documented in Hematology/Oncology history    MRI breast biopsy left (all inclusive)  Addendum: ADDENDUM:     PATHOLOGY RESULTS:    D) Non-mass Enhancement (Left; 3 o'clock; Middle; 2 5 cm from nipple)   Benign finding: the pathology findings indicate benign breast tissue and  fibrocystic change  This is concordant with imaging  Surgical consultation for biopsy confirmed malignancy in the right breast  recommended  RECOMMENDATION:        - Return to Routine Screening for the left breast         - Surgical Consultation for the right breast      END ADDENDUM  Narrative: DIAGNOSIS: Triple negative breast cancer ([facility] [address] )    TECHNIQUE:  MRI of the breast was performed in axial and sagittal planes utilizing a   combination of sagittal dynamic T1 pre- and post contrast imaging with fat   suppression in addition to axial post contrast fat saturated T1  Intravenous contrast was administered at 2cc/sec, without documented   contrast reaction  Prior to the procedure, previous imaging was reviewed  The procedure was   explained to the patient in detail  All questions were answered  Written   and verbal informed consent was obtained  MEDICATIONS ADMINISTERED:        INDICATION: Michael Arteaga is a 42 y o  female presenting for clumped   non-mass enhancement 3:00 left breast     FINDINGS:   LEFT  D) NON-MASS ENHANCEMENT: Images of the left breast non-mass enhancement at   3 o'clock in the middle portion, 2 5 cm from the nipple were obtained  The   patient was placed prone on the biopsy table and a timeout was performed  A core needle biopsy was performed under MRI guidance using a lateral   approach  The skin was prepped in the usual fashion  Anesthesia was   administered using 5 mL of lidocaine 1% and 10 mL of lidocaine 1% with   epinephrine  A 12 G device was advanced     A clip was inserted into the target area  A stoplight biopsy clip marker   is placed in the target area  Post-placement MR imaging demonstrates the   clip was at the site  The patient tolerated the procedure well  There were   no immediate complications  Impression:    MRI guided biopsy for clumped non-mass enhancement 3:00 left breast     RECOMMENDATION:       - Waiting for pathology for the left breast      Workstation ID: [id number]      LABS:  Lab data are reviewed and documented in HemOnc history         Lab Results   Component Value Date    HGB 12 3 03/14/2023    HCT 36 4 03/14/2023    MCV 97 03/14/2023     03/14/2023    WBC 6 87 03/14/2023    NRBC 0 03/14/2023    ATYLMPCT 1 (H) 03/07/2023     Lab Results   Component Value Date    K 3 9 03/14/2023     03/14/2023    CO2 26 03/14/2023    BUN 12 03/14/2023    CREATININE 0 57 (L) 03/14/2023    GLUF 105 (H) 02/21/2023    CALCIUM 9 5 03/14/2023    CORRECTEDCA 9 3 01/17/2023    AST 13 03/14/2023    ALT 20 03/14/2023    ALKPHOS 60 03/14/2023    EGFR 117 03/14/2023       No results found for: IRON, TIBC, FERRITIN    No results found for: [id number]    Recent Labs     03/14/23  1259   WBC 6 87       By:  Savannah Eli MD, 3/16/2023, 9:08 AM

## 2023-03-08 RX ORDER — LORAZEPAM 0.5 MG/1
0.25 TABLET ORAL 2 TIMES DAILY
Qty: 14 TABLET | Refills: 0 | Status: SHIPPED | OUTPATIENT
Start: 2023-03-08

## 2023-03-08 NOTE — TELEPHONE ENCOUNTER
Update re anxiety  Pt called office in am to update Provider on her status and use of medications  Spoke to pt at 230 pm  Pt reports she has been taking meds as follows  Lorazepam 0 5 mg tablet ( taking half tablet or   25 mg ) at HS  Pt sattes she is sleeping better and feels better for a few hours in am  However as day progresses she begins to have the feeling of anxiety  Oxycodone 5 mg IR   Takes 1 tab in evening  and 1 mid day  ( 2 tablets total) has managed her pain  States the more she moves the more her pain increases, legs sore, difficult to walk up stairs  C/O racing heart rate  Per pt Onc has told her this is normal/expected re chemo  Understands driving restriction if taking these medications  States she does have others to help with picking up son from school in afternoon if needed  Asking if possible to take dose of Lorazepam sometime midday if needed       Thank you

## 2023-03-08 NOTE — TELEPHONE ENCOUNTER
Thank you   Pt notified of same  Instructed if any issues with anxiety or issues with meds please call with status       Done task

## 2023-03-10 ENCOUNTER — HOSPITAL ENCOUNTER (OUTPATIENT)
Dept: INFUSION CENTER | Facility: CLINIC | Age: 38
End: 2023-03-10

## 2023-03-10 ENCOUNTER — TELEPHONE (OUTPATIENT)
Dept: HEMATOLOGY ONCOLOGY | Facility: CLINIC | Age: 38
End: 2023-03-10

## 2023-03-10 VITALS
OXYGEN SATURATION: 100 % | RESPIRATION RATE: 20 BRPM | BODY MASS INDEX: 31.41 KG/M2 | SYSTOLIC BLOOD PRESSURE: 132 MMHG | DIASTOLIC BLOOD PRESSURE: 78 MMHG | WEIGHT: 184 LBS | HEIGHT: 64 IN | HEART RATE: 91 BPM | TEMPERATURE: 98.1 F

## 2023-03-10 DIAGNOSIS — C50.911 CARCINOMA OF RIGHT BREAST METASTATIC TO AXILLARY LYMPH NODE (HCC): Primary | ICD-10-CM

## 2023-03-10 DIAGNOSIS — C77.3 CARCINOMA OF RIGHT BREAST METASTATIC TO AXILLARY LYMPH NODE (HCC): Primary | ICD-10-CM

## 2023-03-10 RX ORDER — SODIUM CHLORIDE 9 MG/ML
20 INJECTION, SOLUTION INTRAVENOUS ONCE
Status: COMPLETED | OUTPATIENT
Start: 2023-03-10 | End: 2023-03-10

## 2023-03-10 RX ORDER — FAMOTIDINE 10 MG/ML
INJECTION, SOLUTION INTRAVENOUS
Status: DISPENSED
Start: 2023-03-10 | End: 2023-03-10

## 2023-03-10 RX ADMIN — PACLITAXEL 151.2 MG: 6 INJECTION, SOLUTION, CONCENTRATE INTRAVENOUS at 09:31

## 2023-03-10 RX ADMIN — DEXAMETHASONE SODIUM PHOSPHATE: 10 INJECTION, SOLUTION INTRAMUSCULAR; INTRAVENOUS at 08:03

## 2023-03-10 RX ADMIN — CARBOPLATIN 206.7 MG: 10 INJECTION, SOLUTION INTRAVENOUS at 10:06

## 2023-03-10 RX ADMIN — FAMOTIDINE 20 MG: 10 INJECTION, SOLUTION INTRAVENOUS at 08:52

## 2023-03-10 RX ADMIN — DIPHENHYDRAMINE HYDROCHLORIDE 25 MG: 50 INJECTION, SOLUTION INTRAMUSCULAR; INTRAVENOUS at 08:28

## 2023-03-10 RX ADMIN — SODIUM CHLORIDE 20 ML/HR: 0.9 INJECTION, SOLUTION INTRAVENOUS at 08:03

## 2023-03-10 NOTE — TELEPHONE ENCOUNTER
Received call from infusion RN  Patient experiencing palpitations, bad taste in mouth and prickly feeling in tongue after Taxol   Infusion stopped  Dr Wilfrid Siemens notified  Per Dr Wilfrid Siemens recommendations, OK to restart Taxol using Taxane titration protocol  Orders entered

## 2023-03-10 NOTE — TELEPHONE ENCOUNTER
Received call from infusion RN  Patient refusing reminder of Taxol infusion  Dr Tyson Jefferson notified  OK to continue with Carboplatin

## 2023-03-10 NOTE — PROGRESS NOTES
Pt presents for Taxol and Carboplatin offering no complaints  5 minutes into Taxol infusion (10mL delivered) pt alerted nursing staff reporting she felt palpitations and "bad" taste in mouth and "prickly feeling in tongue"  Taxol infusion stopped and line flushed  Pt denied any itching, SOB, chest tightness, or difficulty breathing  Kassie Pizano, RN called and reported per Dr Malou Bass Taxol to be restarted and titrated slowly  Plan explained to pt and pt refused remainder of Taxol  Kassie Pizano RN made aware and reported ok to give Carboplatin  Pt tolerated Carboplatin without incident  AVS declined  Next appointment reviewed

## 2023-03-14 ENCOUNTER — HOSPITAL ENCOUNTER (OUTPATIENT)
Dept: INFUSION CENTER | Facility: CLINIC | Age: 38
Discharge: HOME | End: 2023-03-14

## 2023-03-14 ENCOUNTER — TELEPHONE (OUTPATIENT)
Dept: HEMATOLOGY ONCOLOGY | Facility: CLINIC | Age: 38
End: 2023-03-14

## 2023-03-14 DIAGNOSIS — C77.3 CARCINOMA OF RIGHT BREAST METASTATIC TO AXILLARY LYMPH NODE (HCC): Primary | ICD-10-CM

## 2023-03-14 DIAGNOSIS — Z95.828 PORT-A-CATH IN PLACE: ICD-10-CM

## 2023-03-14 DIAGNOSIS — C50.911 CARCINOMA OF RIGHT BREAST METASTATIC TO AXILLARY LYMPH NODE (HCC): Primary | ICD-10-CM

## 2023-03-14 LAB
ALBUMIN SERPL BCP-MCNC: 4.2 G/DL (ref 3.5–5)
ALP SERPL-CCNC: 60 U/L (ref 34–104)
ALT SERPL W P-5'-P-CCNC: 20 U/L (ref 7–52)
ANION GAP SERPL CALCULATED.3IONS-SCNC: 6 MMOL/L (ref 4–13)
AST SERPL W P-5'-P-CCNC: 13 U/L (ref 13–39)
BASOPHILS # BLD AUTO: 0.04 THOUSANDS/ÂΜL (ref 0–0.1)
BASOPHILS NFR BLD AUTO: 1 % (ref 0–1)
BILIRUB SERPL-MCNC: 0.32 MG/DL (ref 0.2–1)
BUN SERPL-MCNC: 12 MG/DL (ref 5–25)
CALCIUM SERPL-MCNC: 9.5 MG/DL (ref 8.4–10.2)
CHLORIDE SERPL-SCNC: 104 MMOL/L (ref 96–108)
CO2 SERPL-SCNC: 26 MMOL/L (ref 21–32)
CREAT SERPL-MCNC: 0.57 MG/DL (ref 0.6–1.3)
EOSINOPHIL # BLD AUTO: 0.11 THOUSAND/ÂΜL (ref 0–0.61)
EOSINOPHIL NFR BLD AUTO: 2 % (ref 0–6)
ERYTHROCYTE [DISTWIDTH] IN BLOOD BY AUTOMATED COUNT: 15.6 % (ref 11.6–15.1)
GFR SERPL CREATININE-BSD FRML MDRD: 117 ML/MIN/1.73SQ M
GLUCOSE SERPL-MCNC: 112 MG/DL (ref 65–140)
HCT VFR BLD AUTO: 36.4 % (ref 34.8–46.1)
HGB BLD-MCNC: 12.3 G/DL (ref 11.5–15.4)
IMM GRANULOCYTES # BLD AUTO: 0.03 THOUSAND/UL (ref 0–0.2)
IMM GRANULOCYTES NFR BLD AUTO: 0 % (ref 0–2)
LYMPHOCYTES # BLD AUTO: 2.72 THOUSANDS/ÂΜL (ref 0.6–4.47)
LYMPHOCYTES NFR BLD AUTO: 40 % (ref 14–44)
MCH RBC QN AUTO: 32.6 PG (ref 26.8–34.3)
MCHC RBC AUTO-ENTMCNC: 33.8 G/DL (ref 31.4–37.4)
MCV RBC AUTO: 97 FL (ref 82–98)
MONOCYTES # BLD AUTO: 0.5 THOUSAND/ÂΜL (ref 0.17–1.22)
MONOCYTES NFR BLD AUTO: 7 % (ref 4–12)
NEUTROPHILS # BLD AUTO: 3.47 THOUSANDS/ÂΜL (ref 1.85–7.62)
NEUTS SEG NFR BLD AUTO: 50 % (ref 43–75)
NRBC BLD AUTO-RTO: 0 /100 WBCS
PLATELET # BLD AUTO: 264 THOUSANDS/UL (ref 149–390)
PMV BLD AUTO: 9.1 FL (ref 8.9–12.7)
POTASSIUM SERPL-SCNC: 3.9 MMOL/L (ref 3.5–5.3)
PROT SERPL-MCNC: 6.9 G/DL (ref 6.4–8.4)
RBC # BLD AUTO: 3.77 MILLION/UL (ref 3.81–5.12)
SODIUM SERPL-SCNC: 136 MMOL/L (ref 135–147)
TSH SERPL DL<=0.05 MIU/L-ACNC: 0.65 UIU/ML (ref 0.45–4.5)
WBC # BLD AUTO: 6.87 THOUSAND/UL (ref 4.31–10.16)

## 2023-03-14 RX ORDER — SODIUM CHLORIDE 9 MG/ML
20 INJECTION, SOLUTION INTRAVENOUS ONCE
Status: CANCELLED | OUTPATIENT
Start: 2023-03-24

## 2023-03-14 RX ORDER — SODIUM CHLORIDE 9 MG/ML
20 INJECTION, SOLUTION INTRAVENOUS ONCE
OUTPATIENT
Start: 2023-03-31

## 2023-03-14 RX ORDER — SODIUM CHLORIDE 9 MG/ML
20 INJECTION, SOLUTION INTRAVENOUS ONCE
Status: CANCELLED | OUTPATIENT
Start: 2023-03-17

## 2023-03-14 RX ADMIN — ALTEPLASE 2 MG: 2.2 INJECTION, POWDER, LYOPHILIZED, FOR SOLUTION INTRAVENOUS at 12:21

## 2023-03-14 NOTE — PROGRESS NOTES
Pt presents for lab specimen collection via port a cath  Port accessed, no blood return noted  Cathflo instilled, blood return noted after 30 minutes  Labs drawn, saline locked and de accessed without difficulty  Declined AVS, next appointment reviewed

## 2023-03-14 NOTE — PROGRESS NOTES
Title: Addition to Docetaxel/ removal of Paclitaxel    Date patient scheduled: 3/17/23    Original medication ordered: Paclitaxel    New Medication ordered: Docetaxel 75mg/m2 x 1 89m2    Office RN notified patient? ? Yes, consent obtained on 3/14/23 with Dr Kamilah Sanchez verbally  Is the patient scheduled within 24 hours? ? If yes, follow up with verbal telephone call  No     Office RN to route to INF  pool  Infusion  pool routes to INF [facility]  Infusion tech to receive message, confirm scheduled treatment duration matches ordered treatment duration or adjust accordingly, and re-link appointment request orders  Infusion tech to notify pharmacy and finance  My chart message sent to patient with Docetaxel oncolink and consent  Copy uploaded into patient chart  Reviewed patient to receive Docetaxel one time for 3/17/23  Dr Kamilah Sanchez sent email to oncology finance on 3/14/23

## 2023-03-14 NOTE — TELEPHONE ENCOUNTER
Call out to patient to obtain consent for Docetaxel to replace paclitaxel per Dr Michell Mart recommendations  Title: Addition to Docetaxel/ removal of Paclitaxel    Date patient scheduled: 3/17/23    Original medication ordered: Paclitaxel    New Medication ordered: Docetaxel 75mg/m2 x 1 89m2    Office RN notified patient? ? Yes, consent obtained on 3/14/23 with Dr Yogesh Segal verbally  Is the patient scheduled within 24 hours? ? If yes, follow up with verbal telephone call  No     Office RN to route to INF  pool  Infusion  pool routes to [facility]  Infusion tech to receive message, confirm scheduled treatment duration matches ordered treatment duration or adjust accordingly, and re-link appointment request orders  Infusion tech to notify pharmacy and finance  My chart message sent to patient with Docetaxel oncolink and consent  Copy uploaded into patient chart  Reviewed patient to receive Docetaxel one time for 3/17/23

## 2023-03-16 ENCOUNTER — OFFICE VISIT (OUTPATIENT)
Dept: HEMATOLOGY ONCOLOGY | Facility: CLINIC | Age: 38
End: 2023-03-16

## 2023-03-16 VITALS
SYSTOLIC BLOOD PRESSURE: 124 MMHG | HEIGHT: 64 IN | DIASTOLIC BLOOD PRESSURE: 80 MMHG | HEART RATE: 104 BPM | OXYGEN SATURATION: 98 % | WEIGHT: 184 LBS | BODY MASS INDEX: 31.41 KG/M2

## 2023-03-16 DIAGNOSIS — C50.811 MALIGNANT NEOPLASM OF OVERLAPPING SITES OF RIGHT FEMALE BREAST, UNSPECIFIED ESTROGEN RECEPTOR STATUS (HCC): Primary | ICD-10-CM

## 2023-03-17 ENCOUNTER — HOSPITAL ENCOUNTER (OUTPATIENT)
Dept: INFUSION CENTER | Facility: CLINIC | Age: 38
End: 2023-03-17

## 2023-03-17 VITALS
BODY MASS INDEX: 31.34 KG/M2 | RESPIRATION RATE: 18 BRPM | DIASTOLIC BLOOD PRESSURE: 73 MMHG | WEIGHT: 183.6 LBS | TEMPERATURE: 97.7 F | HEART RATE: 101 BPM | SYSTOLIC BLOOD PRESSURE: 136 MMHG | HEIGHT: 64 IN

## 2023-03-17 DIAGNOSIS — C50.911 CARCINOMA OF RIGHT BREAST METASTATIC TO AXILLARY LYMPH NODE (HCC): Primary | ICD-10-CM

## 2023-03-17 DIAGNOSIS — C77.3 CARCINOMA OF RIGHT BREAST METASTATIC TO AXILLARY LYMPH NODE (HCC): Primary | ICD-10-CM

## 2023-03-17 RX ORDER — SODIUM CHLORIDE 9 MG/ML
20 INJECTION, SOLUTION INTRAVENOUS ONCE
Status: COMPLETED | OUTPATIENT
Start: 2023-03-17 | End: 2023-03-17

## 2023-03-17 RX ADMIN — DIPHENHYDRAMINE HYDROCHLORIDE 25 MG: 50 INJECTION, SOLUTION INTRAMUSCULAR; INTRAVENOUS at 08:37

## 2023-03-17 RX ADMIN — SODIUM CHLORIDE 200 MG: 9 INJECTION, SOLUTION INTRAVENOUS at 09:40

## 2023-03-17 RX ADMIN — DEXAMETHASONE SODIUM PHOSPHATE: 10 INJECTION, SOLUTION INTRAMUSCULAR; INTRAVENOUS at 08:12

## 2023-03-17 RX ADMIN — FAMOTIDINE 20 MG: 10 INJECTION, SOLUTION INTRAVENOUS at 09:01

## 2023-03-17 RX ADMIN — DOCETAXEL 141.8 MG: 20 INJECTION, SOLUTION INTRAVENOUS at 10:33

## 2023-03-17 RX ADMIN — SODIUM CHLORIDE 20 ML/HR: 0.9 INJECTION, SOLUTION INTRAVENOUS at 07:50

## 2023-03-17 RX ADMIN — CARBOPLATIN 206.7 MG: 10 INJECTION, SOLUTION INTRAVENOUS at 11:41

## 2023-03-17 NOTE — PROGRESS NOTES
Pt to clinic for taxotere, carboplatin, and keytruda, offers no complaints today  Confirmed with Stas Helm RN per Dr Francisco Bowen pt to receive Molinda Bald, taxotere, and carboplatin today  Pt tolerated infusion without complications, aware of next appointment, port de-accessed, avs declined

## 2023-03-21 ENCOUNTER — HOSPITAL ENCOUNTER (OUTPATIENT)
Dept: INFUSION CENTER | Facility: CLINIC | Age: 38
Discharge: HOME | End: 2023-03-21

## 2023-03-21 DIAGNOSIS — C50.911 CARCINOMA OF RIGHT BREAST METASTATIC TO AXILLARY LYMPH NODE (HCC): Primary | ICD-10-CM

## 2023-03-21 DIAGNOSIS — C77.3 CARCINOMA OF RIGHT BREAST METASTATIC TO AXILLARY LYMPH NODE (HCC): Primary | ICD-10-CM

## 2023-03-21 DIAGNOSIS — Z95.828 PORT-A-CATH IN PLACE: ICD-10-CM

## 2023-03-21 LAB
ALBUMIN SERPL BCP-MCNC: 4.2 G/DL (ref 3.5–5)
ALP SERPL-CCNC: 70 U/L (ref 34–104)
ALT SERPL W P-5'-P-CCNC: 21 U/L (ref 7–52)
ANION GAP SERPL CALCULATED.3IONS-SCNC: 7 MMOL/L (ref 4–13)
AST SERPL W P-5'-P-CCNC: 16 U/L (ref 13–39)
BASOPHILS # BLD AUTO: 0.03 THOUSANDS/ÂΜL (ref 0–0.1)
BASOPHILS NFR BLD AUTO: 1 % (ref 0–1)
BILIRUB SERPL-MCNC: 0.87 MG/DL (ref 0.2–1)
BUN SERPL-MCNC: 11 MG/DL (ref 5–25)
CALCIUM SERPL-MCNC: 9.5 MG/DL (ref 8.4–10.2)
CHLORIDE SERPL-SCNC: 105 MMOL/L (ref 96–108)
CO2 SERPL-SCNC: 24 MMOL/L (ref 21–32)
CREAT SERPL-MCNC: 0.55 MG/DL (ref 0.6–1.3)
EOSINOPHIL # BLD AUTO: 0.06 THOUSAND/ÂΜL (ref 0–0.61)
EOSINOPHIL NFR BLD AUTO: 2 % (ref 0–6)
ERYTHROCYTE [DISTWIDTH] IN BLOOD BY AUTOMATED COUNT: 14.6 % (ref 11.6–15.1)
GFR SERPL CREATININE-BSD FRML MDRD: 118 ML/MIN/1.73SQ M
GLUCOSE P FAST SERPL-MCNC: 109 MG/DL (ref 65–99)
GLUCOSE SERPL-MCNC: 109 MG/DL (ref 65–140)
HCT VFR BLD AUTO: 36.1 % (ref 34.8–46.1)
HGB BLD-MCNC: 12.4 G/DL (ref 11.5–15.4)
IMM GRANULOCYTES # BLD AUTO: 0.02 THOUSAND/UL (ref 0–0.2)
IMM GRANULOCYTES NFR BLD AUTO: 1 % (ref 0–2)
LYMPHOCYTES # BLD AUTO: 1.98 THOUSANDS/ÂΜL (ref 0.6–4.47)
LYMPHOCYTES NFR BLD AUTO: 60 % (ref 14–44)
MCH RBC QN AUTO: 32.2 PG (ref 26.8–34.3)
MCHC RBC AUTO-ENTMCNC: 34.3 G/DL (ref 31.4–37.4)
MCV RBC AUTO: 94 FL (ref 82–98)
MONOCYTES # BLD AUTO: 0.05 THOUSAND/ÂΜL (ref 0.17–1.22)
MONOCYTES NFR BLD AUTO: 2 % (ref 4–12)
NEUTROPHILS # BLD AUTO: 1.09 THOUSANDS/ÂΜL (ref 1.85–7.62)
NEUTS SEG NFR BLD AUTO: 34 % (ref 43–75)
NRBC BLD AUTO-RTO: 0 /100 WBCS
PLATELET # BLD AUTO: 190 THOUSANDS/UL (ref 149–390)
PMV BLD AUTO: 10 FL (ref 8.9–12.7)
POTASSIUM SERPL-SCNC: 4.1 MMOL/L (ref 3.5–5.3)
PROT SERPL-MCNC: 6.9 G/DL (ref 6.4–8.4)
RBC # BLD AUTO: 3.85 MILLION/UL (ref 3.81–5.12)
SODIUM SERPL-SCNC: 136 MMOL/L (ref 135–147)
WBC # BLD AUTO: 3.23 THOUSAND/UL (ref 4.31–10.16)

## 2023-03-21 NOTE — PROGRESS NOTES
Pt to clinic for labs drawn via port  Pt offers no complaints today  Luis Huntley Pt aware of next appointment  Pt port accessed, flushed, and de-accessed with positive blood returned  AVS was offered, pt declined  Pt ambulated out of clinic safely

## 2023-03-22 ENCOUNTER — TELEPHONE (OUTPATIENT)
Dept: OTHER | Facility: HOSPITAL | Age: 38
End: 2023-03-22

## 2023-03-22 ENCOUNTER — TELEPHONE (OUTPATIENT)
Dept: OTHER | Facility: OTHER | Age: 38
End: 2023-03-22

## 2023-03-22 DIAGNOSIS — B37.0 THRUSH, ORAL: Primary | ICD-10-CM

## 2023-03-22 NOTE — TELEPHONE ENCOUNTER
Per Josefina Breaux PA-C patient to try nystatin and can be treated on Friday 3/24/23 if significantly resolved

## 2023-03-23 DIAGNOSIS — K12.30 MUCOSITIS: Primary | ICD-10-CM

## 2023-03-23 DIAGNOSIS — R21 RASH: Primary | ICD-10-CM

## 2023-03-23 NOTE — TELEPHONE ENCOUNTER
Was informed while on call regarding Ms Donavon Lafleur requesting for a call to address concerns regarding a rash  Chart reviewed  Ms Vaughn Barahona is a 42year old female with recently dx locally advanced TNBC  Currently on neoadjuvant therapy with Keytruda + weekly carboplatin and taxane (used to be paclitaxel previously, but this was changed to docetaxel with most recent infusion due to side effects/difficulty tolerating paclitaxel  Patient's most recent infusion was on 3/17/23  I called Ms Vaughn Barahona  She told me that she was doing fine the day of and after infusion on 3/17, but about 3 days ago, she started noticing several pimple like pustules/lesions first on the back of the her head, which then progressed to involve her temples/forehead, then later her entire back 1 day ago  Rash is pruritic in nature, small pustules are acne-like, and erythematous  She additionally described erythematous pruritic rash underneath her breasts and in her groin area (these are not acneiform like, just flat erythematous rash)  Patient also reported that her tongue has been completely white for past 1 day (looks like primary team had started nystatin swish and swallow earlier tin the day)  Patient denied any associated fevers, recent travel, or sick contacts  Denied any blisters or peeling of skin on the hand palms or foot soles  She did start applying a hydrocortisone lotion topically this evening, but it's too soon to make any call regarding its effectiveness  Taxanes can cause an acneiform/maculopapular rash and it's usually treated with low-moderate potency topical steroids  Additionally, it's possible that patient may have developed candidal infection/intertrigo in the groin and under breast areas  Patient may benefit from topical antifungal powder like nystatin for possible intertrigo rash      Discussed case with on call attending, Dr Feliz Yu, who recommended that the primary team be notified so that they can potentially evaluate the patient in person or via telemedicine appt sometime tomorrow so that rash is formally evaluated and appropriate management recommendations can be made  I informed the patient that she can continue application of the hydrocortisone lotion for the time being as instructed on the lotion tube until she hears from Dr Nurys Eli team tomorrow morning  Meanwhile, patient knows that if rash was to acutely or rapidly worsen or if she were to develop any systemic symptoms like fever or shortness of breath, she will need to get evaluated in the ED right away  Patient knows to reach out to the office if she doesn't hear from the team by around 10  30AM tomorrow  Forwarding this pt's primary oncology team so that they are also aware of the situation and can make the appropriate recommendations for the patient tomorrow

## 2023-03-24 ENCOUNTER — OFFICE VISIT (OUTPATIENT)
Dept: HEMATOLOGY ONCOLOGY | Facility: CLINIC | Age: 38
End: 2023-03-24

## 2023-03-24 ENCOUNTER — TELEPHONE (OUTPATIENT)
Dept: PALLIATIVE MEDICINE | Facility: CLINIC | Age: 38
End: 2023-03-24

## 2023-03-24 ENCOUNTER — HOSPITAL ENCOUNTER (OUTPATIENT)
Dept: INFUSION CENTER | Facility: CLINIC | Age: 38
End: 2023-03-24

## 2023-03-24 VITALS
BODY MASS INDEX: 31.69 KG/M2 | RESPIRATION RATE: 18 BRPM | SYSTOLIC BLOOD PRESSURE: 112 MMHG | TEMPERATURE: 98.9 F | DIASTOLIC BLOOD PRESSURE: 84 MMHG | HEART RATE: 113 BPM | WEIGHT: 185.6 LBS | HEIGHT: 64 IN

## 2023-03-24 VITALS
RESPIRATION RATE: 18 BRPM | TEMPERATURE: 98.9 F | HEART RATE: 113 BPM | BODY MASS INDEX: 31.58 KG/M2 | SYSTOLIC BLOOD PRESSURE: 112 MMHG | DIASTOLIC BLOOD PRESSURE: 84 MMHG | HEIGHT: 64 IN | WEIGHT: 185 LBS

## 2023-03-24 DIAGNOSIS — L65.8 ALOPECIA DUE TO CYTOTOXIC DRUG: ICD-10-CM

## 2023-03-24 DIAGNOSIS — R21 RASH: ICD-10-CM

## 2023-03-24 DIAGNOSIS — C50.811 MALIGNANT NEOPLASM OF OVERLAPPING SITES OF RIGHT FEMALE BREAST, UNSPECIFIED ESTROGEN RECEPTOR STATUS (HCC): ICD-10-CM

## 2023-03-24 DIAGNOSIS — C77.3 CARCINOMA OF RIGHT BREAST METASTATIC TO AXILLARY LYMPH NODE (HCC): Primary | ICD-10-CM

## 2023-03-24 DIAGNOSIS — C50.911 CARCINOMA OF RIGHT BREAST METASTATIC TO AXILLARY LYMPH NODE (HCC): Primary | ICD-10-CM

## 2023-03-24 DIAGNOSIS — B37.2 CANDIDAL SKIN INFECTION: ICD-10-CM

## 2023-03-24 DIAGNOSIS — T45.1X5A ADVERSE EFFECT OF CHEMOTHERAPY, INITIAL ENCOUNTER: Primary | ICD-10-CM

## 2023-03-24 DIAGNOSIS — T45.1X5A ALOPECIA DUE TO CYTOTOXIC DRUG: ICD-10-CM

## 2023-03-24 DIAGNOSIS — C50.919 TRIPLE NEGATIVE BREAST CANCER (HCC): ICD-10-CM

## 2023-03-24 RX ORDER — TRIAMCINOLONE ACETONIDE 1 MG/G
CREAM TOPICAL 2 TIMES DAILY
Qty: 30 G | Refills: 0 | Status: SHIPPED | OUTPATIENT
Start: 2023-03-24 | End: 2023-04-07

## 2023-03-24 RX ORDER — NYSTATIN 100000 [USP'U]/G
POWDER TOPICAL 4 TIMES DAILY
Qty: 15 G | Refills: 0 | Status: SHIPPED | OUTPATIENT
Start: 2023-03-24 | End: 2023-04-07

## 2023-03-24 RX ORDER — SODIUM CHLORIDE 9 MG/ML
20 INJECTION, SOLUTION INTRAVENOUS ONCE
Status: COMPLETED | OUTPATIENT
Start: 2023-03-24 | End: 2023-03-24

## 2023-03-24 RX ADMIN — SODIUM CHLORIDE 20 ML/HR: 0.9 INJECTION, SOLUTION INTRAVENOUS at 08:18

## 2023-03-24 RX ADMIN — CARBOPLATIN 206.7 MG: 10 INJECTION, SOLUTION INTRAVENOUS at 09:40

## 2023-03-24 RX ADMIN — DIPHENHYDRAMINE HYDROCHLORIDE 25 MG: 50 INJECTION, SOLUTION INTRAMUSCULAR; INTRAVENOUS at 08:42

## 2023-03-24 RX ADMIN — DEXAMETHASONE SODIUM PHOSPHATE: 10 INJECTION, SOLUTION INTRAMUSCULAR; INTRAVENOUS at 08:18

## 2023-03-24 RX ADMIN — FAMOTIDINE 20 MG: 10 INJECTION, SOLUTION INTRAVENOUS at 09:06

## 2023-03-24 NOTE — PROGRESS NOTES
Hematology/Oncology Outpatient Office Note    Date of Service: 3/24/2023    [facility] HEMATOLOGY ONCOLOGY SPECIALISTS [address]    Reason for Consultation:   Chief Complaint   Patient presents with   • Follow-up       Cancer Stage at diagnosis: IIIB    Referral Physician: No ref  provider found    Primary Care Physician:  Leopoldo [place], PA-C     Nickname: [place]    Spouse: Mandeep    Original ECO    Today's ECO     Goals and Barriers:  Current Goal: Minimize effects of disease burden, extend life  Barriers to accomplishing this: None    Patient's Capacity to Self Care:  Patient is able to self care    Code Status: not addressed today    Advanced directives: not addressed today    ASSESSMENT & PLAN      Diagnosis ICD-10-CM Associated Orders   1  Adverse effect of chemotherapy, initial encounter  T45 1X5A triamcinolone (KENALOG) 0 1 % cream      2  Candidal skin infection  B37 2 nystatin (MYCOSTATIN) powder      3  Rash  R21 triamcinolone (KENALOG) 0 1 % cream      4  Malignant neoplasm of overlapping sites of right female breast, unspecified estrogen receptor status ([facility] )  C50 811       5  Alopecia due to cytotoxic drug  L65 8     T45  1X5A       6  [facility] ([facility] )  C50 919             This is a 42 y o  c PMHx notable for asthma, HTN, being seen in consultation for locally advanced triple negative breast cancer        Infusion reaction C1D8 to Taxol, increased Dex to 20mg in premed and didn't tolerate well so thus changed to Docetaxel for C4  Discussion of decision making  • Oncology history updated, accordingly, during this visit  • Goals of care/patient communication  o I discussed with the patient the clinical course leading up to their cancer diagnosis  I reviewed relevant office notes, imaging reports and pathology result as well   o I told the patient that this is a case of curable disease and what this means   We discussed that the goal of anti-cancer therapy is to provide best quality of life, extend overall survival, and progression free survival as shown in clinical trials  We also discussed that there might be a point when the cancer will no longer respond to this anti-neoplastic therapy   o I explained the risks/benefits of the proposed cancer therapy: Pembro + Carbo-Taxol for 4 cycles then 4 cycles of Pembro + adriamycin/cytoxan followed by surgery and then maintenance Pembro (for 9 cycles) and after discussion including understanding risks of possible life-threatening complications and therapy-related malignancy development, informed consent for blood products and treatment has been signed and obtained     • TNM/Staging At Diagnosis  o [id number]  Cancer Staging   IIIB  • Disease Features/Tumor Markers/Genetics  o Tumor Marker: n/a  o Notable Path Features: 12/22/2022: Invasive mammary carcinoma, suma grade 2, nuclear grade 3  ER/RI/HER-2 0, Ki-67 50%, nuclear grade 3  • Treatment: neoadjuvant Pembro-Carbo-taxol followed by [facility], then surgery, then maintenance Pembro  • Other Supportive care: Neulasta  • Treatment Team Members  o Surgeon: Dr Michelel Holman  o 1/24/23: WBC 11 22, Hgb 13, MCV 92, PLT 323K, diff normal  CMP normal    • Diagnostics  12/15/2022 Right Mammogram diagnostic bilateral w 3d & cad: There is an ill-defined approximately 5 cm focal asymmetry seen in the inner central region of the right breast in the anterior depth  The asymmetry correlates with the palpable mass reported by the patient  US breast right limited (diagnostic): Within the area of palpable concern, there are multiple ill-defined hypoechoic masses    Biopsy of the 17 mm x 13 mm x 23 mm irregularly shaped, hypoechoic mass with indistinct margins in the right breast at 2 o'clock, 6 cm from the nipple and 20 mm x 7 mm x 28 mm irregularly shaped, hypoechoic mass with indistinct margins seen in the right breast at 4 o'clock, 6 cm from the nipple are recommended  US breast right limited (diagnostic): A lymph node with focally thickened cortex is noted in the right axilla  12/29/2022 CT CAP w/c: Multiple small lung nodules  These may be inflammatory though a three-month follow-up is recommended to completely exclude metastasis  Unremarkable abdomen and pelvis without evidence of metastatic disease  Minimal paraseptal emphysema at the apices  1/4/2023 Nuc Bone scan: No scintigraphic evidence of osseous metastasis  1/9/2023: TTE LVEF 60%      We discussed possibility of infertility on chemo on 1/4/2023 but she has deferred fertility referral as she is not interested in having more children  Discussion of decision making    I personally reviewed the following lab results, the image studies, pathology, other specialty/physicians consult notes and recommendations, and outside medical records from [facility]  I had a lengthy discussion with the patient and shared the work-up findings  We discussed the diagnosis and management plan as below  I spent 30 minutes reviewing the records (labs, clinician notes, outside records, medical history, ordering medicine/tests/procedures, interpreting the imaging/labs previously done) and coordination of care as well as direct time with the patient today, of which greater than 50% of the time was spent in counseling and coordination of care with the patient/family  · Plan/Labs  · Plan --> Neoadjuvant Pembro-Carbo-taxol for 4 cycles then 4 cycles of Pembro + adriamycin/cytoxan followed by surgery  After surgery, plan is for maintenance Pembro for 9 cycles  Labs preceding chemo/immunotherapy to be completed with CBC, CMP, TSH  C4D15 with Carbo coming up  Afterwards, AC to begin 4/7/23  · Patient has grade 1/2 acneiform rash that started 3/18 on posterior head, traveled to lower neck area  Also, has eczematous rash to right first, second digits  Suspect chemotherapy related from docetaxel    Will start triamcinolone 0 1%, over-the-counter Claritin 10 mg once daily  · Patient had oral thrush earlier this week which significantly improved  However, she does have some throat pain that still is present  I informed her that she should swallow nystatin that was prescribed to her  She will try this and let us know if this does not improve next week  We did also prescribe her Magic mouthwash if needed  · Patient also has pruritus to bilateral, inferior breast area  There is some erythema as well  She is given nystatin powder to area as well  · Zofran 8mg q8 prn nausea/vomiting previously sent home  · F/u Surg Onc for eventual goal of surgery  · F/u genetics counselor testing    Follow Up: q3 weeks for 8 total visits alternating between MD & MARLEE scheduled thru 9/21/2023  Next follow up 4/13/23  All questions were answered to the patient's satisfaction during this encounter  The patient knows the contact information for our office and knows to reach out for any relevant concerns related to this encounter  They are to call for any temperature 100 4 or higher, new symptoms including but not restricted to shaking chills, decreased appetite, nausea, vomiting, diarrhea, increased fatigue, shortness of breath or chest pain, confusion, and not feeling the strength to come to the clinic  For all other listed problems and medical diagnosis in their chart - they are managed by PCP and/or other specialists, which the patient acknowledges  Thank you very much for your consultation and making us a part of this patient's care  We are continuing to follow closely with you  Please do not hesitate to reach out to me with any additional questions or concerns  Neeraj Isidro PA-C   Hematology & Medical Oncology Staff         Disclaimer: This document was prepared using [employer] Fluency Direct technology   If a word or phrase is confusing, or does not make sense, this is likely due to recognition error which was not discovered during this clinician's review  If you believe an error has occurred, please contact me through [address] [place] line for regina? cation  ONCOLOGY HISTORY OF PRESENT ILLNESS        Oncology History   Malignant neoplasm of overlapping sites of right female breast ([facility] ) (Resolved)   12/29/2022 Initial Diagnosis    Malignant neoplasm of overlapping sites of right female breast [facility])     Carcinoma of right breast metastatic to axillary lymph node [facility] )   12/22/2022 -  Cancer Staged    Staging form: Breast, AJCC 8th Edition  - Clinical stage from 12/22/2022: Stage IIIB (cT3, cN1, cM0, G2, ER-, NV-, HER2-) - Signed by Julito Silva MD on 1/4/2023  Stage prefix: Initial diagnosis  Nuclear grade: G3  Mitotic count score: Score 1  Histologic grading system: 3 grade system  Menopausal status: Premenopausal       12/22/2022 Initial Diagnosis    Triple negative breast [facility] ([facility] )     12/22/2022 Biopsy    Right breast ultrasound guided biopsy  A  2 o'clock, 6cm from nipple  Invasive mammary carcinoma of no special type (ductal NST/invasive ductal carcinoma)   Grade 2  ER 0  NV 0   HER2 0  Ki-67 50%    B  4 o'clock, 6cm from nipple  Invasive mammary carcinoma of no special type (ductal NST/invasive ductal carcinoma)   Grade 2   ER 0   NV 0   HER2 2+   FISH negative  Ki-67 40%     C  Right Axillary lymph node  Metastatic carcinoma consistent with mammary primary     12/29/2022 Initial Diagnosis    Carcinoma of right breast metastatic to axillary lymph node ([facility] )     12/30/2022 Genetic Testing    [facility]  A total of 36 genes were evaluated, including: FRANKO, BRCA1, BRCA2, CDH1, CHEK2, PALB2, PTEN, STK11, TP53  Negative result   No pathogenic sequence variants or deletions/duplications identified       1/13/2023 -  Chemotherapy    DOXOrubicin (ADRIAMYCIN), 60 mg/m2 = 113 mg, Intravenous, Once, 0 of 4 cycles  cyclophosphamide (CYTOXAN) IVPB, 600 mg/m2 = 1,134 mg, Intravenous, Once, 0 of 4 cycles  CARBOplatin (PARAPLATIN) IVPB (GOG AUC DOSING), 201 9 mg (83 1 % of original dose 243 mg), Intravenous, Once, 4 of 4 cycles  Dose modification:   (original dose 243 mg, Cycle 1, Reason: Other (Must fill in a comment)),   (original dose 243 mg, Cycle 1)  Administration: 201 9 mg (1/13/2023), 206 7 mg (1/20/2023), 175 2 mg (2/3/2023), 206 7 mg (1/27/2023), 206 7 mg (2/10/2023), 206 7 mg (2/24/2023), 206 7 mg (2/17/2023), 206 7 mg (3/3/2023), 206 7 mg (3/17/2023), 206 7 mg (3/10/2023), 206 7 mg (3/24/2023)  DOCEtaxel (TAXOTERE) chemo infusion, 75 mg/m2 = 141 8 mg (100 % of original dose 75 mg/m2), Intravenous, Once, 1 of 1 cycle  Dose modification: 75 mg/m2 (original dose 75 mg/m2, Cycle 4)  Administration: 141 8 mg (3/17/2023)  PACLItaxel (TAXOL) chemo IVPB, 80 mg/m2 = 151 2 mg, Intravenous, Once, 3 of 3 cycles  Administration: 151 2 mg (1/13/2023), 151 2 mg (1/20/2023), 151 2 mg (2/3/2023), 151 2 mg (1/27/2023), 151 2 mg (2/10/2023), 151 2 mg (2/24/2023), 151 2 mg (2/17/2023), 151 2 mg (3/3/2023), 151 2 mg (3/10/2023)  aprepitant (CINVANTI) in  mL IVPB, 130 mg, Intravenous, Once, 0 of 4 cycles  pembrolizumab (KEYTRUDA) IVPB, 200 mg, Intravenous, Once, 4 of 17 cycles  Administration: 200 mg (1/13/2023), 200 mg (2/3/2023), 200 mg (2/24/2023), 200 mg (3/17/2023)     [facility]   12/22/2022 Initial Diagnosis    Triple negative breast cancer ([facility] )     12/22/2022 Biopsy    Right breast ultrasound guided biopsy  A  2 o'clock, 6cm from nipple  Invasive mammary carcinoma of no special type (ductal NST/invasive ductal carcinoma)   Grade 2  ER 0  IN 0   HER2 0  Ki-67 50%    B  4 o'clock, 6cm from nipple  Invasive mammary carcinoma of no special type (ductal NST/invasive ductal carcinoma)   Grade 2   ER 0   IN 0   HER2 2+   FISH negative  Ki-67 40%     C   Right Axillary lymph node  Metastatic carcinoma consistent with mammary primary     12/30/2022 Genetic Testing    [facility]  A total of 36 genes were evaluated, including: FRANKO, BRCA1, BRCA2, CDH1, CHEK2, PALB2, PTEN, STK11, TP53  Negative result  No pathogenic sequence variants or deletions/duplications identified             SUBJECTIVE  (INTERVAL HISTORY)        Interval History 3/24/23:  Patient has a rash to posterior head, posterior neck, right hand at first and second digits  Rash is itchy  She states her oral thrush is better  However, she does still have some throat pain  She is not swallowing her nystatin  Just swish and spit  Otherwise, no other specific complaints in today's visit  She is having intermittent HA  No new vision changes, CP, SOB, rashes, Abd pain, N/V/D  No bleeding anywhere  She takes Oxycodone from palliative care for diffuse pain  I have reviewed the relevant past medical, surgical, social and family history  I have also reviewed allergies and medications for this patient  Review of Systems  Review of Systems   Constitutional: Negative for chills, decreased appetite, diaphoresis, fever, malaise/fatigue, night sweats, weight gain and weight loss  HENT: Negative for nosebleeds  No white patches to oral cavity  +alopecia  Eyes: Negative for blurred vision, double vision, vision loss in left eye and vision loss in right eye  Cardiovascular: Negative for chest pain, cyanosis, dyspnea on exertion, irregular heartbeat, leg swelling and palpitations  Respiratory: Negative for cough, hemoptysis, shortness of breath, sleep disturbances due to breathing, snoring, sputum production and wheezing  Endocrine: Negative for cold intolerance  Hematologic/Lymphatic: Negative for adenopathy and bleeding problem  Does not bruise/bleed easily  Skin: Negative for color change, dry skin, flushing, nail changes, poor wound healing,  + acneiform rash to posterior head, posterior neck  Eczematous, erythematous rash to R hand -1st and 2nd digits  Musculoskeletal: Negative for arthritis, back pain, joint pain, muscle cramps and muscle weakness     Genitourinary: Negative for bladder incontinence, frequency, hematuria, hesitancy, incomplete emptying, menorrhagia, pelvic pain and urgency  Neurological: Negative for difficulty with concentration, excessive daytime sleepiness, dizziness, light-headedness, loss of balance and weakness  Psychiatric/Behavioral: Negative for altered mental status  The patient is nervous/anxious  A 10-point review of system was performed, pertinent positive and negative were detailed as above  Otherwise, the 10-point review of system was negative        Past Medical History:   Diagnosis Date   • Asthma    • Hypertension        Past Surgical History:   Procedure Laterality Date   •  SECTION     • IR PORT PLACEMENT  2023   • MRI BREAST BIOPSY LEFT (ALL INCLUSIVE) Left 2023   • TUBAL LIGATION     • US GUIDANCE BREAST BIOPSY RIGHT EACH ADDITIONAL Right 2022   • US GUIDED BREAST BIOPSY RIGHT COMPLETE Right 2022   • US GUIDED BREAST LYMPH NODE BIOPSY RIGHT Right 2022       Family History   Problem Relation Age of Onset   • Lung cancer Mother 61   • Ovarian cancer Mother    • No Known Problems Daughter    • No Known Problems Daughter    • Skin cancer Maternal Grandmother    • Ovarian cancer Maternal Grandmother    • Prostate cancer Maternal Grandfather    • Breast cancer Maternal Aunt    • Ovarian cancer Maternal Aunt    • Breast cancer Paternal Aunt        Social History     Socioeconomic History   • Marital status: Single     Spouse name: Not on file   • Number of children: Not on file   • Years of education: Not on file   • Highest education level: Not on file   Occupational History   • Not on file   Tobacco Use   • Smoking status: Every Day     Packs/day: 0 50     Years: 15 00     Pack years: 7 50     Types: Cigarettes   • Smokeless tobacco: Never   Vaping Use   • Vaping Use: Never used   Substance and Sexual Activity   • Alcohol use: Not Currently   • Drug use: Never   • Sexual activity: Yes     Partners: Male Birth control/protection: Female Sterilization   Other Topics Concern   • Not on file   Social History Narrative   • Not on file     Social Determinants of Health     Financial Resource Strain: Not on file   Food Insecurity: Not on file   Transportation Needs: Not on file   Physical Activity: Not on file   Stress: Not on file   Social Connections: Not on file   Intimate Partner Violence: Not on file   Housing Stability: Not on file       No Known Allergies    Current Outpatient Medications   Medication Sig Dispense Refill   • nystatin (MYCOSTATIN) powder Apply topically 4 (four) times a day for 14 days 15 g 0   • triamcinolone (KENALOG) 0 1 % cream Apply topically 2 (two) times a day for 14 days 30 g 0   • al mag oxide-diphenhydramine-lidocaine viscous (MAGIC MOUTHWASH) 1:1:1 suspension Swish and spit 10 mL every 4 (four) hours as needed for mouth pain or discomfort 90 mL 0   • albuterol (PROVENTIL HFA,VENTOLIN HFA) 90 mcg/act inhaler inhale 2 puffs by mouth and INTO THE LUNGS every 6 hours if needed for wheezing 18 g 11   • amLODIPine (NORVASC) 5 mg tablet Take 2 tablets (10 mg total) by mouth daily 60 tablet 2   • CRANIAL PROSTHESIS, RX, Apply to area of concern for alopecia as needed  1 each 0   • fexofenadine (ALLEGRA) 180 MG tablet Take 1 tablet (180 mg total) by mouth daily 30 tablet 5   • hydrocortisone 2 5 % cream Apply topically 4 (four) times a day as needed for irritation or rash 30 g 0   • lidocaine-prilocaine (EMLA) cream Apply topically as needed for mild pain 30 g 0   • LORazepam (Ativan) 0 5 mg tablet Take 0 5 tablets (0 25 mg total) by mouth 2 (two) times a day 14 tablet 0   • melatonin 3 mg Take 1 tablet (3 mg total) by mouth daily at bedtime 30 tablet 5   • montelukast (SINGULAIR) 10 mg tablet take 1 tablet by mouth at bedtime (Patient not taking: Reported on 2/14/2023) 30 tablet 5   • naloxone (NARCAN) 4 mg/0 1 mL nasal spray Administer 1 spray into a nostril   If no response after 2-3 minutes, give another dose in the other nostril using a new spray  1 each 1   • nystatin (MYCOSTATIN) 500,000 units/5 mL suspension Apply 5 mL (500,000 Units total) to the mouth or throat 4 (four) times a day for 7 days 140 mL 0   • ondansetron (ZOFRAN-ODT) 8 mg disintegrating tablet Take 1 tablet (8 mg total) by mouth every 8 (eight) hours as needed for nausea or vomiting (Patient not taking: Reported on 2/14/2023) 20 tablet 0   • oxyCODONE (ROXICODONE) 5 immediate release tablet Take 1 tablet (5 mg total) by mouth every 6 (six) hours as needed for moderate pain Max Daily Amount: 20 mg 30 tablet 0   • senna-docusate sodium (SENOKOT-S) 8 6-50 mg per tablet Take 1 tablet by mouth daily 30 tablet 5   • Symbicort 160-4 5 MCG/ACT inhaler inhale 2 puffs by mouth twice a day Rinse mouth after use 10 2 g 5     No current facility-administered medications for this visit  Facility-Administered Medications Ordered in Other Visits   Medication Dose Route Frequency Provider Last Rate Last Admin   • alteplase (CATHFLO) injection 2 mg  2 mg Intracatheter Q2H PRN Rocael Tovar MD           (Not in a hospital admission)      Objective:     24 Hour Vitals Assessment:     Vitals:    03/24/23 1052   BP: 112/84   Pulse: (!) 113   Resp: 18   Temp: 98 9 °F (37 2 °C)       PHYSICIAN EXAM:    General: Appearance: alert, cooperative, no distress  HEENT: Normocephalic, atraumatic  No scleral icterus  conjunctivae clear  EOMI  Chest: No tenderness to palpation  No open wound noted  Port in place  Lungs: Clear to auscultation bilaterally, Respirations unlabored  Cardiac: Tachycardia, +S1and S2  Abdomen: Soft, non-tender, non-distended  Bowel sounds are normal   Breast: L breast unremarkable, R breast with 5x4 cm mass over the R [place]-quadrant of the breast, no inverted nipple at the last visit*  Extremities:  No edema, cyanosis, clubbing  Skin: Skin color, turgor are normal  No rashes    Lymphatics: no palpable supra-cervical, axillary, or inguinal adenopathy  Neurologic: Awake, Alert, and oriented, no gross focal deficits noted b/l  DATA REVIEW:    Pathology Result:    Final Diagnosis   Date Value Ref Range Status   01/20/2023   Final    A  Left breast, 3:00 (MRI-guided core biopsy, 12 passes):     - Benign breast tissue with fibrocystic change  - Negative for atypia and malignancy in sampled material     Comment:  Clinical / radiologic correlation is recommended to ensure adequate sampling of the targeted lesion  12/22/2022   Final    A  Ultrasound biopsy right breast 2:00, 6 cm from nipple, 4 passes 14-gauge marquee:      - Invasive mammary carcinoma of no special type (ductal NST/invasive ductal carcinoma), see note      - [place] grade 2 of 3 (total score: 7 of 9)          - Tubule formation: <10%, score 3          - Nuclear grade 3 of 3, score 3          - Mitoses < 3/mm2, (</= 7 mitoses/10HPF), score 1      - Invasive carcinoma involves 4 of 4 submitted core biopsies, max  dimension = 16 millimeters      - Ductal carcinoma in situ (DCIS): Not identified      - Lymphovascular invasion: Not identified      - Microcalcifications: Absent      - See synoptic report A    B  Right breast ultrasound biopsy at 4:00, 6 cm from nipple, 4 passes, 14-gauge marquee:      - Invasive mammary carcinoma of no special type (ductal NST/invasive ductal carcinoma) with apocrine features, see note      - [place] grade 2 of 3 (total score: 6 of 9)          - Tubule formation: 10% to 75%, score 2          - Nuclear grade 3 of 3, score 3          - Mitoses < 3/mm2, (</= 7 mitoses/10HPF), score 1      - Invasive carcinoma involves 4 of 4 submitted core biopsies, max  dimension = 15 millimeters      - Ductal carcinoma in situ (DCIS): Not identified      - Lymphovascular invasion: Not identified      - Microcalcifications: Absent      - See synoptic report B    C   Ultrasound right breast biopsy and axillary lymph node, 4 passes 14-gauge marquee: - Metastatic carcinoma, consistent with mammary primary, see note           Image Results:   Image result are reviewed and documented in Hematology/Oncology history    MRI breast biopsy left (all inclusive)  Addendum: ADDENDUM:     PATHOLOGY RESULTS:    D) Non-mass Enhancement (Left; 3 o'clock; Middle; 2 5 cm from nipple)   Benign finding: the pathology findings indicate benign breast tissue and  fibrocystic change  This is concordant with imaging  Surgical consultation for biopsy confirmed malignancy in the right breast  recommended  RECOMMENDATION:        - Return to Routine Screening for the left breast         - Surgical Consultation for the right breast      END ADDENDUM  Narrative: DIAGNOSIS: Triple negative breast cancer ([facility] )    TECHNIQUE:  MRI of the breast was performed in axial and sagittal planes utilizing a   combination of sagittal dynamic T1 pre- and post contrast imaging with fat   suppression in addition to axial post contrast fat saturated T1  Intravenous contrast was administered at 2cc/sec, without documented   contrast reaction  Prior to the procedure, previous imaging was reviewed  The procedure was   explained to the patient in detail  All questions were answered  Written   and verbal informed consent was obtained  MEDICATIONS ADMINISTERED:        INDICATION: Sophie Martinez is a 42 y o  female presenting for clumped   non-mass enhancement 3:00 left breast     FINDINGS:   LEFT  D) NON-MASS ENHANCEMENT: Images of the left breast non-mass enhancement at   3 o'clock in the middle portion, 2 5 cm from the nipple were obtained  The   patient was placed prone on the biopsy table and a timeout was performed  A core needle biopsy was performed under MRI guidance using a lateral   approach  The skin was prepped in the usual fashion  Anesthesia was   administered using 5 mL of lidocaine 1% and 10 mL of lidocaine 1% with   epinephrine  A 12 G device was advanced     A clip was inserted into the target area  A stoplight biopsy clip marker   is placed in the target area  Post-placement MR imaging demonstrates the   clip was at the site  The patient tolerated the procedure well  There were   no immediate complications  Impression:    MRI guided biopsy for clumped non-mass enhancement 3:00 left breast     RECOMMENDATION:       - Waiting for pathology for the left breast      Workstation ID: [id number]      LABS:  Lab data are reviewed and documented in HemOnc history         Lab Results   Component Value Date    HGB 12 4 03/21/2023    HCT 36 1 03/21/2023    MCV 94 03/21/2023     03/21/2023    WBC 3 23 (L) 03/21/2023    NRBC 0 03/21/2023    ATYLMPCT 1 (H) 03/07/2023     Lab Results   Component Value Date    K 4 1 03/21/2023     03/21/2023    CO2 24 03/21/2023    BUN 11 03/21/2023    CREATININE 0 55 (L) 03/21/2023    GLUF 109 (H) 03/21/2023    CALCIUM 9 5 03/21/2023    CORRECTEDCA 9 3 01/17/2023    AST 16 03/21/2023    ALT 21 03/21/2023    ALKPHOS 70 03/21/2023    EGFR 118 03/21/2023       No results found for: IRON, TIBC, FERRITIN    No results found for: [id number]    Recent Labs     03/21/23  1208   WBC 3 23*       By:  Edna Murrieta PA-C, 3/24/2023, 11:37 AM

## 2023-03-24 NOTE — TELEPHONE ENCOUNTER
Spoke to [place], she is currently in infusion therapy  She states thrush is improving, but rash is spreading, itching and burning  Oncology is in building, nurse and [place] will ask Oncology to visualize rash, if unavailable, recommend Urgent Care vs ED evaluation as no improvement in rash

## 2023-03-24 NOTE — TELEPHONE ENCOUNTER
[place] calling she stated she has a rash all over and she has thrush in her mouth  She spoke to her oncologist who in   turn told her to call palliative care  She is on hydrocortisone cream 2 5  and she has the swish and swallow, magic mouth wash and its not getting any better

## 2023-03-24 NOTE — PROGRESS NOTES
Pt here for chemotherapy, carboplatin only today  Pt had taxotere last week and broke out in a rash all over her body and also has thrush in her mouth and throat  She was ordered hydrocortisone cream and nystatin  States it helps a little but she is still miserable  Office stated that she should get all premeds ordered and hopefully that may help her a little  She is going downstairs after treatment to see Antonio Kim re: listed ailments described above  Left port accessed with positive blood return noted throughout treatment  Tolerated infusion without incident  Port flushed and de-accessed  AVS declined    Walked out in stable condition

## 2023-03-28 ENCOUNTER — SOCIAL WORK (OUTPATIENT)
Dept: PALLIATIVE MEDICINE | Facility: CLINIC | Age: 38
End: 2023-03-28

## 2023-03-28 ENCOUNTER — TELEPHONE (OUTPATIENT)
Dept: HEMATOLOGY ONCOLOGY | Facility: CLINIC | Age: 38
End: 2023-03-28

## 2023-03-28 ENCOUNTER — PATIENT MESSAGE (OUTPATIENT)
Dept: HEMATOLOGY ONCOLOGY | Facility: CLINIC | Age: 38
End: 2023-03-28

## 2023-03-28 ENCOUNTER — HOSPITAL ENCOUNTER (OUTPATIENT)
Dept: INFUSION CENTER | Facility: CLINIC | Age: 38
Discharge: HOME | End: 2023-03-28

## 2023-03-28 ENCOUNTER — TELEMEDICINE (OUTPATIENT)
Dept: PALLIATIVE MEDICINE | Facility: CLINIC | Age: 38
End: 2023-03-28

## 2023-03-28 DIAGNOSIS — C77.3 CARCINOMA OF RIGHT BREAST METASTATIC TO AXILLARY LYMPH NODE (HCC): Primary | ICD-10-CM

## 2023-03-28 DIAGNOSIS — K59.00 CONSTIPATION: ICD-10-CM

## 2023-03-28 DIAGNOSIS — R11.0 NAUSEA: ICD-10-CM

## 2023-03-28 DIAGNOSIS — Z71.89 COUNSELING AND COORDINATION OF CARE: Primary | ICD-10-CM

## 2023-03-28 DIAGNOSIS — C50.911 CARCINOMA OF RIGHT BREAST METASTATIC TO AXILLARY LYMPH NODE (HCC): Primary | ICD-10-CM

## 2023-03-28 DIAGNOSIS — K59.00 CONSTIPATION, UNSPECIFIED CONSTIPATION TYPE: ICD-10-CM

## 2023-03-28 DIAGNOSIS — G89.3 CANCER RELATED PAIN: ICD-10-CM

## 2023-03-28 DIAGNOSIS — Z95.828 PORT-A-CATH IN PLACE: ICD-10-CM

## 2023-03-28 DIAGNOSIS — Z51.5 PALLIATIVE CARE PATIENT: ICD-10-CM

## 2023-03-28 DIAGNOSIS — I10 PRIMARY HYPERTENSION: ICD-10-CM

## 2023-03-28 LAB
ALBUMIN SERPL BCP-MCNC: 4.3 G/DL (ref 3.5–5)
ALP SERPL-CCNC: 70 U/L (ref 34–104)
ALT SERPL W P-5'-P-CCNC: 18 U/L (ref 7–52)
ANION GAP SERPL CALCULATED.3IONS-SCNC: 8 MMOL/L (ref 4–13)
AST SERPL W P-5'-P-CCNC: 16 U/L (ref 13–39)
BASOPHILS # BLD AUTO: 0.02 THOUSANDS/ÂΜL (ref 0–0.1)
BASOPHILS NFR BLD AUTO: 1 % (ref 0–1)
BILIRUB SERPL-MCNC: 0.45 MG/DL (ref 0.2–1)
BUN SERPL-MCNC: 11 MG/DL (ref 5–25)
CALCIUM SERPL-MCNC: 9.1 MG/DL (ref 8.4–10.2)
CHLORIDE SERPL-SCNC: 103 MMOL/L (ref 96–108)
CO2 SERPL-SCNC: 25 MMOL/L (ref 21–32)
CREAT SERPL-MCNC: 0.62 MG/DL (ref 0.6–1.3)
EOSINOPHIL # BLD AUTO: 0.02 THOUSAND/ÂΜL (ref 0–0.61)
EOSINOPHIL NFR BLD AUTO: 1 % (ref 0–6)
ERYTHROCYTE [DISTWIDTH] IN BLOOD BY AUTOMATED COUNT: 15.1 % (ref 11.6–15.1)
GFR SERPL CREATININE-BSD FRML MDRD: 113 ML/MIN/1.73SQ M
GLUCOSE SERPL-MCNC: 88 MG/DL (ref 65–140)
HCT VFR BLD AUTO: 35.8 % (ref 34.8–46.1)
HGB BLD-MCNC: 12 G/DL (ref 11.5–15.4)
IMM GRANULOCYTES # BLD AUTO: 0.02 THOUSAND/UL (ref 0–0.2)
IMM GRANULOCYTES NFR BLD AUTO: 1 % (ref 0–2)
LYMPHOCYTES # BLD AUTO: 2.3 THOUSANDS/ÂΜL (ref 0.6–4.47)
LYMPHOCYTES NFR BLD AUTO: 68 % (ref 14–44)
MCH RBC QN AUTO: 32.5 PG (ref 26.8–34.3)
MCHC RBC AUTO-ENTMCNC: 33.5 G/DL (ref 31.4–37.4)
MCV RBC AUTO: 97 FL (ref 82–98)
MONOCYTES # BLD AUTO: 0.45 THOUSAND/ÂΜL (ref 0.17–1.22)
MONOCYTES NFR BLD AUTO: 14 % (ref 4–12)
NEUTROPHILS # BLD AUTO: 0.48 THOUSANDS/ÂΜL (ref 1.85–7.62)
NEUTS SEG NFR BLD AUTO: 15 % (ref 43–75)
NRBC BLD AUTO-RTO: 1 /100 WBCS
PLATELET # BLD AUTO: 207 THOUSANDS/UL (ref 149–390)
PMV BLD AUTO: 9.5 FL (ref 8.9–12.7)
POTASSIUM SERPL-SCNC: 3.8 MMOL/L (ref 3.5–5.3)
PROT SERPL-MCNC: 6.9 G/DL (ref 6.4–8.4)
RBC # BLD AUTO: 3.69 MILLION/UL (ref 3.81–5.12)
SODIUM SERPL-SCNC: 136 MMOL/L (ref 135–147)
WBC # BLD AUTO: 3.29 THOUSAND/UL (ref 4.31–10.16)

## 2023-03-28 RX ORDER — OXYCODONE HYDROCHLORIDE 5 MG/1
5 TABLET ORAL EVERY 6 HOURS PRN
Qty: 30 TABLET | Refills: 0 | Status: SHIPPED | OUTPATIENT
Start: 2023-03-28

## 2023-03-28 RX ORDER — AMLODIPINE BESYLATE 5 MG/1
TABLET ORAL
Qty: 60 TABLET | Refills: 2 | Status: SHIPPED | OUTPATIENT
Start: 2023-03-28

## 2023-03-28 RX ORDER — SENNA AND DOCUSATE SODIUM 50; 8.6 MG/1; MG/1
1 TABLET, FILM COATED ORAL DAILY
Qty: 30 TABLET | Refills: 5 | Status: SHIPPED | OUTPATIENT
Start: 2023-03-28

## 2023-03-28 NOTE — PROGRESS NOTES
Palliative Supportive Care  met with patient to continue to provide emotional support and guidance  Updated biopsychosocial information relevant to support: Patient reported that her daughter and young grandchildren (3 and soon to be 1) are visiting for the week  Patient reported that she used to watch the kids a weekend a month but she doesn't feel she is able to do so currently, increased lethargy, pain, nausea  Patient reported that she believes that 1 of her chemo treatments are causing these symptoms  Reported that she recently had thrush in her mouth and down her throat which is starting to improve  Patient reported that she has rashes on her body which oncology is treating for  Patient was encouraged by CRNP to reach out to oncology to address  Patient reported that she tries to take things day by day  Patient reported that she is nervous about her treatments changing next week and if her body will have adverse side effects from the new treatment  Validation provided     Patient was encouraged to call with any questions/concerns or for support if needed  Identified areas of need include: ongoing support as requested by patient/PSC team  Resources provided: none  Areas that need future follow-up include: ongoing support     I have spent 25 minutes with Patient  today in which greater than 50% of this time was spent in counseling/coordination of care regarding Patient and family education, Counseling / Coordination of care and Documenting in the medical record      Palliative  will follow-up as requested by patient, family, and primary team   Please contact with any specific requests

## 2023-03-28 NOTE — PROGRESS NOTES
Reviewed with Dr Darlina Epley patient [address] below parameters, patient treatment to be deferred one week due to labs out of parameters  Infusion feliciano notified  Patient notified via My chart on 3/28/23

## 2023-03-28 NOTE — TELEPHONE ENCOUNTER
Call out to patient, reviewed s/s patient was experiencing into detail in addition to patient treatment to be deferred due to patient [address]  Reviewed gabapentin side effects and recommendations of taking HS to help ensure patient is safe, not trying to drive  Reviewed with patient that medication may take time to work, reviewed to call in a couple of days to ensure if gabapentin is helping patient with neuropathy/burning feeling in bilateral hands and feet  Patient stated will try at some point to help  Reviewed there are some other medications that might be able to assist with patient neuropathy or Dr Verna Moreno may have to address other alternatives  Patient is a patient of Palliative Care as well, patient had an appointment with them today 3/28/23  Reviewed patient had received docetaxel once and that was the only time patient would receive it in her treatment plan due to the previous reactions to paclitaxel  Patient to receive only Carboplatin only next treatment day and then patient would be advanced to next treatment cycle medication group  Reviewed the three medication in next 4 cycles-keytruda, cytoxan and adriamycin  Reviewed possible side effects and that after those 4 cycles patient would receive keytruda alone  Encouraged patient to utilize the creams that were prescribed to help with the rash on back of neck/back and also to utilize eczema moisturizing cream to assist with wrist/hand area of concern  Encouraged patient to call with any further questions and concerns  Patient verbalized understanding and was appreciative of call  Reviewed and encouraged to call office if patient rash continues/worsens and or if patient experiences any s/s of infection due to low ANC

## 2023-03-28 NOTE — PROGRESS NOTES
Pt presents for port labs, offers no complaints, port accessed, labs drawn, port flushed and de-accessed, AVS declined, aware of next appt, d/c from unit stable

## 2023-03-28 NOTE — TELEPHONE ENCOUNTER
Called patient to review a concern she had about office communication  Patient had questions about gabapentin  Spoke to [place] who will be calling the patient to review this as well as her treatment being deferred

## 2023-03-28 NOTE — TELEPHONE ENCOUNTER
Please defer patient treatment by one week  Please inform patient of new treatment day and time   Thank you

## 2023-03-28 NOTE — PROGRESS NOTES
Outpatient Virtual Regular Visit - Follow-up with Palliative and [address] [place] [address] Lucero Hall 42 y o  female [phone]    Intake:    Reason for virtual visit is patient request       After connecting through [employer], the patient was identified by name and date of birth  Alma Covington or their agent was informed that this was a telemedicine visit and that the visit is being conducted through the [employer]  She agrees to proceed     My office door was closed  No one else was in the room  They acknowledged consent and understanding of privacy and security of the video platform  The patient or agent has agreed to participate and understands they can discontinue the visit at any time  Assessment & Plan  Problem List Items Addressed This Visit        Immune and Lymphatic    Carcinoma of right breast metastatic to axillary lymph node ([address] ) - Primary   Other Visit Diagnoses     Cancer related pain        Nausea        Constipation, unspecified constipation type        Palliative care patient            · Carcinoma of right breast to axillary node- continue to follow with Oncology for management and treatment  · Nausea- ondansetron ODT 8 mg every 8 hours as needed  · Constipation- Senna-S 8 6-50 mg daily  · Cancer related pain- continue Oxycodone 5 mg 1 tablet every 4 hours as needed for pain  · Tylenol 1000 mg three times per day as needed for pain  · Palliative care patient- ongoing symptom management  · Psychosocial supportive listening provided    Medications adjusted this encounter:  Requested Prescriptions      No prescriptions requested or ordered in this encounter     No orders of the defined types were placed in this encounter  There are no discontinued medications  Alma Covington was seen today for symptoms and planning cares related to above illnesses    I have reviewed the patient's controlled substance dispensing history in the Prescription Drug Monitoring Program in compliance with the [facility] regulations before prescribing any controlled substances  They are invited to continue to follow with us  If there are questions or concerns, please contact us through our clinic/answering service 24 hours a day, seven days a week  FLAVIO Bailey  [facility] Palliative [facility] Supportive [facility]          Visit Information    Accompanied By: No one    Source of History: Self    History Limitations: None      Chief Complaint  No chief complaint on file  History of Present Illness      Vivien Momin is a 42 y o  female who presents in follow up of symptoms related to carcinoma of right breast to axillary node  Pertinent issues include: symptom management, nausea, constipation  Patient states she has rash related to chemotherapy, possible Taxol, not improving with current treatment  She will notify Oncology again today and ask to be seen  She reports oral thrush is almost resolved in throat area, has resolved in mouth  Continue Magic Mouthwash and Nystatin swish and swallow  She states pain is currently 6/10 in joints, takes Oxycodone 5 mg 1 tablet twice per day and Tylenol 3,000 mg per day, has not taken any pain medication this morning  Appetite is fair, some days good, admits to occasional nausea in the morning, denies vomiting  Boost nutritional supplements 2 times daily  Denies diarrhea or constipation        Past Medical History:   Diagnosis Date   • Asthma    • Hypertension      Past Surgical History:   Procedure Laterality Date   •  SECTION     • IR PORT PLACEMENT  2023   • MRI BREAST BIOPSY LEFT (ALL INCLUSIVE) Left 2023   • TUBAL LIGATION     • US GUIDANCE BREAST BIOPSY RIGHT EACH ADDITIONAL Right 2022   • US GUIDED BREAST BIOPSY RIGHT COMPLETE Right 2022   • US GUIDED BREAST LYMPH NODE BIOPSY RIGHT Right 2022     Current Outpatient Medications   Medication Sig Dispense Refill   • lesa Bosch oxide-diphenhydramine-lidocaine viscous (MAGIC MOUTHWASH) 1:1:1 suspension Swish and spit 10 mL every 4 (four) hours as needed for mouth pain or discomfort 90 mL 0   • albuterol (PROVENTIL HFA,VENTOLIN HFA) 90 mcg/act inhaler inhale 2 puffs by mouth and INTO THE LUNGS every 6 hours if needed for wheezing 18 g 11   • amLODIPine (NORVASC) 5 mg tablet Take 2 tablets (10 mg total) by mouth daily 60 tablet 2   • CRANIAL PROSTHESIS, RX, Apply to area of concern for alopecia as needed  1 each 0   • fexofenadine (ALLEGRA) 180 MG tablet Take 1 tablet (180 mg total) by mouth daily 30 tablet 5   • hydrocortisone 2 5 % cream Apply topically 4 (four) times a day as needed for irritation or rash 30 g 0   • lidocaine-prilocaine (EMLA) cream Apply topically as needed for mild pain 30 g 0   • LORazepam (Ativan) 0 5 mg tablet Take 0 5 tablets (0 25 mg total) by mouth 2 (two) times a day 14 tablet 0   • melatonin 3 mg Take 1 tablet (3 mg total) by mouth daily at bedtime 30 tablet 5   • montelukast (SINGULAIR) 10 mg tablet take 1 tablet by mouth at bedtime (Patient not taking: Reported on 2/14/2023) 30 tablet 5   • naloxone (NARCAN) 4 mg/0 1 mL nasal spray Administer 1 spray into a nostril  If no response after 2-3 minutes, give another dose in the other nostril using a new spray   1 each 1   • nystatin (MYCOSTATIN) 500,000 units/5 mL suspension Apply 5 mL (500,000 Units total) to the mouth or throat 4 (four) times a day for 7 days 140 mL 0   • nystatin (MYCOSTATIN) powder Apply topically 4 (four) times a day for 14 days 15 g 0   • ondansetron (ZOFRAN-ODT) 8 mg disintegrating tablet Take 1 tablet (8 mg total) by mouth every 8 (eight) hours as needed for nausea or vomiting (Patient not taking: Reported on 2/14/2023) 20 tablet 0   • oxyCODONE (ROXICODONE) 5 immediate release tablet Take 1 tablet (5 mg total) by mouth every 6 (six) hours as needed for moderate pain Max Daily Amount: 20 mg 30 tablet 0   • senna-docusate sodium (SENOKOT-S) 8 6-50 mg per tablet Take 1 tablet by mouth daily 30 tablet 5   • Symbicort 160-4 5 MCG/ACT inhaler inhale 2 puffs by mouth twice a day Rinse mouth after use 10 2 g 5   • triamcinolone (KENALOG) 0 1 % cream Apply topically 2 (two) times a day for 14 days 30 g 0     No current facility-administered medications for this visit  No Known Allergies    Review of Systems   Constitutional: Positive for fatigue  HENT:        Oral thrush   Respiratory: Negative for shortness of breath  Cardiovascular: Negative for chest pain  Gastrointestinal: Positive for constipation and nausea  Negative for diarrhea and vomiting  Skin: Positive for rash  Video Exam  There were no vitals filed for this visit  Physical Exam  Constitutional:       General: She is not in acute distress  Appearance: Normal appearance  She is not ill-appearing  Pulmonary:      Effort: Pulmonary effort is normal    Neurological:      Mental Status: She is alert and oriented to person, place, and time  Psychiatric:         Mood and Affect: Mood normal          I spent 25+ minutes was spent during this virtual visit by Robyn face to face with Reyna English with greater than 50% of the time spent in counseling or coordination of care including discussions of symptom management   All of the patient's or agent's questions were answered during this discussion  Encounter provider FLAVIO Crawford, located at:  [facility]  [address]  [phone]    Recent Visits  No visits were found meeting these conditions  Showing recent visits within past 7 days and meeting all other requirements  Future Appointments  No visits were found meeting these conditions    Showing future appointments within next 150 days and meeting all other requirements       VIRTUAL VISIT [address] [place] [address] or their agent has consented to an online visit or consultation  They understands that the online visit is based solely on information provided by the patient or agent, and that, in the absence of a face-to-face physical evaluation by the physician, the diagnosis they receive is both limited and provisional in terms of accuracy and completeness  This is not intended to replace a full medical face-to-face evaluation by the physician  Makenna Gordillo or their agent understands and accepts these terms  The patient or their agent was informed this is a billable service

## 2023-04-03 ENCOUNTER — TELEPHONE (OUTPATIENT)
Dept: HEMATOLOGY ONCOLOGY | Facility: CLINIC | Age: 38
End: 2023-04-03

## 2023-04-03 DIAGNOSIS — F41.9 ANXIETY: ICD-10-CM

## 2023-04-03 RX ORDER — LORAZEPAM 0.5 MG/1
0.25 TABLET ORAL 2 TIMES DAILY
Qty: 14 TABLET | Refills: 0 | Status: CANCELLED | OUTPATIENT
Start: 2023-04-03

## 2023-04-03 NOTE — TELEPHONE ENCOUNTER
Patient Call Form   Reason for patient call? Patient calling in stating that she needs a new order to reschedule her infusion appointment for Friday  Patient's primary oncologist? Lor Morales    Name of person the patient was calling for? Does the patient issue require a call back? Yes [phone]   If call back required, inform patient that the message will be forwarded to the team and someone will get back to them as soon as possible    Did you relay this information to the patient?  Yes

## 2023-04-03 NOTE — TELEPHONE ENCOUNTER
Returned call to patient  Patient requesting to have treatment this week on Wednesday, provided blood work appropriate for treatment  [facility]  Patient scheduled for Wednesday at 0830  Returned call to patient  Patient aware that appointment rescheduled to 4/5 at 0830  Patient aware to have blood work drawn tomorrow in preparation for treatment on Wednesday  Patient appreciative of call and rescheduling

## 2023-04-04 ENCOUNTER — TELEPHONE (OUTPATIENT)
Dept: PALLIATIVE MEDICINE | Facility: CLINIC | Age: 38
End: 2023-04-04

## 2023-04-04 ENCOUNTER — HOSPITAL ENCOUNTER (OUTPATIENT)
Dept: INFUSION CENTER | Facility: CLINIC | Age: 38
Discharge: HOME | End: 2023-04-04

## 2023-04-04 DIAGNOSIS — F41.9 ANXIETY: ICD-10-CM

## 2023-04-04 DIAGNOSIS — C50.911 CARCINOMA OF RIGHT BREAST METASTATIC TO AXILLARY LYMPH NODE (HCC): Primary | ICD-10-CM

## 2023-04-04 DIAGNOSIS — C77.3 CARCINOMA OF RIGHT BREAST METASTATIC TO AXILLARY LYMPH NODE (HCC): Primary | ICD-10-CM

## 2023-04-04 DIAGNOSIS — Z95.828 PORT-A-CATH IN PLACE: ICD-10-CM

## 2023-04-04 LAB
ALBUMIN SERPL BCP-MCNC: 4.1 G/DL (ref 3.5–5)
ALP SERPL-CCNC: 75 U/L (ref 34–104)
ALT SERPL W P-5'-P-CCNC: 24 U/L (ref 7–52)
ANION GAP SERPL CALCULATED.3IONS-SCNC: 6 MMOL/L (ref 4–13)
AST SERPL W P-5'-P-CCNC: 25 U/L (ref 13–39)
BASOPHILS # BLD AUTO: 0.04 THOUSANDS/ÂΜL (ref 0–0.1)
BASOPHILS NFR BLD AUTO: 1 % (ref 0–1)
BILIRUB SERPL-MCNC: 0.63 MG/DL (ref 0.2–1)
BUN SERPL-MCNC: 10 MG/DL (ref 5–25)
CALCIUM SERPL-MCNC: 9.5 MG/DL (ref 8.4–10.2)
CHLORIDE SERPL-SCNC: 107 MMOL/L (ref 96–108)
CO2 SERPL-SCNC: 25 MMOL/L (ref 21–32)
CREAT SERPL-MCNC: 0.61 MG/DL (ref 0.6–1.3)
EOSINOPHIL # BLD AUTO: 0.02 THOUSAND/ÂΜL (ref 0–0.61)
EOSINOPHIL NFR BLD AUTO: 0 % (ref 0–6)
ERYTHROCYTE [DISTWIDTH] IN BLOOD BY AUTOMATED COUNT: 15.4 % (ref 11.6–15.1)
GFR SERPL CREATININE-BSD FRML MDRD: 114 ML/MIN/1.73SQ M
GLUCOSE SERPL-MCNC: 88 MG/DL (ref 65–140)
HCT VFR BLD AUTO: 37.3 % (ref 34.8–46.1)
HGB BLD-MCNC: 12.6 G/DL (ref 11.5–15.4)
IMM GRANULOCYTES # BLD AUTO: 0.02 THOUSAND/UL (ref 0–0.2)
IMM GRANULOCYTES NFR BLD AUTO: 0 % (ref 0–2)
LYMPHOCYTES # BLD AUTO: 2.05 THOUSANDS/ÂΜL (ref 0.6–4.47)
LYMPHOCYTES NFR BLD AUTO: 37 % (ref 14–44)
MCH RBC QN AUTO: 33.2 PG (ref 26.8–34.3)
MCHC RBC AUTO-ENTMCNC: 33.8 G/DL (ref 31.4–37.4)
MCV RBC AUTO: 98 FL (ref 82–98)
MONOCYTES # BLD AUTO: 0.47 THOUSAND/ÂΜL (ref 0.17–1.22)
MONOCYTES NFR BLD AUTO: 9 % (ref 4–12)
NEUTROPHILS # BLD AUTO: 2.91 THOUSANDS/ÂΜL (ref 1.85–7.62)
NEUTS SEG NFR BLD AUTO: 53 % (ref 43–75)
NRBC BLD AUTO-RTO: 0 /100 WBCS
PLATELET # BLD AUTO: 231 THOUSANDS/UL (ref 149–390)
PMV BLD AUTO: 9.3 FL (ref 8.9–12.7)
POTASSIUM SERPL-SCNC: 3.6 MMOL/L (ref 3.5–5.3)
PROT SERPL-MCNC: 7.1 G/DL (ref 6.4–8.4)
RBC # BLD AUTO: 3.8 MILLION/UL (ref 3.81–5.12)
SODIUM SERPL-SCNC: 138 MMOL/L (ref 135–147)
WBC # BLD AUTO: 5.51 THOUSAND/UL (ref 4.31–10.16)

## 2023-04-04 RX ORDER — LORAZEPAM 0.5 MG/1
0.25 TABLET ORAL 2 TIMES DAILY
Qty: 14 TABLET | Refills: 0 | Status: SHIPPED | OUTPATIENT
Start: 2023-04-04

## 2023-04-04 NOTE — TELEPHONE ENCOUNTER
Primary palliative medicine provider:   Oumou Casillas    Medication requested:  Lorazepam    If for pain, how has the patient been taking their pain medicine?      Next scheduled appointment:  5/23/2023    PDMP review:     [facility]

## 2023-04-04 NOTE — PROGRESS NOTES
Pt presents for central labs offering no complaints  LCW port accessed, flushes freely, but no blood return noted after several attempts of repositioning  Pt does not have time for cathflo today  Labs obtained peripherally  Pt to return tomorrow for tx and possibly cathflo  Pt discharged in stable condition

## 2023-04-05 ENCOUNTER — HOSPITAL ENCOUNTER (OUTPATIENT)
Dept: INFUSION CENTER | Facility: CLINIC | Age: 38
Discharge: HOME | End: 2023-04-05

## 2023-04-05 VITALS
SYSTOLIC BLOOD PRESSURE: 123 MMHG | HEIGHT: 64 IN | DIASTOLIC BLOOD PRESSURE: 72 MMHG | TEMPERATURE: 97.7 F | HEART RATE: 107 BPM | RESPIRATION RATE: 16 BRPM | WEIGHT: 185 LBS | BODY MASS INDEX: 31.58 KG/M2

## 2023-04-05 DIAGNOSIS — C50.911 CARCINOMA OF RIGHT BREAST METASTATIC TO AXILLARY LYMPH NODE (HCC): Primary | ICD-10-CM

## 2023-04-05 DIAGNOSIS — L65.8 ALOPECIA DUE TO CYTOTOXIC DRUG: ICD-10-CM

## 2023-04-05 DIAGNOSIS — C50.919 TRIPLE NEGATIVE BREAST CANCER (HCC): Primary | ICD-10-CM

## 2023-04-05 DIAGNOSIS — C77.3 CARCINOMA OF RIGHT BREAST METASTATIC TO AXILLARY LYMPH NODE (HCC): Primary | ICD-10-CM

## 2023-04-05 DIAGNOSIS — T45.1X5A ALOPECIA DUE TO CYTOTOXIC DRUG: ICD-10-CM

## 2023-04-05 RX ORDER — SODIUM CHLORIDE 9 MG/ML
20 INJECTION, SOLUTION INTRAVENOUS ONCE
Status: COMPLETED | OUTPATIENT
Start: 2023-04-05 | End: 2023-04-05

## 2023-04-05 RX ADMIN — FAMOTIDINE 20 MG: 10 INJECTION, SOLUTION INTRAVENOUS at 10:26

## 2023-04-05 RX ADMIN — SODIUM CHLORIDE 20 ML/HR: 0.9 INJECTION, SOLUTION INTRAVENOUS at 09:39

## 2023-04-05 RX ADMIN — DIPHENHYDRAMINE HYDROCHLORIDE 25 MG: 50 INJECTION, SOLUTION INTRAMUSCULAR; INTRAVENOUS at 10:02

## 2023-04-05 RX ADMIN — DEXAMETHASONE SODIUM PHOSPHATE: 10 INJECTION, SOLUTION INTRAMUSCULAR; INTRAVENOUS at 09:40

## 2023-04-05 RX ADMIN — ALTEPLASE 2 MG: 2.2 INJECTION, POWDER, LYOPHILIZED, FOR SOLUTION INTRAVENOUS at 08:58

## 2023-04-05 RX ADMIN — CARBOPLATIN 206.7 MG: 10 INJECTION, SOLUTION INTRAVENOUS at 10:58

## 2023-04-05 NOTE — PROGRESS NOTES
Pt presents for chemo therapy, reports rash and itchiness to hands, noted very dry skin, reports has been using Eucerin cream and hydrocortisone, María Flowers, RN made aware pt to continue using creams, pt made aware   Pt tolerated treatment AVS declined, aware of next appt, d/c from unit stable

## 2023-04-06 DIAGNOSIS — R21 RASH: ICD-10-CM

## 2023-04-24 ENCOUNTER — TELEPHONE (OUTPATIENT)
Dept: PALLIATIVE MEDICINE | Facility: CLINIC | Age: 38
End: 2023-04-24

## 2023-04-24 ENCOUNTER — TELEPHONE (OUTPATIENT)
Dept: HEMATOLOGY ONCOLOGY | Facility: CLINIC | Age: 38
End: 2023-04-24

## 2023-04-24 DIAGNOSIS — R11.2 CHEMOTHERAPY INDUCED NAUSEA AND VOMITING: Primary | ICD-10-CM

## 2023-04-24 DIAGNOSIS — K21.9 GERD (GASTROESOPHAGEAL REFLUX DISEASE): Primary | ICD-10-CM

## 2023-04-24 DIAGNOSIS — T45.1X5A CHEMOTHERAPY INDUCED NAUSEA AND VOMITING: Primary | ICD-10-CM

## 2023-04-24 RX ORDER — PROCHLORPERAZINE MALEATE 10 MG
10 TABLET ORAL EVERY 6 HOURS PRN
Qty: 30 TABLET | Refills: 0 | Status: SHIPPED | OUTPATIENT
Start: 2023-04-24

## 2023-04-24 RX ORDER — OMEPRAZOLE 20 MG/1
20 CAPSULE, DELAYED RELEASE ORAL DAILY
Qty: 30 CAPSULE | Refills: 1 | Status: SHIPPED | OUTPATIENT
Start: 2023-04-24

## 2023-04-24 NOTE — TELEPHONE ENCOUNTER
Call out to patient, reviewed patient prescription for compazine pending provider review and signature  Isabella Poe PA-C recommending to alternate compazine with zofran and to follow Palliative care recommendations  Patient appreciative of call

## 2023-04-24 NOTE — TELEPHONE ENCOUNTER
Spoke to patient, patient states nausea, heart burn, decreased appetite all worsened with current treatment  Compazine prescribed by Oncology, Pepcid AC, taking ondansetron  Recommend Prilosec in the morning and Pepcid AC in the evening and monitor  Avoid trigger foods  Patient instructed to contact Palliative if symptoms worsen or do no improve, if any questions or concerns

## 2023-04-24 NOTE — TELEPHONE ENCOUNTER
Patient called stating she is not able to eat since starting chemo  She is requesting something to help with appetite       Pharmacy [facility]  Next scheduled appt 5/23/23

## 2023-04-28 ENCOUNTER — TELEPHONE (OUTPATIENT)
Dept: HEMATOLOGY ONCOLOGY | Facility: CLINIC | Age: 38
End: 2023-04-28

## 2023-04-28 ENCOUNTER — HOSPITAL ENCOUNTER (EMERGENCY)
Facility: HOSPITAL | Age: 38
Discharge: HOME | End: 2023-04-28
Attending: EMERGENCY MEDICINE

## 2023-04-28 ENCOUNTER — APPOINTMENT (EMERGENCY)
Dept: RADIOLOGY | Facility: HOSPITAL | Age: 38
End: 2023-04-28

## 2023-04-28 VITALS
RESPIRATION RATE: 17 BRPM | HEART RATE: 109 BPM | TEMPERATURE: 98.3 F | DIASTOLIC BLOOD PRESSURE: 76 MMHG | SYSTOLIC BLOOD PRESSURE: 131 MMHG | OXYGEN SATURATION: 99 %

## 2023-04-28 DIAGNOSIS — C50.911 CARCINOMA OF RIGHT BREAST METASTATIC TO AXILLARY LYMPH NODE (HCC): Primary | ICD-10-CM

## 2023-04-28 DIAGNOSIS — T45.1X5A CHEMOTHERAPY-INDUCED PERIPHERAL NEUROPATHY (HCC): Primary | ICD-10-CM

## 2023-04-28 DIAGNOSIS — T45.1X5A CHEMOTHERAPY INDUCED NEUTROPENIA (HCC): ICD-10-CM

## 2023-04-28 DIAGNOSIS — C77.3 CARCINOMA OF RIGHT BREAST METASTATIC TO AXILLARY LYMPH NODE (HCC): ICD-10-CM

## 2023-04-28 DIAGNOSIS — C50.919 TRIPLE NEGATIVE BREAST CANCER (HCC): ICD-10-CM

## 2023-04-28 DIAGNOSIS — D70.9 NEUTROPENIC FEVER (HCC): Primary | ICD-10-CM

## 2023-04-28 DIAGNOSIS — R50.81 NEUTROPENIC FEVER (HCC): Primary | ICD-10-CM

## 2023-04-28 DIAGNOSIS — D70.1 CHEMOTHERAPY INDUCED NEUTROPENIA (HCC): ICD-10-CM

## 2023-04-28 DIAGNOSIS — C77.3 CARCINOMA OF RIGHT BREAST METASTATIC TO AXILLARY LYMPH NODE (HCC): Primary | ICD-10-CM

## 2023-04-28 DIAGNOSIS — C50.911 CARCINOMA OF RIGHT BREAST METASTATIC TO AXILLARY LYMPH NODE (HCC): ICD-10-CM

## 2023-04-28 DIAGNOSIS — G62.0 CHEMOTHERAPY-INDUCED PERIPHERAL NEUROPATHY (HCC): Primary | ICD-10-CM

## 2023-04-28 DIAGNOSIS — R50.81 NEUTROPENIC FEVER (HCC): ICD-10-CM

## 2023-04-28 DIAGNOSIS — D70.9 NEUTROPENIC FEVER (HCC): ICD-10-CM

## 2023-04-28 LAB
ALBUMIN SERPL BCP-MCNC: 4.3 G/DL (ref 3.5–5)
ALP SERPL-CCNC: 80 U/L (ref 34–104)
ALT SERPL W P-5'-P-CCNC: 20 U/L (ref 7–52)
ANION GAP SERPL CALCULATED.3IONS-SCNC: 6 MMOL/L (ref 4–13)
APTT PPP: 32 SECONDS (ref 23–37)
AST SERPL W P-5'-P-CCNC: 15 U/L (ref 13–39)
BASOPHILS # BLD AUTO: 0.01 THOUSANDS/ΜL (ref 0–0.1)
BASOPHILS NFR BLD AUTO: 1 % (ref 0–1)
BILIRUB SERPL-MCNC: 0.56 MG/DL (ref 0.2–1)
BILIRUB UR QL STRIP: NEGATIVE
BUN SERPL-MCNC: 10 MG/DL (ref 5–25)
CALCIUM SERPL-MCNC: 9.9 MG/DL (ref 8.4–10.2)
CHLORIDE SERPL-SCNC: 104 MMOL/L (ref 96–108)
CLARITY UR: CLEAR
CO2 SERPL-SCNC: 27 MMOL/L (ref 21–32)
COLOR UR: YELLOW
CREAT SERPL-MCNC: 0.69 MG/DL (ref 0.6–1.3)
EOSINOPHIL # BLD AUTO: 0.01 THOUSAND/ΜL (ref 0–0.61)
EOSINOPHIL NFR BLD AUTO: 1 % (ref 0–6)
ERYTHROCYTE [DISTWIDTH] IN BLOOD BY AUTOMATED COUNT: 13.8 % (ref 11.6–15.1)
FLUAV RNA RESP QL NAA+PROBE: NEGATIVE
FLUBV RNA RESP QL NAA+PROBE: NEGATIVE
GFR SERPL CREATININE-BSD FRML MDRD: 110 ML/MIN/1.73SQ M
GLUCOSE SERPL-MCNC: 125 MG/DL (ref 65–140)
GLUCOSE UR STRIP-MCNC: NEGATIVE MG/DL
HCG SERPL QL: NEGATIVE
HCT VFR BLD AUTO: 34.2 % (ref 34.8–46.1)
HGB BLD-MCNC: 11.7 G/DL (ref 11.5–15.4)
HGB UR QL STRIP.AUTO: NEGATIVE
IMM GRANULOCYTES # BLD AUTO: 0.01 THOUSAND/UL (ref 0–0.2)
IMM GRANULOCYTES NFR BLD AUTO: 1 % (ref 0–2)
INR PPP: 1.18 (ref 0.84–1.19)
KETONES UR STRIP-MCNC: NEGATIVE MG/DL
LACTATE SERPL-SCNC: 0.7 MMOL/L (ref 0.5–2)
LEUKOCYTE ESTERASE UR QL STRIP: NEGATIVE
LYMPHOCYTES # BLD AUTO: 0.85 THOUSANDS/ΜL (ref 0.6–4.47)
LYMPHOCYTES NFR BLD AUTO: 51 % (ref 14–44)
MCH RBC QN AUTO: 33.2 PG (ref 26.8–34.3)
MCHC RBC AUTO-ENTMCNC: 34.2 G/DL (ref 31.4–37.4)
MCV RBC AUTO: 97 FL (ref 82–98)
MONOCYTES # BLD AUTO: 0.3 THOUSAND/ΜL (ref 0.17–1.22)
MONOCYTES NFR BLD AUTO: 18 % (ref 4–12)
NEUTROPHILS # BLD AUTO: 0.46 THOUSANDS/ΜL (ref 1.85–7.62)
NEUTS SEG NFR BLD AUTO: 28 % (ref 43–75)
NITRITE UR QL STRIP: NEGATIVE
NRBC BLD AUTO-RTO: 0 /100 WBCS
PH UR STRIP.AUTO: 6.5 [PH]
PLATELET # BLD AUTO: 149 THOUSANDS/UL (ref 149–390)
PMV BLD AUTO: 10.3 FL (ref 8.9–12.7)
POTASSIUM SERPL-SCNC: 3.7 MMOL/L (ref 3.5–5.3)
PROCALCITONIN SERPL-MCNC: 30.84 NG/ML
PROT SERPL-MCNC: 7.4 G/DL (ref 6.4–8.4)
PROT UR STRIP-MCNC: NEGATIVE MG/DL
PROTHROMBIN TIME: 14.8 SECONDS (ref 11.6–14.5)
RBC # BLD AUTO: 3.52 MILLION/UL (ref 3.81–5.12)
RSV RNA RESP QL NAA+PROBE: NEGATIVE
S PYO DNA THROAT QL NAA+PROBE: NOT DETECTED
SARS-COV-2 RNA RESP QL NAA+PROBE: NEGATIVE
SODIUM SERPL-SCNC: 137 MMOL/L (ref 135–147)
SP GR UR STRIP.AUTO: 1.02 (ref 1–1.03)
UROBILINOGEN UR QL STRIP.AUTO: 0.2 E.U./DL
WBC # BLD AUTO: 1.64 THOUSAND/UL (ref 4.31–10.16)

## 2023-04-28 RX ORDER — AZITHROMYCIN 250 MG/1
TABLET, FILM COATED ORAL
Qty: 6 TABLET | Refills: 0 | Status: SHIPPED | OUTPATIENT
Start: 2023-04-28 | End: 2023-05-02

## 2023-04-28 RX ORDER — LEVOFLOXACIN 750 MG/1
750 TABLET ORAL EVERY 24 HOURS
Qty: 10 TABLET | Refills: 0 | Status: SHIPPED | OUTPATIENT
Start: 2023-04-28 | End: 2023-05-08

## 2023-04-28 RX ADMIN — SODIUM CHLORIDE 1000 ML: 0.9 INJECTION, SOLUTION INTRAVENOUS at 09:06

## 2023-04-28 RX ADMIN — VANCOMYCIN HYDROCHLORIDE 1750 MG: 1 INJECTION, POWDER, LYOPHILIZED, FOR SOLUTION INTRAVENOUS at 10:43

## 2023-04-28 RX ADMIN — CEFEPIME 2000 MG: 2 INJECTION, POWDER, FOR SOLUTION INTRAVENOUS at 10:28

## 2023-04-28 NOTE — ED PROVIDER NOTES
Pt Name: Dipesh Lane  MRN: [id number]  [place] 1985  Age/Sex: 42 y o  female  Date of evaluation: 2023  PCP: Chuy Cooley MD    [address] [place] [address]    Chief Complaint   Patient presents with   • Fever - 9 weeks to 74 years     Pt c/o fever/ sore throat that started 2 days ago, pt states she took tylenol at 4am         HPI and MDM    42 y o  female presenting with fever, sore throat  Differential diagnosis considered includes but not limited to ***            Medications   sodium chloride 0 9 % bolus 1,000 mL (has no administration in time range)         Past Medical and Surgical History    Past Medical History:   Diagnosis Date   • Asthma    • Hypertension        Past Surgical History:   Procedure Laterality Date   •  SECTION     • IR PORT PLACEMENT  2023   • MRI BREAST BIOPSY LEFT (ALL INCLUSIVE) Left 2023   • TUBAL LIGATION     • US GUIDANCE BREAST BIOPSY RIGHT EACH ADDITIONAL Right 2022   • US GUIDED BREAST BIOPSY RIGHT COMPLETE Right 2022   • US GUIDED BREAST LYMPH NODE BIOPSY RIGHT Right 2022       Family History   Problem Relation Age of Onset   • Lung cancer Mother 61   • Ovarian cancer Mother    • No Known Problems Daughter    • No Known Problems Daughter    • Skin cancer Maternal Grandmother    • Ovarian cancer Maternal Grandmother    • Prostate cancer Maternal Grandfather    • Breast cancer Maternal Aunt    • Ovarian cancer Maternal Aunt    • Breast cancer Paternal Aunt        Social History     Tobacco Use   • Smoking status: Every Day     Packs/day: 0 50     Years: 15 00     Pack years: 7 50     Types: Cigarettes   • Smokeless tobacco: Never   Vaping Use   • Vaping Use: Never used   Substance Use Topics   • Alcohol use: Not Currently   • Drug use: Never           Allergies    No Known Allergies    Home Medications    Prior to Admission medications    Medication Sig Start Date End Date Taking?  Authorizing Provider   prochlorperazine (COMPAZINE) 10 P o  antibiotics were sent to her pharmacy of choice  Strict return precautions were discussed, also discussed if she is able to come back regardless of worsening symptoms or symptoms getting better, she may do so and it would be preferred for her to just come back  She verbalized understanding  Medications   sodium chloride 0 9 % bolus 1,000 mL (0 mL Intravenous Stopped 23 1202)   cefepime (MAXIPIME) 2 g/50 mL dextrose IVPB (0 mg Intravenous Stopped 23 1202)   vancomycin (VANCOCIN) 1,750 mg in sodium chloride 0 9 % 500 mL IVPB (0 mg Intravenous Stopped 23 1249)         Past Medical and Surgical History    Past Medical History:   Diagnosis Date    Asthma     Hypertension        Past Surgical History:   Procedure Laterality Date     SECTION      IR PORT PLACEMENT  2023    MRI BREAST BIOPSY LEFT (ALL INCLUSIVE) Left 2023    TUBAL LIGATION      US GUIDANCE BREAST BIOPSY RIGHT EACH ADDITIONAL Right 2022    US GUIDED BREAST BIOPSY RIGHT COMPLETE Right 2022    US GUIDED BREAST LYMPH NODE BIOPSY RIGHT Right 2022       Family History   Problem Relation Age of Onset    Lung cancer Mother 61    Ovarian cancer Mother     No Known Problems Daughter     No Known Problems Daughter     Skin cancer Maternal Grandmother     Ovarian cancer Maternal Grandmother     Prostate cancer Maternal Grandfather     Breast cancer Maternal Aunt     Ovarian cancer Maternal Aunt     Breast cancer Paternal Aunt        Social History     Tobacco Use    Smoking status: Every Day     Packs/day: 0 50     Years: 15 00     Pack years: 7 50     Types: Cigarettes    Smokeless tobacco: Never   Vaping Use    Vaping Use: Never used   Substance Use Topics    Alcohol use: Not Currently    Drug use: Never           Allergies    No Known Allergies    Home Medications    Prior to Admission medications    Medication Sig Start Date End Date Taking?  Authorizing Provider release capsule Take 1 capsule (20 mg total) by mouth daily 4/24/23   FLAVIO Douglas   ondansetron (ZOFRAN-ODT) 8 mg disintegrating tablet Take 1 tablet (8 mg total) by mouth every 8 (eight) hours as needed for nausea or vomiting 4/14/23   Moose Ventura PA-C   oxyCODONE (ROXICODONE) 5 immediate release tablet Take 1 tablet (5 mg total) by mouth every 6 (six) hours as needed for moderate pain Max Daily Amount: 20 mg 4/21/23   Matilde Lee PA-C   senna-docusate sodium (SENOKOT-S) 8 6-50 mg per tablet Take 1 tablet by mouth daily 3/28/23   FLAVIO Douglas   Symbicort 160-4 5 MCG/ACT inhaler inhale 2 puffs by mouth twice a day Rinse mouth after use 1/31/23   Dora Hathaway PA-C   triamcinolone (KENALOG) 0 1 % cream Apply topically 2 (two) times a day for 14 days 3/24/23 4/7/23  Moose Ventura PA-C   urea (CARMOL) 10 % cream Apply topically as needed for dry skin 4/13/23   Moose Ventura PA-C           Physical Exam      ED Triage Vitals [04/28/23 0844]   Temperature Pulse Respirations Blood Pressure SpO2   98 3 °F (36 8 °C) (!) 109 17 131/76 99 %      Temp Source Heart Rate Source Patient Position - Orthostatic VS BP Location FiO2 (%)   Oral Monitor Sitting Right arm --      Pain Score       --               Physical Exam         Diagnostic Results      Labs:    Results Reviewed     Procedure Component Value Units Date/Time    hCG, qualitative pregnancy [[id number]]     Lab Status: No result Specimen: Blood     CBC and differential [[id number]]     Lab Status: No result Specimen: Blood     Comprehensive metabolic panel [[id number]]     Lab Status: No result Specimen: Blood     Strep A PCR [[id number]] Collected: 04/28/23 0852    Lab Status: In process Specimen: Throat Updated: 04/28/23 0854    FLU/RSV/COVID - if FLU/RSV clinically relevant [[id number]] Collected: 04/28/23 0852    Lab Status:  In process Specimen: Nares from Nose Updated: 04/28/23 1637          All labs reviewed and utilized in the medical decision making process    Radiology:    XR chest 2 views    (Results Pending)       All radiology studies independently viewed by me and interpreted by the radiologist     Procedure    Procedures        FINAL IMPRESSION    Final diagnoses:   None         DISPOSITION    ED Disposition     None      Follow-up Information    None           PATIENT REFERRED TO:    No follow-up provider specified  DISCHARGE MEDICATIONS:    Patient's Medications   Discharge Prescriptions    No medications on file       No discharge procedures on file  Debra Ireland DO        This note was partially completed using voice recognition technology, and was scanned for gross errors; however some errors may still exist  Please contact the author with any questions or requests for clarification  General: Skin is warm  Findings: No erythema  Neurological:      Mental Status: She is alert and oriented to person, place, and time  Mental status is at baseline  Diagnostic Results      Labs:    Results Reviewed     Procedure Component Value Units Date/Time    Blood culture #1 [[id number]] Collected: 04/28/23 1028    Lab Status: Final result Specimen: Blood from Arm, Left Updated: 05/03/23 1601     Blood Culture No Growth After 5 Days  Blood culture #2 [[id number]] Collected: 04/28/23 1028    Lab Status: Final result Specimen: Blood from Arm, Right Updated: 05/03/23 1601     Blood Culture No Growth After 5 Days  UA w Reflex to Microscopic w Reflex to Culture [[id number]] Collected: 04/28/23 1129    Lab Status: Final result Specimen: Urine, Clean Catch Updated: 04/28/23 1151     Color, UA Yellow     Clarity, UA Clear     Specific Newcomb, UA 1 020     pH, UA 6 5     Leukocytes, UA Negative     Nitrite, UA Negative     Protein, UA Negative mg/dl      Glucose, UA Negative mg/dl      Ketones, UA Negative mg/dl      Urobilinogen, UA 0 2 E U /dl      Bilirubin, UA Negative     Occult Blood, UA Negative    Procalcitonin [[id number]]  (Abnormal) Collected: 04/28/23 1028    Lab Status: Final result Specimen: Blood from Arm, Right Updated: 04/28/23 1103     Procalcitonin 30 84 ng/ml     Protime-INR [[id number]]  (Abnormal) Collected: 04/28/23 1028    Lab Status: Final result Specimen: Blood from Arm, Right Updated: 04/28/23 1059     Protime 14 8 seconds      INR 1 18    APTT [[id number]]  (Normal) Collected: 04/28/23 1028    Lab Status: Final result Specimen: Blood from Arm, Right Updated: 04/28/23 1059     PTT 32 seconds     Lactic acid [[id number]]  (Normal) Collected: 04/28/23 1028    Lab Status: Final result Specimen: Blood from Arm, Right Updated: 04/28/23 1055     LACTIC ACID 0 7 mmol/L     Narrative:      Result may be elevated if tourniquet was used during collection      CBC and differential [[id number]]  (Abnormal) Collected: 04/28/23 0906    Lab Status: Final result Specimen: Blood from Arm, Right Updated: 04/28/23 0945     WBC 1 64 Thousand/uL      RBC 3 52 Million/uL      Hemoglobin 11 7 g/dL      Hematocrit 34 2 %      MCV 97 fL      MCH 33 2 pg      MCHC 34 2 g/dL      RDW 13 8 %      MPV 10 3 fL      Platelets 018 Thousands/uL      nRBC 0 /100 WBCs      Neutrophils Relative 28 %      Immat GRANS % 1 %      Lymphocytes Relative 51 %      Monocytes Relative 18 %      Eosinophils Relative 1 %      Basophils Relative 1 %      Neutrophils Absolute 0 46 Thousands/µL      Immature Grans Absolute 0 01 Thousand/uL      Lymphocytes Absolute 0 85 Thousands/µL      Monocytes Absolute 0 30 Thousand/µL      Eosinophils Absolute 0 01 Thousand/µL      Basophils Absolute 0 01 Thousands/µL     Narrative: This is an appended report  These results have been appended to a previously verified report  hCG, qualitative pregnancy [[id number]]  (Normal) Collected: 04/28/23 0906    Lab Status: Final result Specimen: Blood from Arm, Right Updated: 04/28/23 0939     Preg, Serum Negative    FLU/RSV/COVID - if FLU/RSV clinically relevant [[id number]]  (Normal) Collected: 04/28/23 0852    Lab Status: Final result Specimen: Nares from Nose Updated: 04/28/23 0936     SARS-CoV-2 Negative     INFLUENZA A PCR Negative     INFLUENZA B PCR Negative     RSV PCR Negative    Narrative:      FOR PEDIATRIC PATIENTS - copy/paste COVID Guidelines URL to browser: [email or web address]    SARS-CoV-2 assay is a Nucleic Acid Amplification assay intended for the  qualitative detection of nucleic acid from SARS-CoV-2 in nasopharyngeal  swabs  Results are for the presumptive identification of SARS-CoV-2 RNA  Positive results are indicative of infection with SARS-CoV-2, the virus  causing COVID-19, but do not rule out bacterial infection or co-infection  with other viruses   Laboratories within the [place] and its  territories are required to report all positive results to the appropriate  public health authorities  Negative results do not preclude SARS-CoV-2  infection and should not be used as the sole basis for treatment or other  patient management decisions  Negative results must be combined with  clinical observations, patient history, and epidemiological information  This test has not been FDA cleared or approved  This test has been authorized by FDA under an Emergency Use Authorization  (EUA)  This test is only authorized for the duration of time the  declaration that circumstances exist justifying the authorization of the  emergency use of an in vitro diagnostic tests for detection of SARS-CoV-2  virus and/or diagnosis of COVID-19 infection under section 564(b)(1) of  the Act, 21 U  S C  748GEZ-6(M)(3), unless the authorization is terminated  or revoked sooner  The test has been validated but independent review by FDA  and CLIA is pending  Test performed using [employer] GeneXpert: This RT-PCR assay targets N2,  a region unique to SARS-CoV-2  A conserved region in the E-gene was chosen  for pan-Sarbecovirus detection which includes SARS-CoV-2  According to CMS-2020-01-R, this platform meets the definition of high-throughput technology      Comprehensive metabolic panel [[id number]] Collected: 04/28/23 0906    Lab Status: Final result Specimen: Blood from Arm, Right Updated: 04/28/23 0932     Sodium 137 mmol/L      Potassium 3 7 mmol/L      Chloride 104 mmol/L      CO2 27 mmol/L      ANION GAP 6 mmol/L      BUN 10 mg/dL      Creatinine 0 69 mg/dL      Glucose 125 mg/dL      Calcium 9 9 mg/dL      AST 15 U/L      ALT 20 U/L      Alkaline Phosphatase 80 U/L      Total Protein 7 4 g/dL      Albumin 4 3 g/dL      Total Bilirubin 0 56 mg/dL      eGFR 110 ml/min/1 73sq m     Narrative:      [place] guidelines for Chronic Kidney Disease (CKD):     Stage 1 with normal or high GFR (GFR > 90 mL/min/1 73 square meters)    Stage 2 Mild CKD (GFR = 60-89 mL/min/1 73 square meters)    Stage 3A Moderate CKD (GFR = 45-59 mL/min/1 73 square meters)    Stage 3B Moderate CKD (GFR = 30-44 mL/min/1 73 square meters)    Stage 4 Severe CKD (GFR = 15-29 mL/min/1 73 square meters)    Stage 5 End Stage CKD (GFR <15 mL/min/1 73 square meters)  Note: GFR calculation is accurate only with a steady state creatinine    Strep A PCR [[id number]]  (Normal) Collected: 04/28/23 0852    Lab Status: Final result Specimen: Throat Updated: 04/28/23 0926     STREP A PCR Not Detected          All labs reviewed and utilized in the medical decision making process    Radiology:    XR chest 2 views   Final Result      No acute cardiopulmonary disease  Workstation performed: [id number]             All radiology studies independently viewed by me and interpreted by the radiologist     Procedure    Procedures        FINAL IMPRESSION    Final diagnoses:   Neutropenic fever ([facility] [address] )         DISPOSITION    Time reflects when diagnosis was documented in both MDM as applicable and the Disposition within this note     Time User Action Codes Description Comment    4/28/2023 12:20 PM Richard Milligan Add [D70 9,  R50 81] Neutropenic fever [facility])       ED Disposition     ED Disposition   AMA    Condition   --    Date/Time   Fri Apr 28, 2023 12:21 PM    Comment   Date: 4/28/2023  Patient: Nayeli Parsons  Admitted: 4/28/2023  8:41 AM  Attending Provider: Federico Durham, [address] [place] [address] or her authorized caregiver has made the decision for the patient to leave the emergency department against the advice  of her attending physician  She or her authorized caregiver has been informed and understands the inherent risks, including death, disability, worsening infection  She or her authorized caregiver has decided to accept the responsibility for this de cision   Nayeli Parsons and all necessary parties have been advised that she may return for further evaluation or treatment  Her condition at time of discharge was guarded    Sally Mcburney had current vital signs as follows:  /76 (BP Locatio n: Right arm)   Pulse (!) 109   Temp 98 3 °F (36 8 °C) (Oral)   Resp 17            Follow-up Information     Follow up With Specialties Details Why Chio Hernandez MD Hematology and Oncology, Hematology, Oncology Call today  [address] [facility]  [phone]              PATIENT REFERRED TO:    Harjit Hadley [address]  [address]  [address] [facility]  [phone]    Call today        DISCHARGE MEDICATIONS:    Discharge Medication List as of 4/28/2023 12:23 PM      START taking these medications    Details   azithromycin (ZITHROMAX) 250 mg tablet Take 2 tablets today then 1 tablet daily x 4 days, Normal      levofloxacin (LEVAQUIN) 750 mg tablet Take 1 tablet (750 mg total) by mouth every 24 hours for 10 days, Starting Fri 4/28/2023, Until Mon 5/8/2023, Normal         CONTINUE these medications which have NOT CHANGED    Details   prochlorperazine (COMPAZINE) 10 mg tablet Take 1 tablet (10 mg total) by mouth every 6 (six) hours as needed for nausea or vomiting, Starting Mon 4/24/2023, Normal      al mag oxide-diphenhydramine-lidocaine viscous (MAGIC MOUTHWASH) 1:1:1 suspension Swish and spit 10 mL every 4 (four) hours as needed for mouth pain or discomfort, Starting Thu 3/23/2023, Normal      albuterol (PROVENTIL HFA,VENTOLIN HFA) 90 mcg/act inhaler inhale 2 puffs by mouth and INTO THE LUNGS every 6 hours if needed for wheezing, Normal      amLODIPine (NORVASC) 5 mg tablet take 2 tablets by mouth daily, Normal      CRANIAL PROSTHESIS, RX, Apply to area of concern as needed due to Alopecia, Print      fexofenadine (ALLEGRA) 180 MG tablet Take 1 tablet (180 mg total) by mouth daily, Starting Tue 10/26/2021, Normal      gabapentin (Neurontin) 100 mg capsule Take 1 capsule (100 mg total) by mouth 3 (three) times a day, Starting Tue 3/28/2023, Normal      hydrocortisone 2 5 % cream Apply topically 4 (four) times a day as needed for irritation or rash, Starting Thu 4/6/2023, Normal      lidocaine-prilocaine (EMLA) cream Apply topically as needed for mild pain, Starting Wed 1/4/2023, Normal      LORazepam (Ativan) 0 5 mg tablet Take 0 5 tablets (0 25 mg total) by mouth 2 (two) times a day, Starting Fri 4/21/2023, Normal      melatonin 3 mg Take 1 tablet (3 mg total) by mouth daily at bedtime, Starting Tue 2/14/2023, Normal      montelukast (SINGULAIR) 10 mg tablet take 1 tablet by mouth at bedtime, Normal      naloxone (NARCAN) 4 mg/0 1 mL nasal spray Administer 1 spray into a nostril   If no response after 2-3 minutes, give another dose in the other nostril using a new spray , Normal      nystatin (MYCOSTATIN) powder Apply topically 4 (four) times a day for 14 days, Starting Fri 3/24/2023, Until Fri 4/7/2023, Normal      omeprazole (PriLOSEC) 20 mg delayed release capsule Take 1 capsule (20 mg total) by mouth daily, Starting Mon 4/24/2023, Normal      ondansetron (ZOFRAN-ODT) 8 mg disintegrating tablet Take 1 tablet (8 mg total) by mouth every 8 (eight) hours as needed for nausea or vomiting, Starting Fri 4/14/2023, Normal      oxyCODONE (ROXICODONE) 5 immediate release tablet Take 1 tablet (5 mg total) by mouth every 6 (six) hours as needed for moderate pain Max Daily Amount: 20 mg, Starting Fri 4/21/2023, Normal      senna-docusate sodium (SENOKOT-S) 8 6-50 mg per tablet Take 1 tablet by mouth daily, Starting Tue 3/28/2023, Normal      Symbicort 160-4 5 MCG/ACT inhaler inhale 2 puffs by mouth twice a day Rinse mouth after use, Normal      triamcinolone (KENALOG) 0 1 % cream Apply topically 2 (two) times a day for 14 days, Starting Fri 3/24/2023, Until Fri 4/7/2023, Normal      urea (CARMOL) 10 % cream Apply topically as needed for dry skin, Starting Thu 4/13/2023, Normal             No discharge procedures on file  Remy Peters DO        This note was partially completed using voice recognition technology, and was scanned for gross errors; however some errors may still exist  Please contact the author with any questions or requests for clarification        Remy Peters DO  05/05/23 0037

## 2023-04-28 NOTE — PROGRESS NOTES
Title: day 2 and Ziextenzo injection due to insurance    Date patient scheduled: 5/11-5/12    Original medication ordered: Neulasta on pro    New Medication ordered: Homero Xiong injection    Office RN notified patient? ? yes    Is the patient scheduled within 24 hours? ? If yes, follow up with verbal telephone call  Office RN to route to [facility]  Infusion  pool routes to [facility]  Infusion tech to receive message, confirm scheduled treatment duration matches ordered treatment duration or adjust accordingly, and re-link appointment request orders  Infusion tech to notify pharmacy and finance

## 2023-04-28 NOTE — DISCHARGE INSTRUCTIONS
Please return to the emergency department if you are able to, especially symptoms are getting worse  Otherwise closely follow-up with your oncologist and family doctor

## 2023-04-28 NOTE — PROGRESS NOTES
PHYSICAL THERAPY - DAILY TREATMENT NOTE    Patient Name: Cindi Roman        Date: 2018  : 1944    Patient  Verified: YES  Visit #:     Insurance: Payor: [employer] Plan / Plan: [place] MEDICARE PART A & B / Product Type: Medicare /      In time: 11 Out time: 12   Total Treatment Time: 60     Medicare Time Tracking (below)   Total Timed Codes (min):  50 1:1 Treatment Time:  50     TREATMENT AREA/ DIAGNOSIS = Unilateral primary osteoarthritis, right knee [M17.11]  Presence of left artificial knee joint [Z96.652]    SUBJECTIVE  Pain Level (on 0 to 10 scale):  1  / 10   Medication Changes/New allergies or changes in medical history, any new surgeries or procedures?     NO    If yes, update Summary List   Subjective Functional Status/Changes:  [x]  No changes reported     Functional improvement    Functional limitation       OBJECTIVE  Modalities Rationale:     decrease edema, decrease inflammation and decrease pain to improve patient's ability to perform ADLs without pain   min [] Estim, type/location:                                      []  att     []  unatt     []  w/US     []  w/ice    []  w/heat    min []  Mechanical Traction: type/lbs                   []  pro   []  sup   []  int   []  cont    []  before manual    []  after manual    min []  Ultrasound, settings/location:      min []  Iontophoresis w/ dexamethasone, location:                                               []  take home patch       []  in clinic   10 min [x]  Ice     []  Heat    location/position:     min []  Vasopneumatic Device, press/temp:     min []  Other:    [x] Skin assessment post-treatment (if applicable):    [x]  intact    [x]  redness- no adverse reaction     []redness  adverse reaction:        10 min Manual Therapy: Manual rolling to R thigh/quad, patellar mobs and passive R knee ext   Rationale:      decrease pain, increase ROM and increase tissue extensibility to improve patient's ability to perform ADLs without Patient to have repeat labs on 5/2/23, treatment deferred from 5/5/23 to 5/12/23 with labs prior to  Title: addition of neulasta on pro    Date patient scheduled: 5/12/23    Original medication ordered: n/a    New Medication ordered: neulasta on pro    Office RN notified patient? ? Yes, my chart on 4/28    Is the patient scheduled within 24 hours? ? If yes, follow up with verbal telephone call  no  Office RN to route to [facility]  Infusion  pool routes to [facility]  Infusion tech to receive message, confirm scheduled treatment duration matches ordered treatment duration or adjust accordingly, and re-link appointment request orders  Infusion tech to notify pharmacy and finance  pain    40 min Therapeutic Exercise:  [x]  See flow sheet   Rationale:      increase ROM, increase strength, improve coordination, improve balance and increase proprioception to improve the patients ability to perform ADLs without pain          min Patient Education:  Yes    [x] Reviewed HEP   []  Progressed/Changed HEP based on: Other Objective/Functional Measures:    Pt with full active ext   Increased PREs   Post Treatment Pain Level (on 0 to 10) scale:   0  / 10     ASSESSMENT  Assessment/Changes in Function:     Pt with difficulty with R unilateral TG squats, no problem with L TG squats      []  See Progress Note/Recertification   Patient will continue to benefit from skilled PT services to modify and progress therapeutic interventions, address functional mobility deficits, address ROM deficits, address strength deficits, analyze and address soft tissue restrictions, analyze and cue movement patterns, analyze and modify body mechanics/ergonomics and assess and modify postural abnormalities to attain remaining goals.    Progress toward goals / Updated goals:    Improving ROM and less pain     PLAN  [x]  Upgrade activities as tolerated YES Continue plan of care   []  Discharge due to :    []  Other:      Therapist: Dana Doran PT    Date: 6/12/2018 Time: 12:08 PM        Future Appointments  Date Time Provider Jim Pendleton   6/15/2018 11:00 AM Dana Doran PT [facility]   6/19/2018 11:00 AM Dana Doran PT [facility]   6/22/2018 11:00 AM Dana Doran PT [facility]   6/26/2018 11:00 AM Dana Doran PT [facility]   6/29/2018 11:00 AM Dana Doran PT [facility]

## 2023-04-28 NOTE — TELEPHONE ENCOUNTER
Patient to have repeat labs on 5/2/23, treatment deferred from 5/5/23 to 5/12/23 with labs prior to  Title: addition of neulasta on pro    Date patient scheduled: 5/12/23    Original medication ordered: n/a    New Medication ordered: neulasta on pro    Office RN notified patient? ? Yes, my chart on 4/28    Is the patient scheduled within 24 hours? ? If yes, follow up with verbal telephone call  no  Office RN to route to [facility]  Infusion  pool routes to [facility]  Infusion tech to receive message, confirm scheduled treatment duration matches ordered treatment duration or adjust accordingly, and re-link appointment request orders  Infusion tech to notify pharmacy and finance

## 2023-04-28 NOTE — TELEPHONE ENCOUNTER
Title: day 2 and Ziextenzo injection due to insurance    Date patient scheduled: 5/11-5/12    Original medication ordered: Neulasta on pro    New Medication ordered: Geri Aguillon injection    Office RN notified patient? ? yes    Is the patient scheduled within 24 hours? ? If yes, follow up with verbal telephone call  Office RN to route to [facility]  Infusion  pool routes to [facility]  Infusion tech to receive message, confirm scheduled treatment duration matches ordered treatment duration or adjust accordingly, and re-link appointment request orders  Infusion tech to notify pharmacy and finance

## 2023-04-30 NOTE — PROGRESS NOTES
Hematology/Oncology Outpatient Office Note    Date of Service: 5/3/2023     Rue Shaheen Barbosa    Reason for Consultation:   Chief Complaint   Patient presents with    Follow-up       Cancer Stage at diagnosis: IIIB    Referral Physician: No ref  provider found    Primary Care Physician:  Marlene Balderas MD     Nickname: [place]    Spouse: Mandeep Murphy ECO    Today's ECO     Goals and Barriers:  Current Goal: Minimize effects of disease burden, extend life  Barriers to accomplishing this: None    Patient's Capacity to Self Care:  Patient is able to self care    Code Status: not addressed today    Advanced directives: not addressed today    ASSESSMENT & PLAN      Diagnosis ICD-10-CM Associated Orders   1  Carcinoma of right breast metastatic to axillary lymph node (HCC)  C50 911     C77 3       2  Chemotherapy induced neutropenia (HCC)  D70 1     T45 1X5A       3  Triple negative breast cancer ([facility] [address] )  C50 919       4  Neutropenic fever ([facility] [address] )  D70 9     R50 81           This is a 42 y o  c PMHx notable for asthma, HTN, being seen in consultation for locally advanced triple negative breast cancer    Infusion reaction C1D8 to Taxol, increased Dex to 20mg in premed and didn't tolerate well so thus changed to Docetaxel for C4  Discussion of decision making   Oncology history updated, accordingly, during this visit   Goals of care/patient communication  o I discussed with the patient the clinical course leading up to their cancer diagnosis  I reviewed relevant office notes, imaging reports and pathology result as well   o I told the patient that this is a case of curable disease and what this means  We discussed that the goal of anti-cancer therapy is to provide best quality of life, extend overall survival, and progression free survival as shown in clinical trials   We also discussed that there might be a point when the cancer will no longer respond to this anti-neoplastic therapy   o I explained the risks/benefits of the proposed cancer therapy: Pembro + Carbo-Taxol for 4 cycles then 4 cycles of Pembro + adriamycin/cytoxan followed by surgery and then maintenance Pembro (for 9 cycles) and after discussion including understanding risks of possible life-threatening complications and therapy-related malignancy development, informed consent for blood products and treatment has been signed and obtained  Caryn Mcburney TNM/Staging At Diagnosis  o [id number]  Cancer Staging   IIIB   Disease Features/Tumor Markers/Genetics  o Tumor Marker: n/a  o Notable Path Features: 12/22/2022: Invasive mammary carcinoma, suma grade 2, nuclear grade 3  ER/DE/HER-2 0, Ki-67 50%, nuclear grade 3   Treatment: neoadjuvant Pembro-Carbo-taxol followed by [facility], then surgery, then maintenance Pembro   Other Supportive care: Neulasta   Treatment Team Members  o Surgeon: Dr Ankita Walker  o 1/24/23: WBC 11 22, Hgb 13, MCV 92, PLT 323K, diff normal  CMP normal    o 4/11/23: WBC 4 75, Hgb 13 5, MCV 97, PLT 248K, diff normal  CMP normal  TSH normal   o 5/2/2023: WBC 3 99, Hgb 11 4, MCV 98, PLT 288K, ANC 1 64, rest of diff acceptable  CMP acceptable   Diagnostics  12/15/2022 Right Mammogram diagnostic bilateral w 3d & cad: There is an ill-defined approximately 5 cm focal asymmetry seen in the inner central region of the right breast in the anterior depth  The asymmetry correlates with the palpable mass reported by the patient  US breast right limited (diagnostic): Within the area of palpable concern, there are multiple ill-defined hypoechoic masses  Biopsy of the 17 mm x 13 mm x 23 mm irregularly shaped, hypoechoic mass with indistinct margins in the right breast at 2 o'clock, 6 cm from the nipple and 20 mm x 7 mm x 28 mm irregularly shaped, hypoechoic mass with indistinct margins seen in the right breast at 4 o'clock, 6 cm from the nipple are recommended   [address] breast right limited (diagnostic): A lymph node with focally thickened cortex is noted in the right axilla  12/29/2022 CT CAP w/c: Multiple small lung nodules  These may be inflammatory though a three-month follow-up is recommended to completely exclude metastasis  Unremarkable abdomen and pelvis without evidence of metastatic disease  Minimal paraseptal emphysema at the apices  1/4/2023 Nuc Bone scan: No scintigraphic evidence of osseous metastasis  1/9/2023: TTE LVEF 60%    We discussed possibility of infertility on chemo on 1/4/2023 but she has deferred fertility referral as she is not interested in having more children  Discussion of decision making    I personally reviewed the following lab results, the image studies, pathology, other specialty/physicians consult notes and recommendations, and outside medical records from [facility]  I had a lengthy discussion with the patient and shared the work-up findings  We discussed the diagnosis and management plan as below  I spent 20 minutes reviewing the records (labs, clinician notes, outside records, medical history, ordering medicine/tests/procedures, interpreting the imaging/labs previously done) and coordination of care as well as direct time with the patient today, of which greater than 50% of the time was spent in counseling and coordination of care with the patient/family  · Plan/Labs  · Continue treatment plan --> Neoadjuvant Pembro-Carbo-taxol for 4 cycles then 4 cycles of Pembro + adriamycin/cytoxan followed by surgery  After surgery, plan is for maintenance Pembro for 9 cycles  Labs preceding chemo/immunotherapy to be completed with CBC, CMP, TSH  · AC to continue C6D1 5/11/23 as patient had neutropenic fever with bacterial sinusitis  On antibiotics currently  Will be receiving neulasta support with future chemotherapy treatments     · She will take Claritin 10mg day of neulasta and at least 5 days afterwards to help prevent bone pain, myalgias related to G-CSF  · Mild pruritus could be abx related or immunotherapy related  No rashes  Patient to take OTC Claritin to help with pruritus  She will let us know if she develops rashes or other allergic like s/s  · Baseline echo 1/2023 done  ECHO scheduled for 5/16/23  Will follow  · Zofran 8mg q8 prn nausea/vomiting previously sent home  · F/u Surg Onc for eventual goal of surgery  · F/u genetics counselor testing    Follow Up: q3 weeks for 8 total visits alternating between MD & MARLEE scheduled thru 9/21/2023  All questions were answered to the patient's satisfaction during this encounter  The patient knows the contact information for our office and knows to reach out for any relevant concerns related to this encounter  They are to call for any temperature 100 4 or higher, new symptoms including but not restricted to shaking chills, decreased appetite, nausea, vomiting, diarrhea, increased fatigue, shortness of breath or chest pain, confusion, and not feeling the strength to come to the clinic  For all other listed problems and medical diagnosis in their chart - they are managed by PCP and/or other specialists, which the patient acknowledges  Thank you very much for your consultation and making us a part of this patient's care  We are continuing to follow closely with you  Please do not hesitate to reach out to me with any additional questions or concerns  Maria Isabel Ragland PA-C   Hematology & Medical Oncology Staff         Disclaimer: This document was prepared using [employer] Direct technology  If a word or phrase is confusing, or does not make sense, this is likely due to recognition error which was not discovered during this clinician's review  If you believe an error has occurred, please contact me through [address] [place] line for regina? cation        ONCOLOGY HISTORY OF PRESENT ILLNESS        Oncology History   Malignant neoplasm of overlapping sites of right female breast ([facility] [address] ) (Resolved)   12/29/2022 Initial Diagnosis    Malignant neoplasm of overlapping sites of right female breast [facility])     Carcinoma of right breast metastatic to axillary lymph node ([facility] [address] )   12/22/2022 -  Cancer Staged    Staging form: Breast, AJCC 8th Edition  - Clinical stage from 12/22/2022: Stage IIIB (cT3, cN1, cM0, G2, ER-, MS-, HER2-) - Signed by Glynn Franklin MD on 1/4/2023  Stage prefix: Initial diagnosis  Nuclear grade: G3  Mitotic count score: Score 1  Histologic grading system: 3 grade system  Menopausal status: Premenopausal       12/22/2022 Initial Diagnosis    Triple negative breast cancer ([facility] [address] )     12/22/2022 Biopsy    Right breast ultrasound guided biopsy  A  2 o'clock, 6cm from nipple  Invasive mammary carcinoma of no special type (ductal NST/invasive ductal carcinoma)   Grade 2  ER 0  MS 0   HER2 0  Ki-67 50%    B  4 o'clock, 6cm from nipple  Invasive mammary carcinoma of no special type (ductal NST/invasive ductal carcinoma)   Grade 2   ER 0   MS 0   HER2 2+   FISH negative  Ki-67 40%     C  Right Axillary lymph node  Metastatic carcinoma consistent with mammary primary     12/29/2022 Initial Diagnosis    Carcinoma of right breast metastatic to axillary lymph node ([facility] [address] )     12/30/2022 Genetic Testing    [facility]  A total of 36 genes were evaluated, including: FRANKO, BRCA1, BRCA2, CDH1, CHEK2, PALB2, PTEN, STK11, TP53  Negative result   No pathogenic sequence variants or deletions/duplications identified       1/13/2023 -  Chemotherapy    DOXOrubicin (ADRIAMYCIN), 60 mg/m2 = 113 mg, Intravenous, Once, 2 of 4 cycles  Administration: 113 mg (4/14/2023)  Pegfilgrastim-bmez (ZIEXTENZO), 6 mg, Subcutaneous, Once, 1 of 4 cycles  cyclophosphamide (CYTOXAN) IVPB, 600 mg/m2 = 1,134 mg, Intravenous, Once, 2 of 4 cycles  Administration: 1,134 mg (4/14/2023)  CARBOplatin (PARAPLATIN) IVPB (GOG AUC DOSING), 201 9 mg (83 1 % of original dose 243 mg), Intravenous, Once, 4 of 4 cycles  Dose modification:   (original dose 243 mg, Cycle 1, Reason: Other (Must fill in a comment)),   (original dose 243 mg, Cycle 1)  Administration: 201 9 mg (1/13/2023), 206 7 mg (1/20/2023), 175 2 mg (2/3/2023), 206 7 mg (1/27/2023), 206 7 mg (2/10/2023), 206 7 mg (2/24/2023), 206 7 mg (2/17/2023), 206 7 mg (3/3/2023), 206 7 mg (3/17/2023), 206 7 mg (3/10/2023), 206 7 mg (3/24/2023), 206 7 mg (4/5/2023)  DOCEtaxel (TAXOTERE) chemo infusion, 75 mg/m2 = 141 8 mg (100 % of original dose 75 mg/m2), Intravenous, Once, 1 of 1 cycle  Dose modification: 75 mg/m2 (original dose 75 mg/m2, Cycle 4)  Administration: 141 8 mg (3/17/2023)  PACLItaxel (TAXOL) chemo IVPB, 80 mg/m2 = 151 2 mg, Intravenous, Once, 3 of 3 cycles  Administration: 151 2 mg (1/13/2023), 151 2 mg (1/20/2023), 151 2 mg (2/3/2023), 151 2 mg (1/27/2023), 151 2 mg (2/10/2023), 151 2 mg (2/24/2023), 151 2 mg (2/17/2023), 151 2 mg (3/3/2023), 151 2 mg (3/10/2023)  aprepitant (CINVANTI) in  mL IVPB, 130 mg, Intravenous, Once, 2 of 4 cycles  Administration: 130 mg (4/14/2023)  pembrolizumab (KEYTRUDA) IVPB, 200 mg, Intravenous, Once, 6 of 17 cycles  Administration: 200 mg (1/13/2023), 200 mg (4/14/2023), 200 mg (2/3/2023), 200 mg (2/24/2023), 200 mg (3/17/2023)     Triple negative breast [facility] ([facility] )   12/22/2022 Initial Diagnosis    Triple negative breast cancer ([facility] [address] )     12/22/2022 Biopsy    Right breast ultrasound guided biopsy  A  2 o'clock, 6cm from nipple  Invasive mammary carcinoma of no special type (ductal NST/invasive ductal carcinoma)   Grade 2  ER 0  MO 0   HER2 0  Ki-67 50%    B  4 o'clock, 6cm from nipple  Invasive mammary carcinoma of no special type (ductal NST/invasive ductal carcinoma)   Grade 2   ER 0   MO 0   HER2 2+   FISH negative  Ki-67 40%     C  Right Axillary lymph node  Metastatic carcinoma consistent with mammary primary     12/30/2022 Genetic Testing    [facility]  A total of 36 genes were evaluated, including: FRANKO, BRCA1, BRCA2, CDH1, CHEK2, PALB2, PTEN, STK11, TP53  Negative result   No pathogenic sequence variants or deletions/duplications identified             SUBJECTIVE  (INTERVAL HISTORY)        Interval History 5/3/2023:  Patient taking antibiotics for sinusitis  She has mild pruritus in several areas  Otherwise, no rash development anywhere  She is not taking anything for it  Otherwise, no new HA, visual changes, CP, SOB, abd pain, N/V/D, peripheral neuropathy, bleeding anywhere  Prior rashes completely resolved  I have reviewed the relevant past medical, surgical, social and family history  I have also reviewed allergies and medications for this patient  Review of Systems  Review of Systems   Constitutional: Negative for chills, decreased appetite, diaphoresis, fever, malaise/fatigue, night sweats, weight gain and weight loss  HENT: Negative for nosebleeds  No white patches to oral cavity  +alopecia  Eyes: Negative for blurred vision, double vision, vision loss in left eye and vision loss in right eye  Cardiovascular: Negative for chest pain, cyanosis, dyspnea on exertion, irregular heartbeat, leg swelling and palpitations  Respiratory: Negative for cough, hemoptysis, shortness of breath, sleep disturbances due to breathing, snoring, sputum production and wheezing  Endocrine: Negative for cold intolerance  Hematologic/Lymphatic: Negative for adenopathy and bleeding problem  Does not bruise/bleed easily  Skin: Negative for color change, dry skin, flushing, nail changes, poor wound healing  + pruritus   Musculoskeletal: Negative for arthritis, back pain, joint pain, muscle cramps and muscle weakness  Genitourinary: Negative for bladder incontinence, frequency, hematuria, hesitancy, incomplete emptying, menorrhagia, pelvic pain and urgency  Neurological: Negative for difficulty with concentration, excessive daytime sleepiness, dizziness, light-headedness, loss of balance and weakness  Psychiatric/Behavioral: Negative for altered mental status   The patient is nervous/anxious  A 10-point review of system was performed, pertinent positive and negative were detailed as above  Otherwise, the 10-point review of system was negative        Past Medical History:   Diagnosis Date    Asthma     Hypertension        Past Surgical History:   Procedure Laterality Date     SECTION      IR PORT PLACEMENT  2023    MRI BREAST BIOPSY LEFT (ALL INCLUSIVE) Left 2023    TUBAL LIGATION      US GUIDANCE BREAST BIOPSY RIGHT EACH ADDITIONAL Right 2022    US GUIDED BREAST BIOPSY RIGHT COMPLETE Right 2022    US GUIDED BREAST LYMPH NODE BIOPSY RIGHT Right 2022       Family History   Problem Relation Age of Onset    Lung cancer Mother 61    Ovarian cancer Mother     No Known Problems Daughter     No Known Problems Daughter     Skin cancer Maternal Grandmother     Ovarian cancer Maternal Grandmother     Prostate cancer Maternal Grandfather     Breast cancer Maternal Aunt     Ovarian cancer Maternal Aunt     Breast cancer Paternal Aunt        Social History     Socioeconomic History    Marital status: Single     Spouse name: Not on file    Number of children: Not on file    Years of education: Not on file    Highest education level: Not on file   Occupational History    Not on file   Tobacco Use    Smoking status: Every Day     Packs/day: 0 50     Years: 15 00     Pack years: 7 50     Types: Cigarettes    Smokeless tobacco: Never   Vaping Use    Vaping Use: Never used   Substance and Sexual Activity    Alcohol use: Not Currently    Drug use: Never    Sexual activity: Yes     Partners: Male     Birth control/protection: Female Sterilization   Other Topics Concern    Not on file   Social History Narrative    Not on file     Social Determinants of Health     Financial Resource Strain: Not on file   Food Insecurity: Not on file   Transportation Needs: Not on file   Physical Activity: Not on file   Stress: Not on file   Social Connections: Not on file   Intimate Partner Violence: Not on file   Housing Stability: Not on file       No Known Allergies    Current Outpatient Medications   Medication Sig Dispense Refill    prochlorperazine (COMPAZINE) 10 mg tablet Take 1 tablet (10 mg total) by mouth every 6 (six) hours as needed for nausea or vomiting 30 tablet 0    al mag oxide-diphenhydramine-lidocaine viscous (MAGIC MOUTHWASH) 1:1:1 suspension Swish and spit 10 mL every 4 (four) hours as needed for mouth pain or discomfort 90 mL 0    albuterol (PROVENTIL HFA,VENTOLIN HFA) 90 mcg/act inhaler inhale 2 puffs by mouth and INTO THE LUNGS every 6 hours if needed for wheezing 18 g 11    amLODIPine (NORVASC) 5 mg tablet take 2 tablets by mouth daily 60 tablet 2    CRANIAL PROSTHESIS, RX, Apply to area of concern as needed due to Alopecia 1 each 0    fexofenadine (ALLEGRA) 180 MG tablet Take 1 tablet (180 mg total) by mouth daily 30 tablet 5    gabapentin (Neurontin) 100 mg capsule Take 1 capsule (100 mg total) by mouth 3 (three) times a day 90 capsule 0    hydrocortisone 2 5 % cream Apply topically 4 (four) times a day as needed for irritation or rash 30 g 0    levofloxacin (LEVAQUIN) 750 mg tablet Take 1 tablet (750 mg total) by mouth every 24 hours for 10 days 10 tablet 0    lidocaine-prilocaine (EMLA) cream Apply topically as needed for mild pain 30 g 0    LORazepam (Ativan) 0 5 mg tablet Take 0 5 tablets (0 25 mg total) by mouth 2 (two) times a day 14 tablet 0    melatonin 3 mg Take 1 tablet (3 mg total) by mouth daily at bedtime 30 tablet 5    montelukast (SINGULAIR) 10 mg tablet take 1 tablet by mouth at bedtime (Patient not taking: Reported on 2/14/2023) 30 tablet 5    naloxone (NARCAN) 4 mg/0 1 mL nasal spray Administer 1 spray into a nostril  If no response after 2-3 minutes, give another dose in the other nostril using a new spray   1 each 1    nystatin (MYCOSTATIN) powder Apply topically 4 (four) times a day for 14 days 15 g 0    omeprazole (PriLOSEC) 20 mg delayed release capsule Take 1 capsule (20 mg total) by mouth daily 30 capsule 1    ondansetron (ZOFRAN-ODT) 8 mg disintegrating tablet Take 1 tablet (8 mg total) by mouth every 8 (eight) hours as needed for nausea or vomiting 20 tablet 0    oxyCODONE (ROXICODONE) 5 immediate release tablet Take 1 tablet (5 mg total) by mouth every 6 (six) hours as needed for moderate pain Max Daily Amount: 20 mg 30 tablet 0    senna-docusate sodium (SENOKOT-S) 8 6-50 mg per tablet Take 1 tablet by mouth daily 30 tablet 5    Symbicort 160-4 5 MCG/ACT inhaler inhale 2 puffs by mouth twice a day Rinse mouth after use 10 2 g 5    triamcinolone (KENALOG) 0 1 % cream Apply topically 2 (two) times a day for 14 days 30 g 0    urea (CARMOL) 10 % cream Apply topically as needed for dry skin 71 g 0     No current facility-administered medications for this visit  Facility-Administered Medications Ordered in Other Visits   Medication Dose Route Frequency Provider Last Rate Last Admin    alteplase (CATHFLO) injection 2 mg  2 mg Intracatheter Q2H PRN Diana Irene MD           (Not in a hospital admission)      Objective:     24 Hour Vitals Assessment:     Vitals:    05/03/23 1232   BP: 108/70   Resp: 16   Temp: 98 2 °F (36 8 °C)       PHYSICIAN EXAM:    General: Appearance: alert, cooperative, no distress  HEENT: Normocephalic, atraumatic  No scleral icterus  conjunctivae clear  EOMI  Chest: No tenderness to palpation  No open wound noted  Port in place  Lungs: No respiratory distress, Respirations unlabored  Cardiac: Tachycardia, +S1and S2  Abdomen: Soft, non-tender, non-distended  Extremities:  No edema, cyanosis, clubbing  Skin: Skin color, turgor are normal  +slight eczema, flaking of skin to bilateral hands  Lymphatics: no palpable supra-cervical, axillary, or inguinal adenopathy  Neurologic: Awake, Alert, and oriented, no gross focal deficits noted b/l         DATA REVIEW:    Pathology Result:    Final Diagnosis   Date Value Ref Range Status   01/20/2023   Final    A  Left breast, 3:00 (MRI-guided core biopsy, 12 passes):     - Benign breast tissue with fibrocystic change  - Negative for atypia and malignancy in sampled material     Comment:  Clinical / radiologic correlation is recommended to ensure adequate sampling of the targeted lesion  12/22/2022   Final    A  Ultrasound biopsy right breast 2:00, 6 cm from nipple, 4 passes 14-gauge marquee:      - Invasive mammary carcinoma of no special type (ductal NST/invasive ductal carcinoma), see note      - [place] grade 2 of 3 (total score: 7 of 9)          - Tubule formation: <10%, score 3          - Nuclear grade 3 of 3, score 3          - Mitoses < 3/mm2, (</= 7 mitoses/10HPF), score 1      - Invasive carcinoma involves 4 of 4 submitted core biopsies, max  dimension = 16 millimeters      - Ductal carcinoma in situ (DCIS): Not identified      - Lymphovascular invasion: Not identified      - Microcalcifications: Absent      - See synoptic report A    B  Right breast ultrasound biopsy at 4:00, 6 cm from nipple, 4 passes, 14-gauge marquee:      - Invasive mammary carcinoma of no special type (ductal NST/invasive ductal carcinoma) with apocrine features, see note      - [place] grade 2 of 3 (total score: 6 of 9)          - Tubule formation: 10% to 75%, score 2          - Nuclear grade 3 of 3, score 3          - Mitoses < 3/mm2, (</= 7 mitoses/10HPF), score 1      - Invasive carcinoma involves 4 of 4 submitted core biopsies, max  dimension = 15 millimeters      - Ductal carcinoma in situ (DCIS): Not identified      - Lymphovascular invasion: Not identified      - Microcalcifications: Absent      - See synoptic report B    C   Ultrasound right breast biopsy and axillary lymph node, 4 passes 14-gauge marquee:      - Metastatic carcinoma, consistent with mammary primary, see note           Image Results:   Image result are reviewed and documented in Hematology/Oncology history    XR chest 2 views  Narrative: CHEST    INDICATION:   cough  Fever, sore throat  COMPARISON: CXR 5/29/2012 and chest CT 12/29/2022  EXAM PERFORMED/VIEWS:  XR CHEST PA & LATERAL  DUAL ENERGY SUBTRACTION  FINDINGS: Left port in right atrium  Cardiomediastinal silhouette normal     Lungs clear  No effusion or pneumothorax  Clips in the right breast     Upper abdomen normal  Bones normal for age  Impression: No acute cardiopulmonary disease  Workstation performed: [id number]      LABS:  Lab data are reviewed and documented in HemOnc history         Lab Results   Component Value Date    HGB 11 4 (L) 05/02/2023    HCT 32 8 (L) 05/02/2023    MCV 98 05/02/2023     05/02/2023    WBC 3 99 (L) 05/02/2023    NRBC 0 05/02/2023    ATYLMPCT 1 (H) 03/07/2023     Lab Results   Component Value Date    K 3 7 05/02/2023     05/02/2023    CO2 27 05/02/2023    BUN 8 05/02/2023    CREATININE 0 58 (L) 05/02/2023    GLUF 109 (H) 03/21/2023    CALCIUM 9 6 05/02/2023    CORRECTEDCA 9 3 01/17/2023    AST 17 05/02/2023    ALT 17 05/02/2023    ALKPHOS 64 05/02/2023    EGFR 116 05/02/2023       No results found for: IRON, TIBC, FERRITIN    No results found for: [id number]    Recent Labs     05/02/23  1004   WBC 3 99*       By:  Rachel Gray PA-C, 5/3/2023, 12:55 PM

## 2023-05-01 DIAGNOSIS — C50.919 TRIPLE NEGATIVE BREAST CANCER (HCC): ICD-10-CM

## 2023-05-01 DIAGNOSIS — C77.3 CARCINOMA OF RIGHT BREAST METASTATIC TO AXILLARY LYMPH NODE (HCC): Primary | ICD-10-CM

## 2023-05-01 DIAGNOSIS — D70.1 CHEMOTHERAPY INDUCED NEUTROPENIA (HCC): ICD-10-CM

## 2023-05-01 DIAGNOSIS — T45.1X5A CHEMOTHERAPY INDUCED NEUTROPENIA (HCC): ICD-10-CM

## 2023-05-01 DIAGNOSIS — C50.911 CARCINOMA OF RIGHT BREAST METASTATIC TO AXILLARY LYMPH NODE (HCC): Primary | ICD-10-CM

## 2023-05-01 DIAGNOSIS — D70.9 NEUTROPENIC FEVER (HCC): ICD-10-CM

## 2023-05-01 DIAGNOSIS — R50.81 NEUTROPENIC FEVER (HCC): ICD-10-CM

## 2023-05-02 ENCOUNTER — PATIENT OUTREACH (OUTPATIENT)
Dept: HEMATOLOGY ONCOLOGY | Facility: CLINIC | Age: 38
End: 2023-05-02

## 2023-05-02 ENCOUNTER — HOSPITAL ENCOUNTER (OUTPATIENT)
Dept: INFUSION CENTER | Facility: CLINIC | Age: 38
Discharge: HOME | End: 2023-05-02

## 2023-05-02 DIAGNOSIS — C50.911 CARCINOMA OF RIGHT BREAST METASTATIC TO AXILLARY LYMPH NODE (HCC): Primary | ICD-10-CM

## 2023-05-02 DIAGNOSIS — D70.1 CHEMOTHERAPY INDUCED NEUTROPENIA (HCC): ICD-10-CM

## 2023-05-02 DIAGNOSIS — C50.919 TRIPLE NEGATIVE BREAST CANCER (HCC): ICD-10-CM

## 2023-05-02 DIAGNOSIS — C77.3 CARCINOMA OF RIGHT BREAST METASTATIC TO AXILLARY LYMPH NODE (HCC): Primary | ICD-10-CM

## 2023-05-02 DIAGNOSIS — Z95.828 PORT-A-CATH IN PLACE: ICD-10-CM

## 2023-05-02 DIAGNOSIS — D70.9 NEUTROPENIC FEVER (HCC): ICD-10-CM

## 2023-05-02 DIAGNOSIS — T45.1X5A CHEMOTHERAPY INDUCED NEUTROPENIA (HCC): ICD-10-CM

## 2023-05-02 DIAGNOSIS — R50.81 NEUTROPENIC FEVER (HCC): ICD-10-CM

## 2023-05-02 LAB
ALBUMIN SERPL BCP-MCNC: 4 G/DL (ref 3.5–5)
ALP SERPL-CCNC: 64 U/L (ref 34–104)
ALT SERPL W P-5'-P-CCNC: 17 U/L (ref 7–52)
ANION GAP SERPL CALCULATED.3IONS-SCNC: 6 MMOL/L (ref 4–13)
AST SERPL W P-5'-P-CCNC: 17 U/L (ref 13–39)
BASOPHILS # BLD AUTO: 0.03 THOUSANDS/ΜL (ref 0–0.1)
BASOPHILS NFR BLD AUTO: 1 % (ref 0–1)
BILIRUB SERPL-MCNC: 0.3 MG/DL (ref 0.2–1)
BUN SERPL-MCNC: 8 MG/DL (ref 5–25)
CALCIUM SERPL-MCNC: 9.6 MG/DL (ref 8.4–10.2)
CHLORIDE SERPL-SCNC: 105 MMOL/L (ref 96–108)
CO2 SERPL-SCNC: 27 MMOL/L (ref 21–32)
CREAT SERPL-MCNC: 0.58 MG/DL (ref 0.6–1.3)
EOSINOPHIL # BLD AUTO: 0.01 THOUSAND/ΜL (ref 0–0.61)
EOSINOPHIL NFR BLD AUTO: 0 % (ref 0–6)
ERYTHROCYTE [DISTWIDTH] IN BLOOD BY AUTOMATED COUNT: 14.3 % (ref 11.6–15.1)
GFR SERPL CREATININE-BSD FRML MDRD: 116 ML/MIN/1.73SQ M
GLUCOSE SERPL-MCNC: 103 MG/DL (ref 65–140)
HCT VFR BLD AUTO: 32.8 % (ref 34.8–46.1)
HGB BLD-MCNC: 11.4 G/DL (ref 11.5–15.4)
IMM GRANULOCYTES # BLD AUTO: 0.04 THOUSAND/UL (ref 0–0.2)
IMM GRANULOCYTES NFR BLD AUTO: 1 % (ref 0–2)
LYMPHOCYTES # BLD AUTO: 1.72 THOUSANDS/ΜL (ref 0.6–4.47)
LYMPHOCYTES NFR BLD AUTO: 43 % (ref 14–44)
MCH RBC QN AUTO: 34.1 PG (ref 26.8–34.3)
MCHC RBC AUTO-ENTMCNC: 34.8 G/DL (ref 31.4–37.4)
MCV RBC AUTO: 98 FL (ref 82–98)
MONOCYTES # BLD AUTO: 0.55 THOUSAND/ΜL (ref 0.17–1.22)
MONOCYTES NFR BLD AUTO: 14 % (ref 4–12)
NEUTROPHILS # BLD AUTO: 1.64 THOUSANDS/ΜL (ref 1.85–7.62)
NEUTS SEG NFR BLD AUTO: 41 % (ref 43–75)
NRBC BLD AUTO-RTO: 0 /100 WBCS
PLATELET # BLD AUTO: 288 THOUSANDS/UL (ref 149–390)
PMV BLD AUTO: 9.4 FL (ref 8.9–12.7)
POTASSIUM SERPL-SCNC: 3.7 MMOL/L (ref 3.5–5.3)
PROT SERPL-MCNC: 6.9 G/DL (ref 6.4–8.4)
RBC # BLD AUTO: 3.34 MILLION/UL (ref 3.81–5.12)
SODIUM SERPL-SCNC: 138 MMOL/L (ref 135–147)
WBC # BLD AUTO: 3.99 THOUSAND/UL (ref 4.31–10.16)

## 2023-05-02 NOTE — PROGRESS NOTES
Breast Oncology Nurse Navigator    Called patient to check in  She states she is on the other line with her father's nurse  She has an appointment this morning and asked if I could call back later this morning  Told her I would call back  Called patient back later in the morning  Was in the ED last Friday but states she is feeling better  Currently on antibiotics  Chemo has been delayed for a few days  Knows of her upcoming appointments  Patient knows she can reach out with questions/concerns

## 2023-05-03 ENCOUNTER — TELEPHONE (OUTPATIENT)
Dept: HEMATOLOGY ONCOLOGY | Facility: CLINIC | Age: 38
End: 2023-05-03

## 2023-05-03 ENCOUNTER — OFFICE VISIT (OUTPATIENT)
Dept: HEMATOLOGY ONCOLOGY | Facility: CLINIC | Age: 38
End: 2023-05-03

## 2023-05-03 VITALS
HEIGHT: 64 IN | WEIGHT: 179 LBS | RESPIRATION RATE: 16 BRPM | BODY MASS INDEX: 30.56 KG/M2 | TEMPERATURE: 98.2 F | SYSTOLIC BLOOD PRESSURE: 108 MMHG | DIASTOLIC BLOOD PRESSURE: 70 MMHG

## 2023-05-03 DIAGNOSIS — C50.911 CARCINOMA OF RIGHT BREAST METASTATIC TO AXILLARY LYMPH NODE (HCC): Primary | ICD-10-CM

## 2023-05-03 DIAGNOSIS — D70.1 CHEMOTHERAPY INDUCED NEUTROPENIA (HCC): ICD-10-CM

## 2023-05-03 DIAGNOSIS — R50.81 NEUTROPENIC FEVER (HCC): ICD-10-CM

## 2023-05-03 DIAGNOSIS — C77.3 CARCINOMA OF RIGHT BREAST METASTATIC TO AXILLARY LYMPH NODE (HCC): Primary | ICD-10-CM

## 2023-05-03 DIAGNOSIS — D70.9 NEUTROPENIC FEVER (HCC): ICD-10-CM

## 2023-05-03 DIAGNOSIS — C50.919 TRIPLE NEGATIVE BREAST CANCER (HCC): ICD-10-CM

## 2023-05-03 DIAGNOSIS — T45.1X5A CHEMOTHERAPY INDUCED NEUTROPENIA (HCC): ICD-10-CM

## 2023-05-03 LAB
BACTERIA BLD CULT: NORMAL
BACTERIA BLD CULT: NORMAL

## 2023-05-03 NOTE — TELEPHONE ENCOUNTER
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

## 2023-05-03 NOTE — TELEPHONE ENCOUNTER
Patient is scheduled for injection on 5/12 at 130, are we able to schedule sooner? Please see nurses note with scheduling details  Thank you! 23

## 2023-05-05 DIAGNOSIS — F41.9 ANXIETY: ICD-10-CM

## 2023-05-05 DIAGNOSIS — G89.3 CANCER RELATED PAIN: ICD-10-CM

## 2023-05-05 NOTE — TELEPHONE ENCOUNTER
Primary palliative medicine provider: FLAVIO Padgett     Medication requested: oxycodone 5 mg tablet and Ativan 0 5 mg tablet     If for pain, how has the patient been taking their pain medicine?      Last appointment: 3/28     Next scheduled appointment: 5/23       Ph[facility]: [facility]

## 2023-05-07 RX ORDER — OXYCODONE HYDROCHLORIDE 5 MG/1
5 TABLET ORAL EVERY 6 HOURS PRN
Qty: 30 TABLET | Refills: 0 | Status: SHIPPED | OUTPATIENT
Start: 2023-05-07

## 2023-05-07 RX ORDER — LORAZEPAM 0.5 MG/1
0.25 TABLET ORAL 2 TIMES DAILY
Qty: 14 TABLET | Refills: 0 | Status: SHIPPED | OUTPATIENT
Start: 2023-05-07

## 2023-05-08 ENCOUNTER — TELEPHONE (OUTPATIENT)
Dept: HEMATOLOGY ONCOLOGY | Facility: CLINIC | Age: 38
End: 2023-05-08

## 2023-05-08 ENCOUNTER — OFFICE VISIT (OUTPATIENT)
Dept: SURGICAL ONCOLOGY | Facility: CLINIC | Age: 38
End: 2023-05-08

## 2023-05-08 VITALS
OXYGEN SATURATION: 98 % | SYSTOLIC BLOOD PRESSURE: 124 MMHG | DIASTOLIC BLOOD PRESSURE: 62 MMHG | TEMPERATURE: 98.4 F | HEART RATE: 91 BPM | BODY MASS INDEX: 30.56 KG/M2 | HEIGHT: 64 IN | RESPIRATION RATE: 16 BRPM | WEIGHT: 179 LBS

## 2023-05-08 DIAGNOSIS — C77.3 CARCINOMA OF RIGHT BREAST METASTATIC TO AXILLARY LYMPH NODE (HCC): ICD-10-CM

## 2023-05-08 DIAGNOSIS — C50.919 TRIPLE NEGATIVE BREAST CANCER (HCC): Primary | ICD-10-CM

## 2023-05-08 DIAGNOSIS — C50.911 CARCINOMA OF RIGHT BREAST METASTATIC TO AXILLARY LYMPH NODE (HCC): ICD-10-CM

## 2023-05-08 NOTE — TELEPHONE ENCOUNTER
Patient Running Late       Who are you speaking with? Patient   If it is not the patient, are they listed on an active communication consent form? No   When is the appointment scheduled? Please list date and time 5/8/23 11:45am   At which location is the appointment scheduled to take place? [facility]   If patient is running less than 15 minutes late, have patient proceed to office  Appointment may need to be rescheduled at providers discretion  If provider cannot see patient today, the office will reschedule the visit  Was this relayed to patient?  Yes

## 2023-05-08 NOTE — PROGRESS NOTES
Surgical Oncology Follow Up  [facility]  [facility] Keshia Walsh Lucy DAIVLA [address] [facility]  1985  [phone]      No chief complaint on file  Assessment & Plan:   She is here for follow-up with triple negative right breast cancer stage IIIb multifocal   She is overall doing well tolerating her chemotherapy approximately 2 more cycles to go  We will see her in early July  She is leaning towards bilateral mastectomy in the form of right modified radical mastectomy and simple mastectomy on the contralateral side  Therefore we will refer her to plastic surgery consultation to discuss reconstruction options  Cancer History:     Oncology History   Malignant neoplasm of overlapping sites of right female breast ([facility] [address] ) (Resolved)   12/29/2022 Initial Diagnosis    Malignant neoplasm of overlapping sites of right female breast [facility])     Carcinoma of right breast metastatic to axillary lymph node ([facility] [address] )   12/22/2022 -  Cancer Staged    Staging form: Breast, AJCC 8th Edition  - Clinical stage from 12/22/2022: Stage IIIB (cT3, cN1, cM0, G2, ER-, OH-, HER2-) - Signed by Holland Fang MD on 1/4/2023  Stage prefix: Initial diagnosis  Nuclear grade: G3  Mitotic count score: Score 1  Histologic grading system: 3 grade system  Menopausal status: Premenopausal       12/22/2022 Initial Diagnosis    Triple negative breast cancer ([facility] [address] )     12/22/2022 Biopsy    Right breast ultrasound guided biopsy  A  2 o'clock, 6cm from nipple  Invasive mammary carcinoma of no special type (ductal NST/invasive ductal carcinoma)   Grade 2  ER 0  OH 0   HER2 0  Ki-67 50%    B  4 o'clock, 6cm from nipple  Invasive mammary carcinoma of no special type (ductal NST/invasive ductal carcinoma)   Grade 2   ER 0   OH 0   HER2 2+   FISH negative  Ki-67 40%     C   Right Axillary lymph node  Metastatic carcinoma consistent with mammary primary     12/29/2022 Initial Diagnosis    Carcinoma of right breast metastatic to axillary lymph node ([facility] [address] )     12/30/2022 Genetic Testing    [facility]  A total of 36 genes were evaluated, including: FRANKO, BRCA1, BRCA2, CDH1, CHEK2, PALB2, PTEN, STK11, TP53  Negative result   No pathogenic sequence variants or deletions/duplications identified       1/13/2023 -  Chemotherapy    DOXOrubicin (ADRIAMYCIN), 60 mg/m2 = 113 mg, Intravenous, Once, 2 of 4 cycles  Administration: 113 mg (4/14/2023)  Pegfilgrastim-bmez (ZIEXTENZO), 6 mg, Subcutaneous, Once, 1 of 4 cycles  cyclophosphamide (CYTOXAN) IVPB, 600 mg/m2 = 1,134 mg, Intravenous, Once, 2 of 4 cycles  Administration: 1,134 mg (4/14/2023)  CARBOplatin (PARAPLATIN) IVPB (GOG AUC DOSING), 201 9 mg (83 1 % of original dose 243 mg), Intravenous, Once, 4 of 4 cycles  Dose modification:   (original dose 243 mg, Cycle 1, Reason: Other (Must fill in a comment)),   (original dose 243 mg, Cycle 1)  Administration: 201 9 mg (1/13/2023), 206 7 mg (1/20/2023), 175 2 mg (2/3/2023), 206 7 mg (1/27/2023), 206 7 mg (2/10/2023), 206 7 mg (2/24/2023), 206 7 mg (2/17/2023), 206 7 mg (3/3/2023), 206 7 mg (3/17/2023), 206 7 mg (3/10/2023), 206 7 mg (3/24/2023), 206 7 mg (4/5/2023)  DOCEtaxel (TAXOTERE) chemo infusion, 75 mg/m2 = 141 8 mg (100 % of original dose 75 mg/m2), Intravenous, Once, 1 of 1 cycle  Dose modification: 75 mg/m2 (original dose 75 mg/m2, Cycle 4)  Administration: 141 8 mg (3/17/2023)  PACLItaxel (TAXOL) chemo IVPB, 80 mg/m2 = 151 2 mg, Intravenous, Once, 3 of 3 cycles  Administration: 151 2 mg (1/13/2023), 151 2 mg (1/20/2023), 151 2 mg (2/3/2023), 151 2 mg (1/27/2023), 151 2 mg (2/10/2023), 151 2 mg (2/24/2023), 151 2 mg (2/17/2023), 151 2 mg (3/3/2023), 151 2 mg (3/10/2023)  aprepitant (CINVANTI) in  mL IVPB, 130 mg, Intravenous, Once, 2 of 4 cycles  Administration: 130 mg (4/14/2023)  pembrolizumab (KEYTRUDA) IVPB, 200 mg, Intravenous, Once, 6 of 17 cycles  Administration: 200 mg (1/13/2023), 200 mg (4/14/2023), 200 mg (2/3/2023), 200 mg (2/24/2023), 200 mg (3/17/2023)     Triple negative breast cancer ([facility] )   12/22/2022 Initial Diagnosis    Triple negative breast cancer ([facility] )     12/22/2022 Biopsy    Right breast ultrasound guided biopsy  A  2 o'clock, 6cm from nipple  Invasive mammary carcinoma of no special type (ductal NST/invasive ductal carcinoma)   Grade 2  ER 0  AK 0   HER2 0  Ki-67 50%    B  4 o'clock, 6cm from nipple  Invasive mammary carcinoma of no special type (ductal NST/invasive ductal carcinoma)   Grade 2   ER 0   AK 0   HER2 2+   FISH negative  Ki-67 40%     C  Right Axillary lymph node  Metastatic carcinoma consistent with mammary primary     12/30/2022 Genetic Testing    [facility]  A total of 36 genes were evaluated, including: FRANKO, BRCA1, BRCA2, CDH1, CHEK2, PALB2, PTEN, STK11, TP53  Negative result  No pathogenic sequence variants or deletions/duplications identified             Interval History:   Follow-up with triple negative right breast cancer    Review of Systems:   Review of Systems   Constitutional: Negative for chills and fever  HENT: Negative for ear pain and sore throat  Eyes: Negative for pain and visual disturbance  Respiratory: Negative for cough and shortness of breath  Cardiovascular: Negative for chest pain and palpitations  Gastrointestinal: Negative for abdominal pain and vomiting  Genitourinary: Negative for dysuria and hematuria  Musculoskeletal: Negative for arthralgias and back pain  Skin: Negative for color change and rash  Neurological: Negative for seizures and syncope  All other systems reviewed and are negative        Past Medical History     Patient Active Problem List   Diagnosis   • Hypertension   • Asthma   • Cervical radiculopathy   • Chronic bilateral low back pain   • DDD (degenerative disc disease), lumbar   • Binge eating disorder   • BMI 33 0-33 9,adult   • Pre-diabetes   • Cigarette nicotine dependence without complication   • Carcinoma of right breast metastatic to axillary lymph node (HCC)   • Triple negative breast cancer ([facility] [address] )   • Port-A-Cath in place   • Chemotherapy induced neutropenia (HCC)     Past Medical History:   Diagnosis Date   • Asthma    • Hypertension      Past Surgical History:   Procedure Laterality Date   •  SECTION     • IR PORT PLACEMENT  2023   • MRI BREAST BIOPSY LEFT (ALL INCLUSIVE) Left 2023   • TUBAL LIGATION     • US GUIDANCE BREAST BIOPSY RIGHT EACH ADDITIONAL Right 2022   • US GUIDED BREAST BIOPSY RIGHT COMPLETE Right 2022   • US GUIDED BREAST LYMPH NODE BIOPSY RIGHT Right 2022     Family History   Problem Relation Age of Onset   • Lung cancer Mother 61   • Ovarian cancer Mother    • No Known Problems Daughter    • No Known Problems Daughter    • Skin cancer Maternal Grandmother    • Ovarian cancer Maternal Grandmother    • Prostate cancer Maternal Grandfather    • Breast cancer Maternal Aunt    • Ovarian cancer Maternal Aunt    • Breast cancer Paternal Aunt      Social History     Socioeconomic History   • Marital status: Single     Spouse name: Not on file   • Number of children: Not on file   • Years of education: Not on file   • Highest education level: Not on file   Occupational History   • Not on file   Tobacco Use   • Smoking status: Every Day     Packs/day: 0 50     Years: 15 00     Pack years: 7 50     Types: Cigarettes   • Smokeless tobacco: Never   Vaping Use   • Vaping Use: Never used   Substance and Sexual Activity   • Alcohol use: Not Currently   • Drug use: Never   • Sexual activity: Yes     Partners: Male     Birth control/protection: Female Sterilization   Other Topics Concern   • Not on file   Social History Narrative   • Not on file     Social Determinants of Health     Financial Resource Strain: Not on file   Food Insecurity: Not on file   Transportation Needs: Not on file   Physical Activity: Not on file   Stress: Not on file   Social Connections: Not on file   Intimate Partner Violence: Not on file   Housing Stability: Not on file       Current Outpatient Medications:   •  al mag oxide-diphenhydramine-lidocaine viscous (MAGIC MOUTHWASH) 1:1:1 suspension, Swish and spit 10 mL every 4 (four) hours as needed for mouth pain or discomfort, Disp: 90 mL, Rfl: 0  •  albuterol (PROVENTIL HFA,VENTOLIN HFA) 90 mcg/act inhaler, inhale 2 puffs by mouth and INTO THE LUNGS every 6 hours if needed for wheezing, Disp: 18 g, Rfl: 11  •  amLODIPine (NORVASC) 5 mg tablet, take 2 tablets by mouth daily, Disp: 60 tablet, Rfl: 2  •  CRANIAL PROSTHESIS, RX,, Apply to area of concern as needed due to Alopecia, Disp: 1 each, Rfl: 0  •  fexofenadine (ALLEGRA) 180 MG tablet, Take 1 tablet (180 mg total) by mouth daily, Disp: 30 tablet, Rfl: 5  •  gabapentin (Neurontin) 100 mg capsule, Take 1 capsule (100 mg total) by mouth 3 (three) times a day, Disp: 90 capsule, Rfl: 0  •  hydrocortisone 2 5 % cream, Apply topically 4 (four) times a day as needed for irritation or rash, Disp: 30 g, Rfl: 0  •  levofloxacin (LEVAQUIN) 750 mg tablet, Take 1 tablet (750 mg total) by mouth every 24 hours for 10 days, Disp: 10 tablet, Rfl: 0  •  lidocaine-prilocaine (EMLA) cream, Apply topically as needed for mild pain, Disp: 30 g, Rfl: 0  •  LORazepam (Ativan) 0 5 mg tablet, Take 0 5 tablets (0 25 mg total) by mouth 2 (two) times a day, Disp: 14 tablet, Rfl: 0  •  melatonin 3 mg, Take 1 tablet (3 mg total) by mouth daily at bedtime, Disp: 30 tablet, Rfl: 5  •  naloxone (NARCAN) 4 mg/0 1 mL nasal spray, Administer 1 spray into a nostril   If no response after 2-3 minutes, give another dose in the other nostril using a new spray , Disp: 1 each, Rfl: 1  •  omeprazole (PriLOSEC) 20 mg delayed release capsule, Take 1 capsule (20 mg total) by mouth daily, Disp: 30 capsule, Rfl: 1  •  ondansetron (ZOFRAN-ODT) 8 mg disintegrating tablet, Take 1 tablet (8 mg total) by mouth every 8 (eight) hours as needed for nausea or vomiting, Disp: 20 tablet, Rfl: 0  •  oxyCODONE (ROXICODONE) 5 immediate release tablet, Take 1 tablet (5 mg total) by mouth every 6 (six) hours as needed for moderate pain Max Daily Amount: 20 mg, Disp: 30 tablet, Rfl: 0  •  prochlorperazine (COMPAZINE) 10 mg tablet, Take 1 tablet (10 mg total) by mouth every 6 (six) hours as needed for nausea or vomiting, Disp: 30 tablet, Rfl: 0  •  senna-docusate sodium (SENOKOT-S) 8 6-50 mg per tablet, Take 1 tablet by mouth daily, Disp: 30 tablet, Rfl: 5  •  Symbicort 160-4 5 MCG/ACT inhaler, inhale 2 puffs by mouth twice a day Rinse mouth after use, Disp: 10 2 g, Rfl: 5  •  urea (CARMOL) 10 % cream, Apply topically as needed for dry skin, Disp: 71 g, Rfl: 0  •  montelukast (SINGULAIR) 10 mg tablet, take 1 tablet by mouth at bedtime (Patient not taking: Reported on 2/14/2023), Disp: 30 tablet, Rfl: 5  •  nystatin (MYCOSTATIN) powder, Apply topically 4 (four) times a day for 14 days, Disp: 15 g, Rfl: 0  •  triamcinolone (KENALOG) 0 1 % cream, Apply topically 2 (two) times a day for 14 days, Disp: 30 g, Rfl: 0  No Known Allergies    Physical Exam:     Vitals:    05/08/23 1204   BP: 124/62   Pulse: 91   Resp: 16   Temp: 98 4 °F (36 9 °C)   SpO2: 98%     Physical Exam  Constitutional:       Appearance: Normal appearance  HENT:      Head: Normocephalic and atraumatic  Nose: Nose normal       Mouth/Throat:      Mouth: Mucous membranes are moist    Eyes:      Pupils: Pupils are equal, round, and reactive to light  Cardiovascular:      Rate and Rhythm: Normal rate  Pulses: Normal pulses  Heart sounds: Normal heart sounds  Pulmonary:      Effort: Pulmonary effort is normal       Breath sounds: Normal breath sounds  Chest:      Comments: Right breast examination; no palpable mass or masses  No nipple discharge no nipple retraction  No skin changes     Right axillary and supraclavicular examination no palpable adenopathy    Right breast was examined in supine and prone position    Left breast examination no palpable mass ,masses ,nipple discharge, nipple retraction or skin changes  Left breast was also examined supine and prone position  Abdominal:      General: Bowel sounds are normal       Palpations: Abdomen is soft  Musculoskeletal:         General: Normal range of motion  Cervical back: Normal range of motion and neck supple  Skin:     General: Skin is warm  Neurological:      General: No focal deficit present  Mental Status: She is alert and oriented to person, place, and time  Psychiatric:         Mood and Affect: Mood normal          Behavior: Behavior normal          Thought Content: Thought content normal          Judgment: Judgment normal            Results & Discussion:   I did review surgical options given her 2 local lesions as well as positive right axillary lymph node cosmetic concerns were discussed given to lesions  Even though no survival advantage because of the cosmetic concerns I would recommend her mastectomy in the right side  She understands she has an option for right breast conservation surgery as well  Anyway she is going to discuss with her options with the plastic surgeons and come and see us  She understand with the breast conservation surgery significant cosmetic concerns could be with poor cosmetic outcome  I did discussed in detail nature of breast cancer triple negative and further surgical management she understands and  agrees   All patient questions were answered  Advance Care Planning/Advance Directives: Raquel Santamaria MD discussed the disease status with Vincent Boogie  today 05/08/23  treatment plans and follow-up with the patient

## 2023-05-09 ENCOUNTER — HOSPITAL ENCOUNTER (OUTPATIENT)
Dept: INFUSION CENTER | Facility: CLINIC | Age: 38
Discharge: HOME | End: 2023-05-09

## 2023-05-09 DIAGNOSIS — Z95.828 PORT-A-CATH IN PLACE: ICD-10-CM

## 2023-05-09 DIAGNOSIS — C77.3 CARCINOMA OF RIGHT BREAST METASTATIC TO AXILLARY LYMPH NODE (HCC): ICD-10-CM

## 2023-05-09 DIAGNOSIS — C50.919 TRIPLE NEGATIVE BREAST CANCER (HCC): ICD-10-CM

## 2023-05-09 DIAGNOSIS — D70.1 CHEMOTHERAPY INDUCED NEUTROPENIA (HCC): Primary | ICD-10-CM

## 2023-05-09 DIAGNOSIS — T45.1X5A CHEMOTHERAPY INDUCED NEUTROPENIA (HCC): Primary | ICD-10-CM

## 2023-05-09 DIAGNOSIS — C50.911 CARCINOMA OF RIGHT BREAST METASTATIC TO AXILLARY LYMPH NODE (HCC): ICD-10-CM

## 2023-05-09 LAB
ALBUMIN SERPL BCP-MCNC: 4.2 G/DL (ref 3.5–5)
ALP SERPL-CCNC: 70 U/L (ref 34–104)
ALT SERPL W P-5'-P-CCNC: 15 U/L (ref 7–52)
ANION GAP SERPL CALCULATED.3IONS-SCNC: 8 MMOL/L (ref 4–13)
AST SERPL W P-5'-P-CCNC: 18 U/L (ref 13–39)
BASOPHILS # BLD AUTO: 0.05 THOUSANDS/ÂΜL (ref 0–0.1)
BASOPHILS NFR BLD AUTO: 1 % (ref 0–1)
BILIRUB SERPL-MCNC: 0.36 MG/DL (ref 0.2–1)
BUN SERPL-MCNC: 8 MG/DL (ref 5–25)
CALCIUM SERPL-MCNC: 10 MG/DL (ref 8.4–10.2)
CHLORIDE SERPL-SCNC: 105 MMOL/L (ref 96–108)
CO2 SERPL-SCNC: 25 MMOL/L (ref 21–32)
CREAT SERPL-MCNC: 0.61 MG/DL (ref 0.6–1.3)
EOSINOPHIL # BLD AUTO: 0.04 THOUSAND/ÂΜL (ref 0–0.61)
EOSINOPHIL NFR BLD AUTO: 1 % (ref 0–6)
ERYTHROCYTE [DISTWIDTH] IN BLOOD BY AUTOMATED COUNT: 14.5 % (ref 11.6–15.1)
GFR SERPL CREATININE-BSD FRML MDRD: 113 ML/MIN/1.73SQ M
GLUCOSE SERPL-MCNC: 117 MG/DL (ref 65–140)
HCT VFR BLD AUTO: 35 % (ref 34.8–46.1)
HGB BLD-MCNC: 11.9 G/DL (ref 11.5–15.4)
IMM GRANULOCYTES # BLD AUTO: 0.03 THOUSAND/UL (ref 0–0.2)
IMM GRANULOCYTES NFR BLD AUTO: 1 % (ref 0–2)
LYMPHOCYTES # BLD AUTO: 2.15 THOUSANDS/ÂΜL (ref 0.6–4.47)
LYMPHOCYTES NFR BLD AUTO: 35 % (ref 14–44)
MCH RBC QN AUTO: 33.8 PG (ref 26.8–34.3)
MCHC RBC AUTO-ENTMCNC: 34 G/DL (ref 31.4–37.4)
MCV RBC AUTO: 99 FL (ref 82–98)
MONOCYTES # BLD AUTO: 0.77 THOUSAND/ÂΜL (ref 0.17–1.22)
MONOCYTES NFR BLD AUTO: 12 % (ref 4–12)
NEUTROPHILS # BLD AUTO: 3.15 THOUSANDS/ÂΜL (ref 1.85–7.62)
NEUTS SEG NFR BLD AUTO: 50 % (ref 43–75)
NRBC BLD AUTO-RTO: 0 /100 WBCS
PLATELET # BLD AUTO: 362 THOUSANDS/UL (ref 149–390)
PMV BLD AUTO: 9 FL (ref 8.9–12.7)
POTASSIUM SERPL-SCNC: 3.9 MMOL/L (ref 3.5–5.3)
PROT SERPL-MCNC: 7.1 G/DL (ref 6.4–8.4)
RBC # BLD AUTO: 3.52 MILLION/UL (ref 3.81–5.12)
SODIUM SERPL-SCNC: 138 MMOL/L (ref 135–147)
TSH SERPL DL<=0.05 MIU/L-ACNC: 0.71 UIU/ML (ref 0.45–4.5)
WBC # BLD AUTO: 6.19 THOUSAND/UL (ref 4.31–10.16)

## 2023-05-09 NOTE — PROGRESS NOTES
Pt presents for port labs, offers no complaints, port accessed, labs drawn, port flushed and de-accessed, AVS declined, reviewed next appt, d/c from unit stable

## 2023-05-11 ENCOUNTER — HOSPITAL ENCOUNTER (OUTPATIENT)
Dept: INFUSION CENTER | Facility: CLINIC | Age: 38
Discharge: HOME | End: 2023-05-11

## 2023-05-11 VITALS
WEIGHT: 178 LBS | RESPIRATION RATE: 18 BRPM | BODY MASS INDEX: 30.39 KG/M2 | HEIGHT: 64 IN | HEART RATE: 109 BPM | SYSTOLIC BLOOD PRESSURE: 133 MMHG | DIASTOLIC BLOOD PRESSURE: 79 MMHG | TEMPERATURE: 97.9 F | OXYGEN SATURATION: 97 %

## 2023-05-11 DIAGNOSIS — C50.911 CARCINOMA OF RIGHT BREAST METASTATIC TO AXILLARY LYMPH NODE (HCC): ICD-10-CM

## 2023-05-11 DIAGNOSIS — C77.3 CARCINOMA OF RIGHT BREAST METASTATIC TO AXILLARY LYMPH NODE (HCC): ICD-10-CM

## 2023-05-11 DIAGNOSIS — D70.1 CHEMOTHERAPY INDUCED NEUTROPENIA (HCC): Primary | ICD-10-CM

## 2023-05-11 DIAGNOSIS — T45.1X5A CHEMOTHERAPY INDUCED NEUTROPENIA (HCC): Primary | ICD-10-CM

## 2023-05-11 RX ORDER — DOXORUBICIN HYDROCHLORIDE 2 MG/ML
60 INJECTION, SOLUTION INTRAVENOUS ONCE
Status: COMPLETED | OUTPATIENT
Start: 2023-05-11 | End: 2023-05-11

## 2023-05-11 RX ORDER — SODIUM CHLORIDE 9 MG/ML
20 INJECTION, SOLUTION INTRAVENOUS ONCE
Status: COMPLETED | OUTPATIENT
Start: 2023-05-11 | End: 2023-05-11

## 2023-05-11 RX ADMIN — DOXORUBICIN HYDROCHLORIDE 113 MG: 2 INJECTION, SOLUTION INTRAVENOUS at 14:37

## 2023-05-11 RX ADMIN — SODIUM CHLORIDE 20 ML/HR: 0.9 INJECTION, SOLUTION INTRAVENOUS at 10:35

## 2023-05-11 RX ADMIN — APREPITANT 130 MG: 130 INJECTION, EMULSION INTRAVENOUS at 11:16

## 2023-05-11 RX ADMIN — ALTEPLASE 2 MG: 2.2 INJECTION, POWDER, LYOPHILIZED, FOR SOLUTION INTRAVENOUS at 13:50

## 2023-05-11 RX ADMIN — CYCLOPHOSPHAMIDE 1134 MG: 1 INJECTION, POWDER, FOR SOLUTION INTRAVENOUS; ORAL at 12:54

## 2023-05-11 RX ADMIN — SODIUM CHLORIDE 200 MG: 9 INJECTION, SOLUTION INTRAVENOUS at 12:01

## 2023-05-11 RX ADMIN — DEXAMETHASONE SODIUM PHOSPHATE: 10 INJECTION, SOLUTION INTRAMUSCULAR; INTRAVENOUS at 10:42

## 2023-05-11 NOTE — PROGRESS NOTES
Pt reports to unit feeling fatigued at baseline  Patient receiving Keytruda AC , tolerated infusions well without any adverse events, TPA instilled prior to Adriamycin as patient had no blood return, after 30 minutes, positive blood return, chemotherapy resumed  Patient aware of next appointment, Port de accessed removed, AVS declined

## 2023-05-12 ENCOUNTER — HOSPITAL ENCOUNTER (OUTPATIENT)
Dept: INFUSION CENTER | Facility: CLINIC | Age: 38
Discharge: HOME | End: 2023-05-12

## 2023-05-12 ENCOUNTER — TELEPHONE (OUTPATIENT)
Dept: HEMATOLOGY ONCOLOGY | Facility: CLINIC | Age: 38
End: 2023-05-12

## 2023-05-12 DIAGNOSIS — C50.911 CARCINOMA OF RIGHT BREAST METASTATIC TO AXILLARY LYMPH NODE (HCC): ICD-10-CM

## 2023-05-12 DIAGNOSIS — T45.1X5A CHEMOTHERAPY INDUCED NEUTROPENIA (HCC): Primary | ICD-10-CM

## 2023-05-12 DIAGNOSIS — C77.3 CARCINOMA OF RIGHT BREAST METASTATIC TO AXILLARY LYMPH NODE (HCC): ICD-10-CM

## 2023-05-12 DIAGNOSIS — D70.1 CHEMOTHERAPY INDUCED NEUTROPENIA (HCC): Primary | ICD-10-CM

## 2023-05-12 RX ADMIN — PEGFILGRASTIM-BMEZ 6 MG: 6 INJECTION SUBCUTANEOUS at 15:15

## 2023-05-12 NOTE — TELEPHONE ENCOUNTER
Made an attempt to reschedule patients upcoming visit with Dr Brigid Lane as he will be on vacation    Appt 7/26/23  I left a voicemail detailing this and instructing patient to call back [facility] [phone] number to arrange a new follow up appointment

## 2023-05-15 ENCOUNTER — PATIENT OUTREACH (OUTPATIENT)
Dept: HEMATOLOGY ONCOLOGY | Facility: CLINIC | Age: 38
End: 2023-05-15

## 2023-05-15 DIAGNOSIS — C77.3 CARCINOMA OF RIGHT BREAST METASTATIC TO AXILLARY LYMPH NODE (HCC): Primary | ICD-10-CM

## 2023-05-15 DIAGNOSIS — C50.911 CARCINOMA OF RIGHT BREAST METASTATIC TO AXILLARY LYMPH NODE (HCC): Primary | ICD-10-CM

## 2023-05-15 DIAGNOSIS — R23.2 HOT FLASHES: ICD-10-CM

## 2023-05-15 RX ORDER — VENLAFAXINE HYDROCHLORIDE 75 MG/1
75 CAPSULE, EXTENDED RELEASE ORAL DAILY
Qty: 30 CAPSULE | Refills: 0 | Status: SHIPPED | OUTPATIENT
Start: 2023-05-15

## 2023-05-15 NOTE — TELEPHONE ENCOUNTER
If this is true, patient could be started on Effexor 75mg once daily for hot flashes if Dr Tuan Calvillo agrees  Call out to patient  She had stated that the hot flashes occurred once her period has subsided  Patient was wondering if that is related  Patient is okay with taking new prescription if recommended

## 2023-05-15 NOTE — PROGRESS NOTES
I spoke with patient to see how she is doing with her chemo treatment   Patient states overall side effects are manageable  Patient report's severe hot flashes throughout the day but are more often at night causing patient to have difficulty sleeping  I informed her I would notify her Dr Saintclair Bolk team  Patient stated she did mention this at last visit but would not mind them being informed again  She has no other questions or concerns at this time  I encouraged her to call should any arise

## 2023-05-16 ENCOUNTER — HOSPITAL ENCOUNTER (OUTPATIENT)
Dept: NON INVASIVE DIAGNOSTICS | Facility: HOSPITAL | Age: 38
Discharge: HOME | End: 2023-05-16

## 2023-05-16 VITALS
BODY MASS INDEX: 30.39 KG/M2 | WEIGHT: 178 LBS | HEART RATE: 91 BPM | DIASTOLIC BLOOD PRESSURE: 79 MMHG | SYSTOLIC BLOOD PRESSURE: 133 MMHG | HEIGHT: 64 IN

## 2023-05-16 DIAGNOSIS — C50.919 TRIPLE NEGATIVE BREAST CANCER (HCC): ICD-10-CM

## 2023-05-16 DIAGNOSIS — C50.911 CARCINOMA OF RIGHT BREAST METASTATIC TO AXILLARY LYMPH NODE (HCC): ICD-10-CM

## 2023-05-16 DIAGNOSIS — C77.3 CARCINOMA OF RIGHT BREAST METASTATIC TO AXILLARY LYMPH NODE (HCC): ICD-10-CM

## 2023-05-16 LAB
AORTIC ROOT: 2.9 CM
APICAL FOUR CHAMBER EJECTION FRACTION: 49 %
ASCENDING AORTA: 2.7 CM
E WAVE DECELERATION TIME: 133 MS
FRACTIONAL SHORTENING: 31 % (ref 28–44)
INTERVENTRICULAR SEPTUM IN DIASTOLE (PARASTERNAL SHORT AXIS VIEW): 1 CM
INTERVENTRICULAR SEPTUM: 1 CM (ref 0.6–1.1)
LAAS-AP2: 12.5 CM2
LAAS-AP4: 13.8 CM2
LEFT ATRIUM AREA SYSTOLE SINGLE PLANE A4C: 12.6 CM2
LEFT ATRIUM SIZE: 3.5 CM
LEFT INTERNAL DIMENSION IN SYSTOLE: 3.3 CM (ref 2.1–4)
LEFT VENTRICULAR INTERNAL DIMENSION IN DIASTOLE: 4.8 CM (ref 3.5–6)
LEFT VENTRICULAR POSTERIOR WALL IN END DIASTOLE: 1 CM
LEFT VENTRICULAR STROKE VOLUME: 65 ML
LVSV (TEICH): 65 ML
MV E'TISSUE VEL-SEP: 8 CM/S
MV PEAK A VEL: 0.82 M/S
MV PEAK E VEL: 61 CM/S
MV STENOSIS PRESSURE HALF TIME: 38 MS
MV VALVE AREA P 1/2 METHOD: 5.79 CM2
RIGHT ATRIUM AREA SYSTOLE A4C: 10.3 CM2
RIGHT VENTRICLE ID DIMENSION: 2.9 CM
SL CV LEFT ATRIUM LENGTH A2C: 4.6 CM
SL CV PED ECHO LEFT VENTRICLE DIASTOLIC VOLUME (MOD BIPLANE) 2D: 109 ML
SL CV PED ECHO LEFT VENTRICLE SYSTOLIC VOLUME (MOD BIPLANE) 2D: 44 ML
TR MAX PG: 21 MMHG
TR PEAK VELOCITY: 2.3 M/S
TRICUSPID ANNULAR PLANE SYSTOLIC EXCURSION: 1.8 CM
TRICUSPID VALVE PEAK REGURGITATION VELOCITY: 2.31 M/S

## 2023-05-17 DIAGNOSIS — T50.905A: Primary | ICD-10-CM

## 2023-05-17 DIAGNOSIS — I51.5: Primary | ICD-10-CM

## 2023-05-17 RX ORDER — SODIUM CHLORIDE 9 MG/ML
20 INJECTION, SOLUTION INTRAVENOUS ONCE
OUTPATIENT
Start: 2023-06-01

## 2023-05-17 NOTE — PROGRESS NOTES
Pt called notified of Adriamycin being dropped for C3 due to reduced LVEF and cardiology referral     Marcus Jason

## 2024-11-05 NOTE — TELEPHONE ENCOUNTER
Patient stated that for a little over a week patient has been loosing appetite, patient very nauseous with no appetite  Very nauseous with no emesis  Able to drink boost and a little of electrolyte beverage  Patient stated she has water in the morning, electrolyte beverage throughout the day  Patient has no appetite and nothing smells good or tastes good  Patient stated she is having heart burn that has progressed within the 1-2 weeks  Patient stated she is taking pepcid AC over the counter, usually in the afternoon or HS  Patient stated she is not able to eat any solid foods at this time  Patient stated on Friday she had some toast with butter  Patient stated that the nausea is constant and she stated that zofran does not help with the nausea  Suspicious that this is due to post-COVID reactive airways and a small amount of deconditioning, as patient has previously been very active  Cardiac workup is negative